# Patient Record
Sex: MALE | Race: WHITE | NOT HISPANIC OR LATINO | Employment: OTHER | URBAN - METROPOLITAN AREA
[De-identification: names, ages, dates, MRNs, and addresses within clinical notes are randomized per-mention and may not be internally consistent; named-entity substitution may affect disease eponyms.]

---

## 2017-01-25 ENCOUNTER — COMMUNICATION - HEALTHEAST (OUTPATIENT)
Dept: FAMILY MEDICINE | Facility: CLINIC | Age: 82
End: 2017-01-25

## 2017-01-25 DIAGNOSIS — E78.5 HYPERLIPIDEMIA: ICD-10-CM

## 2017-01-25 DIAGNOSIS — I25.10 CARDIOVASCULAR DISEASE: ICD-10-CM

## 2017-01-25 DIAGNOSIS — I10 ESSENTIAL HYPERTENSION: ICD-10-CM

## 2017-02-25 ENCOUNTER — RECORDS - HEALTHEAST (OUTPATIENT)
Dept: ADMINISTRATIVE | Facility: OTHER | Age: 82
End: 2017-02-25

## 2017-03-07 ENCOUNTER — OFFICE VISIT - HEALTHEAST (OUTPATIENT)
Dept: FAMILY MEDICINE | Facility: CLINIC | Age: 82
End: 2017-03-07

## 2017-03-07 DIAGNOSIS — N40.1 BENIGN PROSTATIC HYPERPLASIA WITH LOWER URINARY TRACT SYMPTOMS, UNSPECIFIED MORPHOLOGY: ICD-10-CM

## 2017-03-07 DIAGNOSIS — I10 ESSENTIAL HYPERTENSION: ICD-10-CM

## 2017-03-07 DIAGNOSIS — I25.10 CARDIOVASCULAR DISEASE: ICD-10-CM

## 2017-03-07 DIAGNOSIS — E78.5 HYPERLIPIDEMIA: ICD-10-CM

## 2017-03-07 DIAGNOSIS — M17.12 PRIMARY OSTEOARTHRITIS OF LEFT KNEE: ICD-10-CM

## 2017-03-07 LAB
CHOLEST SERPL-MCNC: 158 MG/DL
FASTING STATUS PATIENT QL REPORTED: YES
HDLC SERPL-MCNC: 32 MG/DL
LDLC SERPL CALC-MCNC: 71 MG/DL
TRIGL SERPL-MCNC: 277 MG/DL

## 2017-03-10 ENCOUNTER — COMMUNICATION - HEALTHEAST (OUTPATIENT)
Dept: FAMILY MEDICINE | Facility: CLINIC | Age: 82
End: 2017-03-10

## 2017-03-21 ENCOUNTER — COMMUNICATION - HEALTHEAST (OUTPATIENT)
Dept: SCHEDULING | Facility: CLINIC | Age: 82
End: 2017-03-21

## 2017-03-30 ENCOUNTER — OFFICE VISIT - HEALTHEAST (OUTPATIENT)
Dept: FAMILY MEDICINE | Facility: CLINIC | Age: 82
End: 2017-03-30

## 2017-03-30 DIAGNOSIS — K59.04 CHRONIC IDIOPATHIC CONSTIPATION: ICD-10-CM

## 2017-04-02 ENCOUNTER — COMMUNICATION - HEALTHEAST (OUTPATIENT)
Dept: FAMILY MEDICINE | Facility: CLINIC | Age: 82
End: 2017-04-02

## 2017-05-05 ENCOUNTER — OFFICE VISIT - HEALTHEAST (OUTPATIENT)
Dept: FAMILY MEDICINE | Facility: CLINIC | Age: 82
End: 2017-05-05

## 2017-05-05 DIAGNOSIS — M17.12 PRIMARY OSTEOARTHRITIS OF LEFT KNEE: ICD-10-CM

## 2017-05-05 DIAGNOSIS — R35.0 FREQUENCY OF URINATION: ICD-10-CM

## 2017-05-05 DIAGNOSIS — Z87.442 HISTORY OF NEPHROLITHIASIS: ICD-10-CM

## 2017-05-05 DIAGNOSIS — I10 ESSENTIAL HYPERTENSION: ICD-10-CM

## 2017-05-05 DIAGNOSIS — I25.10 CARDIOVASCULAR DISEASE: ICD-10-CM

## 2017-05-05 DIAGNOSIS — E78.5 HYPERLIPIDEMIA: ICD-10-CM

## 2017-05-05 LAB — PSA SERPL-MCNC: 1.7 NG/ML (ref 0–6.5)

## 2017-05-05 ASSESSMENT — MIFFLIN-ST. JEOR: SCORE: 1459.14

## 2017-06-06 ENCOUNTER — RECORDS - HEALTHEAST (OUTPATIENT)
Dept: ADMINISTRATIVE | Facility: OTHER | Age: 82
End: 2017-06-06

## 2017-10-06 ENCOUNTER — COMMUNICATION - HEALTHEAST (OUTPATIENT)
Dept: FAMILY MEDICINE | Facility: CLINIC | Age: 82
End: 2017-10-06

## 2017-10-06 DIAGNOSIS — M17.12 PRIMARY OSTEOARTHRITIS OF LEFT KNEE: ICD-10-CM

## 2018-04-08 ENCOUNTER — RECORDS - HEALTHEAST (OUTPATIENT)
Dept: ADMINISTRATIVE | Facility: OTHER | Age: 83
End: 2018-04-08

## 2018-05-03 ENCOUNTER — COMMUNICATION - HEALTHEAST (OUTPATIENT)
Dept: FAMILY MEDICINE | Facility: CLINIC | Age: 83
End: 2018-05-03

## 2018-05-03 DIAGNOSIS — I25.10 CARDIOVASCULAR DISEASE: ICD-10-CM

## 2018-05-03 DIAGNOSIS — I10 ESSENTIAL HYPERTENSION: ICD-10-CM

## 2018-08-03 ENCOUNTER — OFFICE VISIT - HEALTHEAST (OUTPATIENT)
Dept: FAMILY MEDICINE | Facility: CLINIC | Age: 83
End: 2018-08-03

## 2018-08-03 DIAGNOSIS — Z87.442 HISTORY OF NEPHROLITHIASIS: ICD-10-CM

## 2018-08-03 DIAGNOSIS — N40.0 BPH (BENIGN PROSTATIC HYPERPLASIA): ICD-10-CM

## 2018-08-03 DIAGNOSIS — Z00.00 ROUTINE GENERAL MEDICAL EXAMINATION AT A HEALTH CARE FACILITY: ICD-10-CM

## 2018-08-03 DIAGNOSIS — I10 ESSENTIAL HYPERTENSION: ICD-10-CM

## 2018-08-03 DIAGNOSIS — Z00.00 ROUTINE PHYSICAL EXAMINATION: ICD-10-CM

## 2018-08-03 DIAGNOSIS — I25.10 CARDIOVASCULAR DISEASE: ICD-10-CM

## 2018-08-03 LAB
ALBUMIN SERPL-MCNC: 3.8 G/DL (ref 3.5–5)
ALP SERPL-CCNC: 75 U/L (ref 45–120)
ALT SERPL W P-5'-P-CCNC: 19 U/L (ref 0–45)
ANION GAP SERPL CALCULATED.3IONS-SCNC: 9 MMOL/L (ref 5–18)
AST SERPL W P-5'-P-CCNC: 21 U/L (ref 0–40)
BILIRUB SERPL-MCNC: 0.4 MG/DL (ref 0–1)
BUN SERPL-MCNC: 22 MG/DL (ref 8–28)
CALCIUM SERPL-MCNC: 9.2 MG/DL (ref 8.5–10.5)
CHLORIDE BLD-SCNC: 105 MMOL/L (ref 98–107)
CHOLEST SERPL-MCNC: 146 MG/DL
CO2 SERPL-SCNC: 27 MMOL/L (ref 22–31)
CREAT SERPL-MCNC: 1.07 MG/DL (ref 0.7–1.3)
ERYTHROCYTE [DISTWIDTH] IN BLOOD BY AUTOMATED COUNT: 12.5 % (ref 11–14.5)
FASTING STATUS PATIENT QL REPORTED: YES
GFR SERPL CREATININE-BSD FRML MDRD: >60 ML/MIN/1.73M2
GLUCOSE BLD-MCNC: 111 MG/DL (ref 70–125)
HCT VFR BLD AUTO: 40.1 % (ref 40–54)
HDLC SERPL-MCNC: 37 MG/DL
HGB BLD-MCNC: 13.9 G/DL (ref 14–18)
LDLC SERPL CALC-MCNC: 86 MG/DL
MCH RBC QN AUTO: 33.3 PG (ref 27–34)
MCHC RBC AUTO-ENTMCNC: 34.6 G/DL (ref 32–36)
MCV RBC AUTO: 96 FL (ref 80–100)
PLATELET # BLD AUTO: 199 THOU/UL (ref 140–440)
PMV BLD AUTO: 7.6 FL (ref 7–10)
POTASSIUM BLD-SCNC: 4.8 MMOL/L (ref 3.5–5)
PROT SERPL-MCNC: 6.8 G/DL (ref 6–8)
RBC # BLD AUTO: 4.16 MILL/UL (ref 4.4–6.2)
SODIUM SERPL-SCNC: 141 MMOL/L (ref 136–145)
TRIGL SERPL-MCNC: 117 MG/DL
WBC: 8.6 THOU/UL (ref 4–11)

## 2018-08-03 ASSESSMENT — MIFFLIN-ST. JEOR: SCORE: 1419.22

## 2018-08-06 ENCOUNTER — COMMUNICATION - HEALTHEAST (OUTPATIENT)
Dept: FAMILY MEDICINE | Facility: CLINIC | Age: 83
End: 2018-08-06

## 2018-10-01 ENCOUNTER — OFFICE VISIT - HEALTHEAST (OUTPATIENT)
Dept: FAMILY MEDICINE | Facility: CLINIC | Age: 83
End: 2018-10-01

## 2018-10-01 DIAGNOSIS — K59.01 SLOW TRANSIT CONSTIPATION: ICD-10-CM

## 2019-02-11 ENCOUNTER — RECORDS - HEALTHEAST (OUTPATIENT)
Dept: ADMINISTRATIVE | Facility: OTHER | Age: 84
End: 2019-02-11

## 2019-08-15 ENCOUNTER — COMMUNICATION - HEALTHEAST (OUTPATIENT)
Dept: FAMILY MEDICINE | Facility: CLINIC | Age: 84
End: 2019-08-15

## 2019-08-15 DIAGNOSIS — I10 ESSENTIAL HYPERTENSION: ICD-10-CM

## 2019-08-15 DIAGNOSIS — I25.10 CARDIOVASCULAR DISEASE: ICD-10-CM

## 2019-08-15 DIAGNOSIS — E78.00 PURE HYPERCHOLESTEROLEMIA: ICD-10-CM

## 2019-10-02 ENCOUNTER — OFFICE VISIT - HEALTHEAST (OUTPATIENT)
Dept: FAMILY MEDICINE | Facility: CLINIC | Age: 84
End: 2019-10-02

## 2019-10-02 DIAGNOSIS — Z00.00 ROUTINE HISTORY AND PHYSICAL EXAMINATION OF ADULT: ICD-10-CM

## 2019-10-02 DIAGNOSIS — I10 ESSENTIAL HYPERTENSION: ICD-10-CM

## 2019-10-02 DIAGNOSIS — E78.00 PURE HYPERCHOLESTEROLEMIA: ICD-10-CM

## 2019-10-02 DIAGNOSIS — R35.0 BENIGN PROSTATIC HYPERPLASIA WITH URINARY FREQUENCY: ICD-10-CM

## 2019-10-02 DIAGNOSIS — I25.10 CARDIOVASCULAR DISEASE: ICD-10-CM

## 2019-10-02 DIAGNOSIS — N40.1 BENIGN PROSTATIC HYPERPLASIA WITH URINARY FREQUENCY: ICD-10-CM

## 2019-10-02 LAB
ALBUMIN SERPL-MCNC: 3.8 G/DL (ref 3.5–5)
ALP SERPL-CCNC: 68 U/L (ref 45–120)
ALT SERPL W P-5'-P-CCNC: 19 U/L (ref 0–45)
ANION GAP SERPL CALCULATED.3IONS-SCNC: 9 MMOL/L (ref 5–18)
AST SERPL W P-5'-P-CCNC: 23 U/L (ref 0–40)
BILIRUB SERPL-MCNC: 0.4 MG/DL (ref 0–1)
BUN SERPL-MCNC: 19 MG/DL (ref 8–28)
CALCIUM SERPL-MCNC: 9.4 MG/DL (ref 8.5–10.5)
CHLORIDE BLD-SCNC: 104 MMOL/L (ref 98–107)
CHOLEST SERPL-MCNC: 145 MG/DL
CO2 SERPL-SCNC: 27 MMOL/L (ref 22–31)
CREAT SERPL-MCNC: 1.02 MG/DL (ref 0.7–1.3)
ERYTHROCYTE [DISTWIDTH] IN BLOOD BY AUTOMATED COUNT: 12 % (ref 11–14.5)
FASTING STATUS PATIENT QL REPORTED: YES
GFR SERPL CREATININE-BSD FRML MDRD: >60 ML/MIN/1.73M2
GLUCOSE BLD-MCNC: 92 MG/DL (ref 70–125)
HCT VFR BLD AUTO: 41.7 % (ref 40–54)
HDLC SERPL-MCNC: 40 MG/DL
HGB BLD-MCNC: 14.1 G/DL (ref 14–18)
LDLC SERPL CALC-MCNC: 82 MG/DL
MCH RBC QN AUTO: 33.6 PG (ref 27–34)
MCHC RBC AUTO-ENTMCNC: 33.9 G/DL (ref 32–36)
MCV RBC AUTO: 99 FL (ref 80–100)
PLATELET # BLD AUTO: 195 THOU/UL (ref 140–440)
PMV BLD AUTO: 8.1 FL (ref 7–10)
POTASSIUM BLD-SCNC: 4.5 MMOL/L (ref 3.5–5)
PROT SERPL-MCNC: 6.8 G/DL (ref 6–8)
RBC # BLD AUTO: 4.2 MILL/UL (ref 4.4–6.2)
SODIUM SERPL-SCNC: 140 MMOL/L (ref 136–145)
TRIGL SERPL-MCNC: 117 MG/DL
WBC: 9.1 THOU/UL (ref 4–11)

## 2019-10-02 ASSESSMENT — MIFFLIN-ST. JEOR: SCORE: 1401.54

## 2019-10-02 ASSESSMENT — ANXIETY QUESTIONNAIRES
1. FEELING NERVOUS, ANXIOUS, OR ON EDGE: NOT AT ALL
2. NOT BEING ABLE TO STOP OR CONTROL WORRYING: NOT AT ALL

## 2019-10-08 ENCOUNTER — COMMUNICATION - HEALTHEAST (OUTPATIENT)
Dept: FAMILY MEDICINE | Facility: CLINIC | Age: 84
End: 2019-10-08

## 2020-03-05 ENCOUNTER — RECORDS - HEALTHEAST (OUTPATIENT)
Dept: ADMINISTRATIVE | Facility: OTHER | Age: 85
End: 2020-03-05

## 2020-03-11 ENCOUNTER — RECORDS - HEALTHEAST (OUTPATIENT)
Dept: ADMINISTRATIVE | Facility: OTHER | Age: 85
End: 2020-03-11

## 2020-05-11 ENCOUNTER — RECORDS - HEALTHEAST (OUTPATIENT)
Dept: ADMINISTRATIVE | Facility: OTHER | Age: 85
End: 2020-05-11

## 2020-06-29 ENCOUNTER — COMMUNICATION - HEALTHEAST (OUTPATIENT)
Dept: SCHEDULING | Facility: CLINIC | Age: 85
End: 2020-06-29

## 2020-06-29 ENCOUNTER — OFFICE VISIT - HEALTHEAST (OUTPATIENT)
Dept: FAMILY MEDICINE | Facility: CLINIC | Age: 85
End: 2020-06-29

## 2020-06-29 DIAGNOSIS — B37.0 THRUSH: ICD-10-CM

## 2020-06-29 RX ORDER — METHYLPREDNISOLONE 4 MG
2 TABLET, DOSE PACK ORAL DAILY
Status: SHIPPED | COMMUNITY
Start: 2020-06-29 | End: 2022-01-18

## 2020-06-29 RX ORDER — OXYBUTYNIN CHLORIDE 15 MG/1
15 TABLET, EXTENDED RELEASE ORAL DAILY
Status: SHIPPED | COMMUNITY
Start: 2020-04-26 | End: 2024-01-30

## 2020-06-29 ASSESSMENT — MIFFLIN-ST. JEOR: SCORE: 1404.26

## 2020-11-02 ENCOUNTER — COMMUNICATION - HEALTHEAST (OUTPATIENT)
Dept: FAMILY MEDICINE | Facility: CLINIC | Age: 85
End: 2020-11-02

## 2020-11-02 DIAGNOSIS — I25.10 CARDIOVASCULAR DISEASE: ICD-10-CM

## 2020-11-02 DIAGNOSIS — I10 ESSENTIAL HYPERTENSION: ICD-10-CM

## 2020-11-02 DIAGNOSIS — E78.00 PURE HYPERCHOLESTEROLEMIA: ICD-10-CM

## 2020-11-03 RX ORDER — CLOPIDOGREL BISULFATE 75 MG/1
75 TABLET ORAL DAILY
Qty: 90 TABLET | Refills: 3 | Status: SHIPPED | OUTPATIENT
Start: 2020-11-03 | End: 2021-10-25

## 2020-12-16 ENCOUNTER — OFFICE VISIT - HEALTHEAST (OUTPATIENT)
Dept: FAMILY MEDICINE | Facility: CLINIC | Age: 85
End: 2020-12-16

## 2020-12-16 DIAGNOSIS — C44.92 SQUAMOUS CELL CARCINOMA OF SKIN, UNSPECIFIED: ICD-10-CM

## 2020-12-16 DIAGNOSIS — K59.04 CHRONIC IDIOPATHIC CONSTIPATION: ICD-10-CM

## 2020-12-16 DIAGNOSIS — E78.2 MIXED HYPERLIPIDEMIA: ICD-10-CM

## 2020-12-16 DIAGNOSIS — Z00.00 ROUTINE HISTORY AND PHYSICAL EXAMINATION OF ADULT: ICD-10-CM

## 2020-12-16 DIAGNOSIS — I25.10 CORONARY ARTERY DISEASE INVOLVING NATIVE HEART WITHOUT ANGINA PECTORIS, UNSPECIFIED VESSEL OR LESION TYPE: ICD-10-CM

## 2020-12-16 DIAGNOSIS — R09.89 RHONCHI: ICD-10-CM

## 2020-12-16 DIAGNOSIS — M17.12 PRIMARY OSTEOARTHRITIS OF LEFT KNEE: ICD-10-CM

## 2020-12-16 DIAGNOSIS — R06.9 UNSPECIFIED ABNORMALITIES OF BREATHING: ICD-10-CM

## 2020-12-16 LAB
ALBUMIN SERPL-MCNC: 3.8 G/DL (ref 3.5–5)
ALP SERPL-CCNC: 68 U/L (ref 45–120)
ALT SERPL W P-5'-P-CCNC: 16 U/L (ref 0–45)
ANION GAP SERPL CALCULATED.3IONS-SCNC: 9 MMOL/L (ref 5–18)
AST SERPL W P-5'-P-CCNC: 21 U/L (ref 0–40)
BILIRUB SERPL-MCNC: 0.5 MG/DL (ref 0–1)
BNP SERPL-MCNC: 218 PG/ML (ref 0–93)
BUN SERPL-MCNC: 21 MG/DL (ref 8–28)
CALCIUM SERPL-MCNC: 8.9 MG/DL (ref 8.5–10.5)
CHLORIDE BLD-SCNC: 105 MMOL/L (ref 98–107)
CHOLEST SERPL-MCNC: 127 MG/DL
CO2 SERPL-SCNC: 27 MMOL/L (ref 22–31)
CREAT SERPL-MCNC: 0.88 MG/DL (ref 0.7–1.3)
ERYTHROCYTE [DISTWIDTH] IN BLOOD BY AUTOMATED COUNT: 11.8 % (ref 11–14.5)
FASTING STATUS PATIENT QL REPORTED: YES
GFR SERPL CREATININE-BSD FRML MDRD: >60 ML/MIN/1.73M2
GLUCOSE BLD-MCNC: 104 MG/DL (ref 70–125)
HCT VFR BLD AUTO: 38.3 % (ref 40–54)
HDLC SERPL-MCNC: 39 MG/DL
HGB BLD-MCNC: 13.2 G/DL (ref 14–18)
LDLC SERPL CALC-MCNC: 67 MG/DL
MCH RBC QN AUTO: 34.4 PG (ref 27–34)
MCHC RBC AUTO-ENTMCNC: 34.5 G/DL (ref 32–36)
MCV RBC AUTO: 100 FL (ref 80–100)
PLATELET # BLD AUTO: 176 THOU/UL (ref 140–440)
PMV BLD AUTO: 7.9 FL (ref 7–10)
POTASSIUM BLD-SCNC: 4.7 MMOL/L (ref 3.5–5)
PROT SERPL-MCNC: 6.5 G/DL (ref 6–8)
RBC # BLD AUTO: 3.84 MILL/UL (ref 4.4–6.2)
SODIUM SERPL-SCNC: 141 MMOL/L (ref 136–145)
TRIGL SERPL-MCNC: 103 MG/DL
WBC: 8.6 THOU/UL (ref 4–11)

## 2020-12-16 RX ORDER — TAMSULOSIN HYDROCHLORIDE 0.4 MG/1
0.8 CAPSULE ORAL
Qty: 180 CAPSULE | Refills: 3 | Status: SHIPPED | OUTPATIENT
Start: 2020-12-16 | End: 2021-12-05

## 2020-12-16 ASSESSMENT — MIFFLIN-ST. JEOR: SCORE: 1376.36

## 2020-12-16 ASSESSMENT — ANXIETY QUESTIONNAIRES
2. NOT BEING ABLE TO STOP OR CONTROL WORRYING: NOT AT ALL
1. FEELING NERVOUS, ANXIOUS, OR ON EDGE: NOT AT ALL

## 2020-12-17 ENCOUNTER — COMMUNICATION - HEALTHEAST (OUTPATIENT)
Dept: FAMILY MEDICINE | Facility: CLINIC | Age: 85
End: 2020-12-17

## 2021-01-07 ENCOUNTER — COMMUNICATION - HEALTHEAST (OUTPATIENT)
Dept: FAMILY MEDICINE | Facility: CLINIC | Age: 86
End: 2021-01-07

## 2021-02-04 ENCOUNTER — AMBULATORY - HEALTHEAST (OUTPATIENT)
Dept: NURSING | Facility: CLINIC | Age: 86
End: 2021-02-04

## 2021-02-25 ENCOUNTER — AMBULATORY - HEALTHEAST (OUTPATIENT)
Dept: NURSING | Facility: CLINIC | Age: 86
End: 2021-02-25

## 2021-04-13 ENCOUNTER — RECORDS - HEALTHEAST (OUTPATIENT)
Dept: ADMINISTRATIVE | Facility: OTHER | Age: 86
End: 2021-04-13

## 2021-04-26 ENCOUNTER — COMMUNICATION - HEALTHEAST (OUTPATIENT)
Dept: FAMILY MEDICINE | Facility: CLINIC | Age: 86
End: 2021-04-26

## 2021-04-26 DIAGNOSIS — E78.00 PURE HYPERCHOLESTEROLEMIA: ICD-10-CM

## 2021-04-26 DIAGNOSIS — I10 ESSENTIAL HYPERTENSION: ICD-10-CM

## 2021-04-26 RX ORDER — ATENOLOL 25 MG/1
25 TABLET ORAL DAILY
Qty: 90 TABLET | Refills: 2 | Status: SHIPPED | OUTPATIENT
Start: 2021-04-26 | End: 2022-01-23

## 2021-04-26 RX ORDER — SIMVASTATIN 20 MG
TABLET ORAL
Qty: 90 TABLET | Refills: 2 | Status: SHIPPED | OUTPATIENT
Start: 2021-04-26 | End: 2022-01-23

## 2021-05-24 ENCOUNTER — RECORDS - HEALTHEAST (OUTPATIENT)
Dept: ADMINISTRATIVE | Facility: CLINIC | Age: 86
End: 2021-05-24

## 2021-05-25 ENCOUNTER — RECORDS - HEALTHEAST (OUTPATIENT)
Dept: ADMINISTRATIVE | Facility: OTHER | Age: 86
End: 2021-05-25

## 2021-05-29 ENCOUNTER — RECORDS - HEALTHEAST (OUTPATIENT)
Dept: ADMINISTRATIVE | Facility: CLINIC | Age: 86
End: 2021-05-29

## 2021-05-30 ENCOUNTER — RECORDS - HEALTHEAST (OUTPATIENT)
Dept: ADMINISTRATIVE | Facility: CLINIC | Age: 86
End: 2021-05-30

## 2021-05-30 VITALS — BODY MASS INDEX: 30.95 KG/M2 | WEIGHT: 186 LBS

## 2021-05-30 VITALS — BODY MASS INDEX: 30.34 KG/M2 | HEIGHT: 66 IN | WEIGHT: 188.8 LBS

## 2021-05-30 VITALS — WEIGHT: 187.38 LBS | BODY MASS INDEX: 31.18 KG/M2

## 2021-05-31 ENCOUNTER — RECORDS - HEALTHEAST (OUTPATIENT)
Dept: ADMINISTRATIVE | Facility: CLINIC | Age: 86
End: 2021-05-31

## 2021-05-31 NOTE — TELEPHONE ENCOUNTER
Refill Approved    Rx renewed per Medication Renewal Policy. Medication was last renewed on 8/3/18.    Roselia Stanley, [facility] Triage/Med Refill 8/15/2019     Requested Prescriptions   Pending Prescriptions Disp Refills     simvastatin (ZOCOR) 20 MG tablet [Pharmacy Med Name: SIMVASTATIN 20MG TABLETS] 90 tablet 0     Sig: TAKE 1 TABLET BY MOUTH AT BEDTIME       Statins Refill Protocol (Hmg CoA Reductase Inhibitors) Passed - 8/15/2019  5:05 AM        Passed - PCP or prescribing provider visit in past 12 months      Last office visit with prescriber/PCP: 3/7/2017 Miguel Day MD OR same dept: 10/1/2018 Brooke Manning DO OR same specialty: 10/1/2018 Brooke Manning DO  Last physical: 8/3/2018 Last MTM visit: Visit date not found   Next visit within 3 mo: Visit date not found  Next physical within 3 mo: Visit date not found  Prescriber OR PCP: Miguel Day MD  Last diagnosis associated with med order: 1. Cardiovascular disease  - clopidogrel (PLAVIX) 75 mg tablet [Pharmacy Med Name: CLOPIDOGREL 75MG TABLETS]; TAKE 1 TABLET BY MOUTH DAILY  Dispense: 90 tablet; Refill: 0    If protocol passes may refill for 12 months if within 3 months of last provider visit (or a total of 15 months).             atenolol (TENORMIN) 25 MG tablet [Pharmacy Med Name: ATENOLOL 25MG TABLETS] 90 tablet 0     Sig: TAKE 1 TABLET BY MOUTH DAILY       Beta-Blockers Refill Protocol Passed - 8/15/2019  5:05 AM        Passed - PCP or prescribing provider visit in past 12 months or next 3 months     Last office visit with prescriber/PCP: 3/7/2017 Miguel Day MD OR same dept: 10/1/2018 Brooke Manning DO OR same specialty: 10/1/2018 Brooke Manning DO  Last physical: 8/3/2018 Last MTM visit: Visit date not found   Next visit within 3 mo: Visit date not found  Next physical within 3 mo: Visit date not found  Prescriber OR PCP: Miguel Day MD  Last diagnosis associated with med order: 1.  Cardiovascular disease  - clopidogrel (PLAVIX) 75 mg tablet [Pharmacy Med Name: CLOPIDOGREL 75MG TABLETS]; TAKE 1 TABLET BY MOUTH DAILY  Dispense: 90 tablet; Refill: 0    If protocol passes may refill for 12 months if within 3 months of last provider visit (or a total of 15 months).             Passed - Blood pressure filed in past 12 months     BP Readings from Last 1 Encounters:   10/01/18 119/55             clopidogrel (PLAVIX) 75 mg tablet [Pharmacy Med Name: CLOPIDOGREL 75MG TABLETS] 90 tablet 0     Sig: TAKE 1 TABLET BY MOUTH DAILY       Clopidogrel/Prasugrel/Ticagrelor Refill Protocol Failed - 8/15/2019  5:05 AM        Failed - PCP or prescribing provider visit in past 6 months       Last office visit with prescriber/PCP: Visit date not found OR same dept: 10/1/2018 Brooke Manning DO OR same specialty: 10/1/2018 Brooke Manning DO Last physical: Visit date not found Last MTM visit: Visit date not found     Next appt within 3 mo: Visit date not found  Next physical within 3 mo: Visit date not found  Prescriber OR PCP: Miguel Day MD  Last diagnosis associated with med order: 1. Cardiovascular disease  - clopidogrel (PLAVIX) 75 mg tablet [Pharmacy Med Name: CLOPIDOGREL 75MG TABLETS]; TAKE 1 TABLET BY MOUTH DAILY  Dispense: 90 tablet; Refill: 0    If protocol passes may refill for 6 months if within 3 months of last provider visit (or a total of 9 months).              Failed - Hemoglobin in past 12 months     Hemoglobin   Date Value Ref Range Status   08/03/2018 13.9 (L) 14.0 - 18.0 g/dL Final

## 2021-05-31 NOTE — TELEPHONE ENCOUNTER
RN cannot approve Refill Request    RN can NOT refill this medication Protocol failed and NO refill given.     Roselia Stanley, [facility] Triage/Med Refill 8/15/2019    Requested Prescriptions   Pending Prescriptions Disp Refills     clopidogrel (PLAVIX) 75 mg tablet [Pharmacy Med Name: CLOPIDOGREL 75MG TABLETS] 90 tablet 3     Sig: TAKE 1 TABLET BY MOUTH DAILY       Clopidogrel/Prasugrel/Ticagrelor Refill Protocol Failed - 8/15/2019  5:05 AM        Failed - PCP or prescribing provider visit in past 6 months       Last office visit with prescriber/PCP: Visit date not found OR same dept: 10/1/2018 Brooke Manning DO OR same specialty: 10/1/2018 Brooke Manning DO Last physical: Visit date not found Last MTM visit: Visit date not found     Next appt within 3 mo: Visit date not found  Next physical within 3 mo: Visit date not found  Prescriber OR PCP: Miguel Day MD  Last diagnosis associated with med order: 1. Cardiovascular disease  - clopidogrel (PLAVIX) 75 mg tablet [Pharmacy Med Name: CLOPIDOGREL 75MG TABLETS]; TAKE 1 TABLET BY MOUTH DAILY  Dispense: 90 tablet; Refill: 0    2. Essential Hypercholesterolemia  - simvastatin (ZOCOR) 20 MG tablet; TAKE 1 TABLET BY MOUTH AT BEDTIME  Dispense: 90 tablet; Refill: 0    3. Essential hypertension  - atenolol (TENORMIN) 25 MG tablet; TAKE 1 TABLET BY MOUTH DAILY  Dispense: 90 tablet; Refill: 0    If protocol passes may refill for 6 months if within 3 months of last provider visit (or a total of 9 months).              Failed - Hemoglobin in past 12 months     Hemoglobin   Date Value Ref Range Status   08/03/2018 13.9 (L) 14.0 - 18.0 g/dL Final           Signed Prescriptions Disp Refills    simvastatin (ZOCOR) 20 MG tablet 90 tablet 0     Sig: TAKE 1 TABLET BY MOUTH AT BEDTIME       Statins Refill Protocol (Hmg CoA Reductase Inhibitors) Passed - 8/15/2019  5:05 AM        Passed - PCP or prescribing provider visit in past 12 months      Last office  visit with prescriber/PCP: 3/7/2017 Miguel Day MD OR same dept: 10/1/2018 Brooke Manning DO OR same specialty: 10/1/2018 Brooke Manning DO  Last physical: 8/3/2018 Last MTM visit: Visit date not found   Next visit within 3 mo: Visit date not found  Next physical within 3 mo: Visit date not found  Prescriber OR PCP: Miguel Day MD  Last diagnosis associated with med order: 1. Cardiovascular disease  - clopidogrel (PLAVIX) 75 mg tablet [Pharmacy Med Name: CLOPIDOGREL 75MG TABLETS]; TAKE 1 TABLET BY MOUTH DAILY  Dispense: 90 tablet; Refill: 0    2. Essential Hypercholesterolemia  - simvastatin (ZOCOR) 20 MG tablet; TAKE 1 TABLET BY MOUTH AT BEDTIME  Dispense: 90 tablet; Refill: 0    3. Essential hypertension  - atenolol (TENORMIN) 25 MG tablet; TAKE 1 TABLET BY MOUTH DAILY  Dispense: 90 tablet; Refill: 0    If protocol passes may refill for 12 months if within 3 months of last provider visit (or a total of 15 months).            atenolol (TENORMIN) 25 MG tablet 90 tablet 0     Sig: TAKE 1 TABLET BY MOUTH DAILY       Beta-Blockers Refill Protocol Passed - 8/15/2019  5:05 AM        Passed - PCP or prescribing provider visit in past 12 months or next 3 months     Last office visit with prescriber/PCP: 3/7/2017 Miguel Day MD OR same dept: 10/1/2018 Brooke Manning DO OR same specialty: 10/1/2018 Brooke Manning DO  Last physical: 8/3/2018 Last MTM visit: Visit date not found   Next visit within 3 mo: Visit date not found  Next physical within 3 mo: Visit date not found  Prescriber OR PCP: Miguel Day MD  Last diagnosis associated with med order: 1. Cardiovascular disease  - clopidogrel (PLAVIX) 75 mg tablet [Pharmacy Med Name: CLOPIDOGREL 75MG TABLETS]; TAKE 1 TABLET BY MOUTH DAILY  Dispense: 90 tablet; Refill: 0    2. Essential Hypercholesterolemia  - simvastatin (ZOCOR) 20 MG tablet; TAKE 1 TABLET BY MOUTH AT BEDTIME  Dispense: 90 tablet; Refill: 0    3.  Essential hypertension  - atenolol (TENORMIN) 25 MG tablet; TAKE 1 TABLET BY MOUTH DAILY  Dispense: 90 tablet; Refill: 0    If protocol passes may refill for 12 months if within 3 months of last provider visit (or a total of 15 months).             Passed - Blood pressure filed in past 12 months     BP Readings from Last 1 Encounters:   10/01/18 119/55

## 2021-06-01 VITALS — BODY MASS INDEX: 28.67 KG/M2 | WEIGHT: 177.6 LBS

## 2021-06-01 VITALS — HEIGHT: 66 IN | WEIGHT: 180 LBS | BODY MASS INDEX: 28.93 KG/M2

## 2021-06-01 NOTE — PROGRESS NOTES
Assessment and Plan:       1. Routine history and physical examination of adult  Patient is here for annual exam  Patient has no acute concerns  Patient requesting refills of his medication sent to the pharmacy  - Comprehensive Metabolic Panel  - HM2(CBC w/o Differential)  - Lipid Cascade    2. Essential hypertension  Patient's blood pressure is at goal range continue with current medication  - atenolol (TENORMIN) 25 MG tablet; Take 1 tablet (25 mg total) by mouth daily.  Dispense: 90 tablet; Refill: 3    3. Cardiovascular disease  With patient's history of coronary artery disease he has been recommend that he continue on Plavix  - clopidogrel (PLAVIX) 75 mg tablet; Take 1 tablet (75 mg total) by mouth daily.  Dispense: 90 tablet; Refill: 3    4. Essential Hypercholesterolemia  Patient is on statin therapy for history of coronary artery disease goal LDL is 70  Check cholesterol levels today and follow-up based on results  - simvastatin (ZOCOR) 20 MG tablet; Take 1 tablet (20 mg total) by mouth at bedtime.  Dispense: 90 tablet; Refill: 3    5. Benign prostatic hyperplasia with urinary frequency  Patient follows with urology  Is on tamsulosin as well as oxybutynin    The patient's current medical problems were reviewed.    The following high BMI interventions were performed this visit: encouragement to exercise  The following health maintenance schedule was reviewed with the patient and provided in printed form in the after visit summary:   Health Maintenance   Topic Date Due     TD 18+ HE  05/23/1949     ZOSTER VACCINES (1 of 2) 05/23/1981     MEDICARE ANNUAL WELLNESS VISIT  08/03/2019     FALL RISK ASSESSMENT  08/03/2019     PNEUMOCOCCAL IMMUNIZATION 65+ LOW/MEDIUM RISK (2 of 2 - PPSV23) 08/03/2019     ADVANCE CARE PLANNING  08/03/2023     INFLUENZA VACCINE RULE BASED  Completed        Subjective:   Chief Complaint: Balwinder Huang is an 88 y.o. male here for an Annual Wellness visit.   HPI: Patient is here for annual  exam  Patient follows with urology for BPH  He follows with dermatology for his history of skin cancer  Patient's blood pressure is at goal range we will have him continue with atenolol  For his history of coronary artery disease patient continues on Plavix and a statin-we will check cholesterol levels today  He is up-to-date on immunizations  Patient is active both socially and mentally  Patient does a lot of reading  He enjoys stamp collecting    Review of Systems:   Please see above.  The rest of the review of systems are negative for all systems.    Patient Care Team:  Miguel Day MD as PCP - Brooke Leach DO as Assigned PCP     Patient Active Problem List   Diagnosis     Essential Hypercholesterolemia     Joint Stiffness Of The Hip     Essential hypertension     Coronary Artery Disease     Fainting (Syncope)     Squamous cell carcinoma of skin, unspecified     Primary osteoarthritis of left knee     Hyperlipidemia     Coronary artery disease involving native coronary artery without angina pectoris     Chronic idiopathic constipation     History of nephrolithiasis     Past Medical History:   Diagnosis Date     History of transfusion      Hyperlipemia      Hypertension       Past Surgical History:   Procedure Laterality Date     APPENDECTOMY       JOINT REPLACEMENT Right     hip     CO CABG, VEIN, SINGLE      Description: CABG (CABG);  Recorded: 08/04/2014;     CO INSERT INTRACORONARY STENT      Description: Cath Stent Placement;  Recorded: 08/04/2014;     TOTAL HIP ARTHROPLASTY Right      total knee arthroplasty Left 06/2016    [facility]      Family History   Problem Relation Age of Onset     Prostate cancer Brother       Social History     Socioeconomic History     Marital status:      Spouse name: Not on file     Number of children: Not on file     Years of education: Not on file     Highest education level: Not on file   Occupational History     Not on file   Social Needs      Financial resource strain: Not on file     Food insecurity:     Worry: Not on file     Inability: Not on file     Transportation needs:     Medical: Not on file     Non-medical: Not on file   Tobacco Use     Smoking status: Never Smoker     Smokeless tobacco: Never Used   Substance and Sexual Activity     Alcohol use: No     Drug use: No     Sexual activity: Not on file   Lifestyle     Physical activity:     Days per week: Not on file     Minutes per session: Not on file     Stress: Not on file   Relationships     Social connections:     Talks on phone: Not on file     Gets together: Not on file     Attends Anabaptism service: Not on file     Active member of club or organization: Not on file     Attends meetings of clubs or organizations: Not on file     Relationship status: Not on file     Intimate partner violence:     Fear of current or ex partner: Not on file     Emotionally abused: Not on file     Physically abused: Not on file     Forced sexual activity: Not on file   Other Topics Concern     Not on file   Social History Narrative     Not on file      Current Outpatient Medications   Medication Sig Dispense Refill     aspirin 325 MG tablet Take 1 tablet (325 mg total) by mouth daily. Must take for 30 days for dvt prophylaxis. Take with meals  0     atenolol (TENORMIN) 25 MG tablet Take 1 tablet (25 mg total) by mouth daily. 90 tablet 3     clopidogrel (PLAVIX) 75 mg tablet Take 1 tablet (75 mg total) by mouth daily. 90 tablet 3     multivitamin therapeutic (THERAGRAN) tablet Take 1 tablet by mouth daily.       oxybutynin (DITROPAN XL) 10 MG ER tablet Take 10 mg by mouth daily.       simvastatin (ZOCOR) 20 MG tablet Take 1 tablet (20 mg total) by mouth at bedtime. 90 tablet 3     tamsulosin (FLOMAX) 0.4 mg Cp24 Take 2 capsules (0.8 mg total) by mouth Daily after breakfast. 180 capsule 3     vit C/vit E/lutein/min/omega-3 (OCUVITE ORAL) Take 1 tablet by mouth daily.       No current facility-administered  "medications for this visit.       Objective:   Vital Signs:   Visit Vitals  /49 (Patient Site: Left Arm, Patient Position: Sitting, Cuff Size: Adult Regular)   Pulse 68   Temp 98.4  F (36.9  C) (Oral)   Resp 18   Ht 5' 5\" (1.651 m)   Wt 179 lb 9.6 oz (81.5 kg)   BMI 29.89 kg/m         VisionScreening:  No exam data present     PHYSICAL EXAM  Physical Examination: General appearance - alert, well appearing, and in no distress, walks with aid of cane or walker  Mental status - alert, oriented to person, place, and time  Eyes - pupils equal and reactive, extraocular eye movements intact  Ears - bilateral TM's and external ear canals normal  Chest - clear to auscultation, no wheezes, rales or rhonchi, symmetric air entry  Heart - normal rate, regular rhythm, normal S1, S2, no murmurs, rubs, clicks or gallops  Abdomen - soft, nontender, nondistended, no masses or organomegaly  Neurological - alert, oriented, normal speech, no focal findings or movement disorder noted  Musculoskeletal - no joint tenderness, deformity or swelling  Extremities - peripheral pulses normal, no pedal edema, no clubbing or cyanosis      Assessment Results 10/2/2019   Activities of Daily Living No help needed   Instrumental Activities of Daily Living No help needed   Get Up and Go Score -   Mini Cog Total Score 4   Some recent data might be hidden     A Mini-Cog score of 0-2 suggests the possibility of dementia, score of 3-5 suggests no dementia    Identified Health Risks:     The patient was provided with suggestions to help him develop a healthy physical lifestyle.   He is at risk for lack of exercise and has been provided with information to increase physical activity for the benefit of his well-being.  The patient was counseled and encouraged to consider modifying their diet and eating habits. He was provided with information on recommended healthy diet options.  The patient reports that he does not have all recommended working " emergency equipment available. He was provided with information about emergency preparedness, including smoke detectors.  He is at risk for falling and has been provided with information to reduce the risk of falling at home.  Patient's advanced directive was discussed and I am comfortable with the patient's wishes.         5

## 2021-06-03 VITALS
BODY MASS INDEX: 29.92 KG/M2 | TEMPERATURE: 98.4 F | HEART RATE: 68 BPM | SYSTOLIC BLOOD PRESSURE: 110 MMHG | HEIGHT: 65 IN | WEIGHT: 179.6 LBS | DIASTOLIC BLOOD PRESSURE: 49 MMHG | RESPIRATION RATE: 18 BRPM

## 2021-06-04 VITALS
HEART RATE: 74 BPM | RESPIRATION RATE: 16 BRPM | TEMPERATURE: 97.2 F | SYSTOLIC BLOOD PRESSURE: 112 MMHG | BODY MASS INDEX: 30.02 KG/M2 | HEIGHT: 65 IN | WEIGHT: 180.2 LBS | DIASTOLIC BLOOD PRESSURE: 44 MMHG

## 2021-06-05 VITALS
SYSTOLIC BLOOD PRESSURE: 135 MMHG | RESPIRATION RATE: 18 BRPM | BODY MASS INDEX: 29.29 KG/M2 | HEART RATE: 68 BPM | DIASTOLIC BLOOD PRESSURE: 65 MMHG | HEIGHT: 65 IN | TEMPERATURE: 97.2 F | WEIGHT: 175.8 LBS

## 2021-06-09 NOTE — TELEPHONE ENCOUNTER
Same Date/Next Day Appt Request  What is the reason for your visit?: Per patient he as a lot of sores in his mouth and wants to be seen for it ASAP    Provider Preference: ANY PCP    How soon do you need to be seen?: today    Waitlist offered?: No    Okay to leave a detailed message:  No

## 2021-06-09 NOTE — PROGRESS NOTES
Please call patient:    Thyroid testing is normal. I hope your constipation is better. Let me know what else I can do for you    Dr. Christianson.

## 2021-06-09 NOTE — PROGRESS NOTES
Assessment/Plan:     Problem List Items Addressed This Visit     None      Visit Diagnoses     Thrush    -  Primary    We will treat with nystatin as per orders.  Side effects precautions discussed.  If not improved in 5 days contact clinic and will move over to either oral trojes or 7 days of ketoconazole 200 mg daily.    Relevant Medications    nystatin (MYCOSTATIN) 100,000 unit/mL suspension        Return in about 3 months (around 10/2/2020) for Annual physical.    Subjective:   89 y.o. male presents for sore mouth for the past 2 weeks.  He has tried mouthwash without help.  Mainly in the right side of his mouth.  No no change in dentures.  No recent dental use.  No inhalers.  Has been having feeling of a dry mouth but that is pretty consistent with his use of oxybutynin.  No change in urination.  No fevers or chills.  No loose teeth or pain in the teeth.      Mouth Lesions    Associated symptoms include mouth sores. Pertinent negatives include no fever, no double vision, no eye itching, no abdominal pain, no diarrhea, no nausea, no congestion, no ear discharge, no ear pain, no headaches, no rhinorrhea, no sore throat, no swollen glands, no neck pain, no neck stiffness, no cough, no URI, no wheezing, no rash, no eye discharge, no eye pain and no eye redness. He has been crying more.       Review of Systems   Constitutional: Negative for fever.   HENT: Positive for mouth sores. Negative for congestion, ear discharge, ear pain, rhinorrhea and sore throat.    Eyes: Negative for double vision, pain, discharge, redness and itching.   Respiratory: Negative for cough and wheezing.    Gastrointestinal: Negative for abdominal pain, diarrhea and nausea.   Musculoskeletal: Negative for neck pain.   Skin: Negative for rash.   Neurological: Negative for headaches.        History     Reviewed By Date/Time Sections Reviewed    Kareem Diop DO 6/29/2020  1:57 PM Medical, Surgical, Tobacco, Family, Socioeconomic     "Gonsalo Paulino TYRA Rebollar, [facility] 6/29/2020  1:51 PM Tobacco           Objective:     Vitals:    06/29/20 1348   BP: 112/44   Pulse: 74   Resp: 16   Temp: 97.2  F (36.2  C)   TempSrc: Oral   Weight: 180 lb 3.2 oz (81.7 kg)   Height: 5' 5\" (1.651 m)     Physical Exam  Vitals signs reviewed.   Constitutional:       General: He is not in acute distress.     Appearance: Normal appearance.   HENT:      Head: Normocephalic and atraumatic.      Nose: Nose normal. No congestion.      Mouth/Throat:      Mouth: Mucous membranes are moist.      Comments: White adherence to the right side vehicle surface and peripheral mouth as well as right side of tongue.  No open ulcerations.  Musculoskeletal:      Right lower leg: No edema.      Left lower leg: No edema.   Lymphadenopathy:      Cervical: No cervical adenopathy.   Skin:     Capillary Refill: Capillary refill takes less than 2 seconds.      Findings: No rash.   Neurological:      Mental Status: He is alert and oriented to person, place, and time.   Psychiatric:         Mood and Affect: Mood normal.         Thought Content: Thought content normal.         This note has been dictated using voice recognition software. Any grammatical or context distortions are unintentional and inherent to the software      "

## 2021-06-09 NOTE — PROGRESS NOTES
SUBJECTIVE   Balwinder Huang is a 85 y.o. old male with a past medical history of HTN, HLD, CAD, and BPH who presented to clinic today for further evaluation of worsening constipation. He reports his constipation worsened since starting tamsulosin on 3/7/2017 for BPH. He has had constipation in the past. Tamsulosin has helped his urinary issues. This episode of constipation has been going on for 2-3 weeks. Has been using many different OTC treatments (including Dulcolax, exlax or Sennosides, suppository and fleet enema). Has done 3 enemas and 2-3 suppositories in the last week. Denies blood in stool. He has been straining and cannot have any bowel without enemas or suppositories. He denies any abdominal pain but does feel fullness. He hasn't taken Miralax. He does take Metamucil capsules (2 capsules a day). He denies any fever/chills. He denies any urinary symptoms. He tries to eat enough fruits/vegetables. Previous colonoscopies have been normal, did have polyp but nothing else.        Medical History  Active Ambulatory (Non-Hospital) Problems    Diagnosis     Primary osteoarthritis of left knee     Hyperlipidemia     Coronary artery disease involving native coronary artery without angina pectoris     Squamous cell carcinoma     Essential hypertension     Coronary Artery Disease     Fainting (Syncope)     Essential Hypercholesterolemia     Joint Stiffness Of The Hip     Past Medical History:   Diagnosis Date     History of transfusion      Hyperlipemia      Hypertension        Surgical History  He  has a past surgical history that includes cabg, vein, single; insert intracoronary stent; Joint replacement (Right); and total knee arthroplasty (Left, 06/2016).    Social History  Reviewed, and he  reports that he has never smoked. He has never used smokeless tobacco. He reports that he does not drink alcohol or use illicit drugs.    Family History  Reviewed, and family history is not on file.    Medications  Reviewed  and reconciled      Allergies  Allergies   Allergen Reactions     Shellfish Containing Products      Shrimp     Ampicillin Rash     On face         OBJECTIVE  Physical Exam:  Vital signs: /58 (Patient Site: Left Arm, Patient Position: Sitting, Cuff Size: Adult Large)  Pulse 80  Temp 98.1  F (36.7  C) (Oral)   Resp 22  Wt 187 lb 6 oz (85 kg)  BMI 31.18 kg/m2  Weight: 187 lb 6 oz (85 kg)    General appearance: pleasant, appears stated age, cooperative and in no distress  Lymph: no cervical/supraclavicular adenopathy  Respiratory: clear to auscultation bilaterally, good air movement throughout, no wheezing or crackles, speaking full sentences without difficulty  Cardiovascular: regular rate and rhythm, no murmur appreciated, no leg edema  Abdomen: active bowel sounds, soft, non-distended, mildly tender to palpation in the lower abdominal region  : rectal exam performed, stool in rectal vault, no blood visualized. No lump/mass appreciated.      ASSESSMENT/ PLAN    1. Chronic idiopathic constipation  85 year old male with h/o HTN, HLD, CAD, and BPH here today for worsening constipation. Patient noticed this after he was started on tamsulosin for BPH symptoms. He has a long standing h/o constipation. Constipation isn't a side effect of tamsulosin. Patient noticed tremendous benefit from tamsulosin. Continue with tamsulosin. Will treat him symptomatically. Mag citrate/glygerin suppository/fleet enema as needed. Advised patient to take miralax, pericolace, and metamucil fiber supplement daily. Will also check TSH to make sure constipation isn't caused by thyroid issue. No imaging warranted at this point. RTC in 1-2 weeks if not improving. Patient verbalized understanding and agreed with plan.   - magnesium citrate solution; Take 296 mL by mouth once for 1 dose.  Dispense: 300 mL; Refill: 2  - senna-docusate (PERICOLACE) 8.6-50 mg tablet; Take 2 tablets by mouth daily.  Dispense: 30 tablet; Refill: 1  - sodium  phosphates 133 mL (SODIUM PHOSPHATE) 19-7 gram/118 mL Enem rectal enema; Insert 1 enema into the rectum once for 1 dose.  Dispense: 1 mL; Refill: 3  - glycerin, adult, Supp rectal suppository; Insert 1 suppository into the rectum daily as needed.  Dispense: 45 each; Refill: 1  - Thyroid Stimulating Hormone (TSH)      Darling Christianson MD

## 2021-06-09 NOTE — PROGRESS NOTES
ASSESSMENT & PLAN:    1. Essential hypertension  Blood pressure at goal continue current medication at this time  - atenolol (TENORMIN) 50 MG tablet; Take 1 tablet (50 mg total) by mouth daily.  Dispense: 90 tablet; Refill: 3  - HM2(CBC w/o Differential)    2. Cardiovascular disease  Refill of Plavix given today that currently benefit outweighs risks of medication continue with Plavix at this time  - clopidogrel (PLAVIX) 75 mg tablet; Take 1 tablet (75 mg total) by mouth daily.  Dispense: 90 tablet; Refill: 3    3. Hyperlipidemia  Check cholesterol levels today and follow-up based on results  Refill sent to the pharmacy  - simvastatin (ZOCOR) 20 MG tablet; Take 1 tablet (20 mg total) by mouth bedtime.  Dispense: 90 tablet; Refill: 3  - Lipid Cascade  - Comprehensive Metabolic Panel  - HM2(CBC w/o Differential)    4. Primary osteoarthritis of left knee  Patient continues to do well with movement and exercise  No changes in medications at this time      5. Benign prostatic hyperplasia with lower urinary tract symptoms, unspecified morphology  Symptoms consistent with BPH  We will start tamsulosin once daily over the next month to see if symptoms can be improved  Patient will contact me if symptoms not improving      There are no Patient Instructions on file for this visit.    Orders Placed This Encounter   Procedures     Lipid Cascade     Order Specific Question:   Fasting is required?     Answer:   Yes     Comprehensive Metabolic Panel     HM2(CBC w/o Differential)     Medications Discontinued During This Encounter   Medication Reason     senna-docusate (PERICOLACE) 8.6-50 mg tablet Therapy completed     GLUC XIONG/CHONDRO XIONG A/VIT C/MN (GLUCOSAMINE 1500 COMPLEX ORAL) Therapy completed     simvastatin (ZOCOR) 20 MG tablet Duplicate order     atenolol (TENORMIN) 50 MG tablet Reorder     clopidogrel (PLAVIX) 75 mg tablet Reorder     simvastatin (ZOCOR) 20 MG tablet Reorder     tamsulosin (FLOMAX) 0.4 mg Cp24 Reorder        No Follow-up on file.    CHIEF COMPLAINT:  Chief Complaint   Patient presents with     Hyperlipidemia     Medication check - fasting labs      Urinary Frequency     Discuss medication options, having to get up about 4 times during the night        HISTORY OF PRESENT ILLNESS:  Balwinder is a 85 y.o. male presenting to the clinic today for a medication check and with complaints of urinary frequency.     Urinary Frequency: He has had some trouble with urinary frequency for a while, but it has worsened in the past few months. Lately, he has been getting up three or four times per night and it is becoming quite bothersome. He also goes frequently during the day. He does not have much hesitancy starting the stream, but he is not able to fully empty his bladder. He is able to get a decent amount of urine out the first time he gets up at night, but there is significantly less the subsequent times. He does not think he has been taking tamsulosin even though it is on his medication list. It was mentioned that a side effect of the medication is back pain and he notes already having back pain due to disc problem. He admits that he has been drinking less water as a result of not wanting to urinate as frequently.     Coronary Artery Disease: He takes Plavix for his history of coronary artery disease. He has had a CABG and three stents placed. He was told that he will probably take Plavix for the rest of his life.     Hyperlipidemia: He continues to take 20 mg of simvastatin daily. Check lipids today  Patient had some problems with arthritis in his knees but is doing well and try to maintain activity  REVIEW OF SYSTEMS:   He is fasting today. He has had some hard stools and inquires what could be done to help that. He takes Metamucil daily but admits that he does not drink much water. All other systems are negative.    PFSH:  He does not do a lot for exercise, but he tries to keep moving.     TOBACCO USE:  History   Smoking  Status     Never Smoker   Smokeless Tobacco     Never Used       VITALS:  Vitals:    03/07/17 1518   BP: 110/52   Patient Site: Right Arm   Patient Position: Sitting   Cuff Size: Adult Regular   Pulse: 64   Resp: 24   Temp: 97.5  F (36.4  C)   TempSrc: Oral   Weight: 186 lb (84.4 kg)     Wt Readings from Last 3 Encounters:   03/07/17 186 lb (84.4 kg)   06/10/16 190 lb (86.2 kg)   05/24/16 189 lb (85.7 kg)       PHYSICAL EXAM:  GENERAL APPEARANCE: Alert, cooperative, no distress, appears stated age. He ambulates with a cane.   LUNGS: Clear to auscultation bilaterally, respirations unlabored .  HEART: Regular rate and rhythm, S1 and S2 normal, no murmur, rub, or gallop. Trace edema in bilateral lower extremities.   Abd: soft, nt  Skin: no acute concerns  NEUROLOGIC: No gross focal deficits  PSYCHOLOGIC: Normal mood and affect      ADDITIONAL HISTORY SUMMARIZED (FROM OLD RECORDS OR HISTORY FROM SOMEONE OTHER THAN THE PATIENT OR ANOTHER HEALTHCARE PROVIDER) (2 TOTAL): None.     DECISION TO OBTAIN EXTRA INFORMATION (OLD RECORDS REQUESTED OR HISTORY FROM ANOTHER PERSON OR ACCESSING CARE EVERYWHERE) (1 TOTAL): None.     RADIOLOGY TESTS SUMMARIZED OR ORDERED (XRAY/CT/MRI/DXA) (1 TOTAL): None.    LABS REVIEWED OR ORDERED (1 TOTAL): Labs ordered.     MEDICINE TESTS SUMMARIZED OR ORDERED (EKG/ECHO) (1 TOTAL): None.    INDEPENDENT REVIEW OF EKG OR X-RAY (2 EACH): None.     The visit lasted a total of 8 minutes face to face with the patient. Over 50% of the time was spent counseling and educating the patient about his urinary frequency.    IKasi, am scribing for and in the presence of, Dr. Day.    IDr. Day, personally performed the services described in this documentation, as scribed by Kasi Brooks in my presence, and it is both accurate and complete.    MEDICATIONS:  Current Outpatient Prescriptions   Medication Sig Dispense Refill     aspirin 325 MG tablet Take 1 tablet (325 mg total) by mouth daily.  Must take for 30 days for dvt prophylaxis. Take with meals  0     multivitamin therapeutic (THERAGRAN) tablet Take 1 tablet by mouth daily.       polyethylene glycol (MIRALAX) 17 gram packet Take 17 g by mouth daily.       atenolol (TENORMIN) 50 MG tablet Take 1 tablet (50 mg total) by mouth daily. 90 tablet 3     clopidogrel (PLAVIX) 75 mg tablet Take 1 tablet (75 mg total) by mouth daily. 90 tablet 3     simvastatin (ZOCOR) 20 MG tablet Take 1 tablet (20 mg total) by mouth bedtime. 90 tablet 3     tamsulosin (FLOMAX) 0.4 mg Cp24 Take 1 capsule (0.4 mg total) by mouth Daily after breakfast. 30 capsule 5     No current facility-administered medications for this visit.        Total Data Points: 1

## 2021-06-10 NOTE — PROGRESS NOTES
ASSESSMENT/PLAN  1. Frequency of urination  With AUA score of 21 and family history of prostate cancer as well as previous history of nephrolithiasis plan, to do diagnostic PSA, urinalysis, and consult with urology.  Specialty  helping facilitate appointment for patient next week.  Mr. Huang is aware of role for going to the ER with any symptoms of obstruction, hematuria, or increasing pain.  Will trial increased dose of tamsulosin while awaiting urology consult (patient currently on 0.4 mg and will increase up to 0.8 mg)  - Urinalysis-UC if Indicated  - PSA, Diagnostic (Prostatic-Specific Antigen)  - Ambulatory referral to Urology  - tamsulosin (FLOMAX) 0.4 mg Cp24; Take 2 capsules (0.8 mg total) by mouth Daily after breakfast.  Dispense: 90 capsule; Refill: 5    2. Essential Hypercholesterolemia  Labs reviewed and up-to-date.  Patient requests refill of his simvastatin which is renewed today.  - simvastatin (ZOCOR) 20 MG tablet; Take 1 tablet (20 mg total) by mouth at bedtime.  Dispense: 90 tablet; Refill: 3    3. Essential hypertension with goal blood pressure less than 140/90  Hypertension well controlled and labs up-to-date.  Continue with atenolol.  - atenolol (TENORMIN) 50 MG tablet; Take 1 tablet (50 mg total) by mouth daily.  Dispense: 90 tablet; Refill: 3    4. Cardiovascular disease  Patient reports no issues and requests refill of his Plavix.  He continues with this, aspirin, atenolol, statin.  - clopidogrel (PLAVIX) 75 mg tablet; Take 1 tablet (75 mg total) by mouth daily.  Dispense: 90 tablet; Refill: 3      Pt states an understanding and agrees to the above plan.          SUBJECTIVE:   Chief Complaint   Patient presents with     Prostate Check     urinating very frequently with some pain; mornings seem to be better and evenings are the worst per pt     Obeysiddhartha GRIFFIN Reina 85 y.o. male    Current Outpatient Prescriptions   Medication Sig Dispense Refill     aspirin 325 MG tablet Take 1 tablet (325  mg total) by mouth daily. Must take for 30 days for dvt prophylaxis. Take with meals  0     atenolol (TENORMIN) 50 MG tablet Take 1 tablet (50 mg total) by mouth daily. 90 tablet 3     clopidogrel (PLAVIX) 75 mg tablet Take 1 tablet (75 mg total) by mouth daily. 90 tablet 3     glycerin, adult, Supp rectal suppository Insert 1 suppository into the rectum daily as needed. 45 each 1     multivitamin therapeutic (THERAGRAN) tablet Take 1 tablet by mouth daily.       polyethylene glycol (MIRALAX) 17 gram packet Take 17 g by mouth daily.       senna-docusate (PERICOLACE) 8.6-50 mg tablet Take 2 tablets by mouth daily. 30 tablet 1     simvastatin (ZOCOR) 20 MG tablet Take 1 tablet (20 mg total) by mouth at bedtime. 90 tablet 3     tamsulosin (FLOMAX) 0.4 mg Cp24 Take 2 capsules (0.8 mg total) by mouth Daily after breakfast. 90 capsule 5     No current facility-administered medications for this visit.      Allergies: Shellfish containing products and Ampicillin   No LMP for male patient.    HPI:   Balwinder is an 85-year-old gentleman previously unknown to me who comes in today for follow-up regarding some prostate issues.  Patient reviewed this with Dr. Day at a visit 3/7/17.  By history, it was assumed to be some benign prostate hypertrophy and he was started on tamsulosin.  Patient reports compliance with the medication and no side effects but reports that his symptoms have been increasing again the last couple weeks.  He thought the medication initially had been working with decreasing his frequency of urination, but in the last couple weeks she has noticed some increase in frequency, pressure, incomplete emptying.  Patient has no previous history of significant prostate issues.  He does report history of previous history of renal stones and subsequent surgery for removal of a kidney stone on the right side.  With current symptoms he has had no hematuria or flank pain.  When asked about family history, patient  "admits his brother had a history of prostate cancer.  He is unaware of any other prostate history in his family.  Recently, patient has been dealing with some back pain issues.  He reports that the sciatica.  He denies any lower extremity numbness, tingling, weakness.  He denies issues with control of bowel or bladder.  He intermittently has issues with constipation and takes over-the-counter MiraLAX for help with management.    ROS: negative except as per HPI    OBJECTIVE:   The patient appears well, alert, oriented x 3, in no distress--- ambulates slowly with cane.  /54 (Patient Site: Right Arm, Patient Position: Sitting, Cuff Size: Adult Regular)  Pulse 64  Temp 98  F (36.7  C) (Oral)   Resp 20  Ht 5' 6\" (1.676 m) Comment: WITH SHOES ON  Wt 188 lb 12.8 oz (85.6 kg)  BMI 30.47 kg/m2    Reviewed chart including office visit with Dr. Day on 3/2/17, recent labs including cholesterol, CMP, CBC, thyroid function.  All labs within normal limits.  No evidence for PSA being completed back to 2011.  Medical history updated including surgical history, family history based on patient's history given today.    Rectal: Normal tone, small firm prostate.  Unable to palpate any nodules or asymmetry at lower edge.    Results for orders placed or performed in visit on 05/05/17   Urinalysis-UC if Indicated   Result Value Ref Range    Color, UA Yellow Colorless, Yellow, Straw, Light Yellow    Clarity, UA Clear Clear    Glucose, UA Negative Negative    Bilirubin, UA Negative Negative    Ketones, UA Negative Negative    Specific Gravity, UA 1.020 1.005 - 1.030    Blood, UA Negative Negative    pH, UA 7.0 5.0 - 8.0    Protein, UA Negative Negative mg/dL    Urobilinogen, UA 0.2 E.U./dL 0.2 E.U./dL, 1.0 E.U./dL    Nitrite, UA Negative Negative    Leukocytes, UA Negative Negative           "

## 2021-06-12 NOTE — TELEPHONE ENCOUNTER
Refill Approved    Rx renewed per Medication Renewal Policy. Medication was last renewed on 10/2/19.    Roselia Stanley, [facility] Connection Triage/Med Refill 11/3/2020     Requested Prescriptions   Pending Prescriptions Disp Refills     atenoloL (TENORMIN) 25 MG tablet 90 tablet 3     Sig: Take 1 tablet (25 mg total) by mouth daily.       Beta-Blockers Refill Protocol Passed - 11/2/2020  8:49 AM        Passed - PCP or prescribing provider visit in past 12 months or next 3 months     Last office visit with prescriber/PCP: 3/7/2017 Miguel Day MD OR same dept: 6/29/2020 Kareem Diop DO OR same specialty: 6/29/2020 Kareem Diop DO  Last physical: 10/2/2019 Last MTM visit: Visit date not found   Next visit within 3 mo: Visit date not found  Next physical within 3 mo: Visit date not found  Prescriber OR PCP: Miguel Day MD  Last diagnosis associated with med order: 1. Essential hypertension  - atenoloL (TENORMIN) 25 MG tablet; Take 1 tablet (25 mg total) by mouth daily.  Dispense: 90 tablet; Refill: 3    2. Cardiovascular disease  - clopidogreL (PLAVIX) 75 mg tablet; Take 1 tablet (75 mg total) by mouth daily.  Dispense: 90 tablet; Refill: 3    3. Essential Hypercholesterolemia  - simvastatin (ZOCOR) 20 MG tablet; Take 1 tablet (20 mg total) by mouth at bedtime.  Dispense: 90 tablet; Refill: 3    If protocol passes may refill for 12 months if within 3 months of last provider visit (or a total of 15 months).             Passed - Blood pressure filed in past 12 months     BP Readings from Last 1 Encounters:   06/29/20 112/44                clopidogreL (PLAVIX) 75 mg tablet 90 tablet 3     Sig: Take 1 tablet (75 mg total) by mouth daily.       Clopidogrel/Prasugrel/Ticagrelor Refill Protocol Failed - 11/2/2020  8:49 AM        Failed - Hemoglobin in past 12 months     Hemoglobin   Date Value Ref Range Status   10/02/2019 14.1 14.0 - 18.0 g/dL Final             Passed - PCP or  prescribing provider visit in past 6 months       Last office visit with prescriber/PCP: Visit date not found OR same dept: 6/29/2020 Kareem Diop DO OR same specialty: 6/29/2020 Kareem Diop DO Last physical: Visit date not found Last MTM visit: Visit date not found     Next appt within 3 mo: Visit date not found  Next physical within 3 mo: Visit date not found  Prescriber OR PCP: Miguel Day MD  Last diagnosis associated with med order: 1. Essential hypertension  - atenoloL (TENORMIN) 25 MG tablet; Take 1 tablet (25 mg total) by mouth daily.  Dispense: 90 tablet; Refill: 3    2. Cardiovascular disease  - clopidogreL (PLAVIX) 75 mg tablet; Take 1 tablet (75 mg total) by mouth daily.  Dispense: 90 tablet; Refill: 3    3. Essential Hypercholesterolemia  - simvastatin (ZOCOR) 20 MG tablet; Take 1 tablet (20 mg total) by mouth at bedtime.  Dispense: 90 tablet; Refill: 3    If protocol passes may refill for 6 months if within 3 months of last provider visit (or a total of 9 months).                 simvastatin (ZOCOR) 20 MG tablet 90 tablet 3     Sig: Take 1 tablet (20 mg total) by mouth at bedtime.       Statins Refill Protocol (Hmg CoA Reductase Inhibitors) Passed - 11/2/2020  8:49 AM        Passed - PCP or prescribing provider visit in past 12 months      Last office visit with prescriber/PCP: 3/7/2017 Miguel Day MD OR same dept: 6/29/2020 Kareem Diop DO OR same specialty: 6/29/2020 Kareem Diop DO  Last physical: 10/2/2019 Last MTM visit: Visit date not found   Next visit within 3 mo: Visit date not found  Next physical within 3 mo: Visit date not found  Prescriber OR PCP: Miguel Day MD  Last diagnosis associated with med order: 1. Essential hypertension  - atenoloL (TENORMIN) 25 MG tablet; Take 1 tablet (25 mg total) by mouth daily.  Dispense: 90 tablet; Refill: 3    2. Cardiovascular disease  - clopidogreL (PLAVIX) 75 mg  tablet; Take 1 tablet (75 mg total) by mouth daily.  Dispense: 90 tablet; Refill: 3    3. Essential Hypercholesterolemia  - simvastatin (ZOCOR) 20 MG tablet; Take 1 tablet (20 mg total) by mouth at bedtime.  Dispense: 90 tablet; Refill: 3    If protocol passes may refill for 12 months if within 3 months of last provider visit (or a total of 15 months).

## 2021-06-12 NOTE — TELEPHONE ENCOUNTER
RN cannot approve Refill Request    RN can NOT refill this medication Protocol failed and NO refill given. Last office visit: 3/7/2017 Miguel Day MD Last Physical: 10/2/2019 Last MTM visit: Visit date not found Last visit same specialty: 6/29/2020 Kareem Diop DO.  Next visit within 3 mo: Visit date not found  Next physical within 3 mo: Visit date not found      Roselia Stanley, [facility] Connection Triage/Med Refill 11/3/2020    Requested Prescriptions   Pending Prescriptions Disp Refills     clopidogreL (PLAVIX) 75 mg tablet 90 tablet 3     Sig: Take 1 tablet (75 mg total) by mouth daily.       Clopidogrel/Prasugrel/Ticagrelor Refill Protocol Failed - 11/2/2020  8:49 AM        Failed - Hemoglobin in past 12 months     Hemoglobin   Date Value Ref Range Status   10/02/2019 14.1 14.0 - 18.0 g/dL Final             Passed - PCP or prescribing provider visit in past 6 months       Last office visit with prescriber/PCP: Visit date not found OR same dept: 6/29/2020 Kareem Diop DO OR same specialty: 6/29/2020 Kareem Diop DO Last physical: Visit date not found Last MTM visit: Visit date not found     Next appt within 3 mo: Visit date not found  Next physical within 3 mo: Visit date not found  Prescriber OR PCP: Miguel Day MD  Last diagnosis associated with med order: 1. Essential hypertension  - atenoloL (TENORMIN) 25 MG tablet; Take 1 tablet (25 mg total) by mouth daily.  Dispense: 90 tablet; Refill: 1    2. Cardiovascular disease  - clopidogreL (PLAVIX) 75 mg tablet; Take 1 tablet (75 mg total) by mouth daily.  Dispense: 90 tablet; Refill: 3    3. Essential Hypercholesterolemia  - simvastatin (ZOCOR) 20 MG tablet; Take 1 tablet (20 mg total) by mouth at bedtime.  Dispense: 90 tablet; Refill: 1    If protocol passes may refill for 6 months if within 3 months of last provider visit (or a total of 9 months).               Signed Prescriptions Disp Refills     atenoloL (TENORMIN) 25 MG tablet 90 tablet 1     Sig: Take 1 tablet (25 mg total) by mouth daily.       Beta-Blockers Refill Protocol Passed - 11/2/2020  8:49 AM        Passed - PCP or prescribing provider visit in past 12 months or next 3 months     Last office visit with prescriber/PCP: 3/7/2017 Miguel Day MD OR same dept: 6/29/2020 Kareem Diop DO OR same specialty: 6/29/2020 Kareem Diop DO  Last physical: 10/2/2019 Last MTM visit: Visit date not found   Next visit within 3 mo: Visit date not found  Next physical within 3 mo: Visit date not found  Prescriber OR PCP: Miguel Day MD  Last diagnosis associated with med order: 1. Essential hypertension  - atenoloL (TENORMIN) 25 MG tablet; Take 1 tablet (25 mg total) by mouth daily.  Dispense: 90 tablet; Refill: 1    2. Cardiovascular disease  - clopidogreL (PLAVIX) 75 mg tablet; Take 1 tablet (75 mg total) by mouth daily.  Dispense: 90 tablet; Refill: 3    3. Essential Hypercholesterolemia  - simvastatin (ZOCOR) 20 MG tablet; Take 1 tablet (20 mg total) by mouth at bedtime.  Dispense: 90 tablet; Refill: 1    If protocol passes may refill for 12 months if within 3 months of last provider visit (or a total of 15 months).             Passed - Blood pressure filed in past 12 months     BP Readings from Last 1 Encounters:   06/29/20 112/44               simvastatin (ZOCOR) 20 MG tablet 90 tablet 1     Sig: Take 1 tablet (20 mg total) by mouth at bedtime.       Statins Refill Protocol (Hmg CoA Reductase Inhibitors) Passed - 11/2/2020  8:49 AM        Passed - PCP or prescribing provider visit in past 12 months      Last office visit with prescriber/PCP: 3/7/2017 Miguel Day MD OR same dept: 6/29/2020 Kareem Diop DO OR same specialty: 6/29/2020 Kareem Diop DO  Last physical: 10/2/2019 Last MTM visit: Visit date not found   Next visit within 3 mo: Visit date not found  Next physical  within 3 mo: Visit date not found  Prescriber OR PCP: Miguel Day MD  Last diagnosis associated with med order: 1. Essential hypertension  - atenoloL (TENORMIN) 25 MG tablet; Take 1 tablet (25 mg total) by mouth daily.  Dispense: 90 tablet; Refill: 1    2. Cardiovascular disease  - clopidogreL (PLAVIX) 75 mg tablet; Take 1 tablet (75 mg total) by mouth daily.  Dispense: 90 tablet; Refill: 3    3. Essential Hypercholesterolemia  - simvastatin (ZOCOR) 20 MG tablet; Take 1 tablet (20 mg total) by mouth at bedtime.  Dispense: 90 tablet; Refill: 1    If protocol passes may refill for 12 months if within 3 months of last provider visit (or a total of 15 months).

## 2021-06-13 NOTE — TELEPHONE ENCOUNTER
----- Message from Miguel Day MD sent at 12/17/2020 11:15 AM CST -----  Please contact patient and inform him that cholesterol levels are at goal range.  Kidney function and liver function are normal.  Electrolytes are at goal range.  No signs of diabetes.  BNP level, the heart test that we spoke about the other day, is slightly elevated but similar to what it was a few years ago.  If there is any worsening swelling in the lower legs or difficulty with breathing or increase in the cough that we discussed please contact me at clinic and we can start a water pill.  No other concerns on blood work.

## 2021-06-13 NOTE — PROGRESS NOTES
Assessment and Plan:     Patient has been advised of split billing requirements and indicates understanding: Yes  1. Routine history and physical examination of adult  Patient is here for annual exam  Obtain fasting blood work and follow-up based on results  - HM2(CBC w/o Differential)  - Lipid Cascade  - Comprehensive Metabolic Panel    2. Primary osteoarthritis of left knee  Patient ambulates with the help of a cane  Does continue to have joint problems and continues with glucosamine  - tamsulosin (FLOMAX) 0.4 mg cap; Take 2 capsules (0.8 mg total) by mouth Daily after breakfast.  Dispense: 180 capsule; Refill: 3    3. Rhonchi  On examination patient has trace edema the bilateral lower extremities with some rhonchi in the right lower lung field  Discussed evaluation with BNP level  Patient does divulge periodically has a cough but is not too bothersome for him  - BNP(B-type Natriuretic Peptide)    4. Unspecified abnormalities of breathing   Due to periodic cough and swelling we discussed BNP testing  - BNP(B-type Natriuretic Peptide)    5. Mixed hyperlipidemia  Patient is on statin therapy we will check cholesterol levels and follow-up based on results goal of LDL at 70 or below    6. Coronary artery disease involving native heart without angina pectoris, unspecified vessel or lesion type  Please see #5 above  Blood pressure at goal range    7. Chronic idiopathic constipation  Patient periodically will use a suppository if he has constipation discussed the possibility of more regularity with MiraLAX or Metamucil daily    8. Squamous cell carcinoma of skin, unspecified  Patient follows with dermatology and has a history of squamous cell  No active current concerns on skin    The patient's current medical problems were reviewed.    The following high BMI interventions were performed this visit: encouragement to exercise  The following health maintenance schedule was reviewed with the patient and provided in printed  form in the after visit summary:   Health Maintenance   Topic Date Due     TD 18+ HE  05/23/1949     ZOSTER VACCINES (1 of 2) 05/23/1981     MEDICARE ANNUAL WELLNESS VISIT  12/16/2021     FALL RISK ASSESSMENT  12/16/2021     ADVANCE CARE PLANNING  10/02/2024     Pneumococcal Vaccine: 65+ Years  Completed     INFLUENZA VACCINE RULE BASED  Completed     Pneumococcal Vaccine: Pediatrics (0 to 5 Years) and At-Risk Patients (6 to 64 Years)  Aged Out     HEPATITIS B VACCINES  Aged Out        Subjective:   Chief Complaint: Balwinder Huang is an 89 y.o. male here for an Annual Wellness visit.   HPI: Patient here for annual exam.  He has no acute concerns.  Patient continues to use a cane to help with ambulation does have history of osteoarthritis.  Follows with urology and is on Flomax and oxybutynin.  He has a history of coronary artery disease and is on statin therapy we will make sure LDL is close to 70 or below.  Obtain fasting blood work today and follow-up based on results.  Blood pressure is at goal range.  Continue current dosing of atenolol.  Patient is on glucosamine for joint discomfort.  He does note a little bit of cough periodically and on lung examination there is some rhonchi in the right lower lung field trace edema of the lower extremity we discussed BNP testing today.  Patient is an avid reader and has been trying to keep up with reading during the Covid pandemic as his wife and himself try to isolate the best they can.    Review of Systems:    Please see above.  The rest of the review of systems are negative for all systems.    Patient Care Team:  Miguel Day MD as PCP - Kareem Villegas DO as Assigned PCP     Patient Active Problem List   Diagnosis     Essential Hypercholesterolemia     Joint Stiffness Of The Hip     Essential hypertension     Coronary Artery Disease     Fainting (Syncope)     Squamous cell carcinoma of skin, unspecified     Primary osteoarthritis of left  knee     Hyperlipidemia     Coronary artery disease involving native coronary artery without angina pectoris     Chronic idiopathic constipation     History of nephrolithiasis     Past Medical History:   Diagnosis Date     History of transfusion      Hyperlipemia      Hypertension       Past Surgical History:   Procedure Laterality Date     APPENDECTOMY       JOINT REPLACEMENT Right     hip     NH CABG, VEIN, SINGLE      Description: CABG (CABG);  Recorded: 08/04/2014;     NH INSERT INTRACORONARY STENT      Description: Cath Stent Placement;  Recorded: 08/04/2014;     TOTAL HIP ARTHROPLASTY Right      total knee arthroplasty Left 06/2016    [facility]      Family History   Problem Relation Age of Onset     Prostate cancer Brother       Social History     Socioeconomic History     Marital status:      Spouse name: Not on file     Number of children: Not on file     Years of education: Not on file     Highest education level: Not on file   Occupational History     Not on file   Social Needs     Financial resource strain: Not on file     Food insecurity     Worry: Not on file     Inability: Not on file     Transportation needs     Medical: Not on file     Non-medical: Not on file   Tobacco Use     Smoking status: Never Smoker     Smokeless tobacco: Never Used   Substance and Sexual Activity     Alcohol use: No     Drug use: No     Sexual activity: Not on file   Lifestyle     Physical activity     Days per week: Not on file     Minutes per session: Not on file     Stress: Not on file   Relationships     Social connections     Talks on phone: Not on file     Gets together: Not on file     Attends [employer] service: Not on file     Active member of club or organization: Not on file     Attends meetings of clubs or organizations: Not on file     Relationship status: Not on file     Intimate partner violence     Fear of current or ex partner: Not on file     Emotionally abused: Not on file     Physically abused:  "Not on file     Forced sexual activity: Not on file   Other Topics Concern     Not on file   Social History Narrative     Not on file      Current Outpatient Medications   Medication Sig Dispense Refill     aspirin 81 MG EC tablet Take 81 mg by mouth daily.       atenoloL (TENORMIN) 25 MG tablet Take 1 tablet (25 mg total) by mouth daily. 90 tablet 1     clopidogreL (PLAVIX) 75 mg tablet Take 1 tablet (75 mg total) by mouth daily. 90 tablet 3     glucosamine sulfate 500 mg Tab Take 2 tablets by mouth daily.       multivitamin therapeutic (THERAGRAN) tablet Take 1 tablet by mouth daily.       oxybutynin (DITROPAN XL) 15 MG 24 hr tablet Take 15 mg by mouth daily.       simvastatin (ZOCOR) 20 MG tablet Take 1 tablet (20 mg total) by mouth at bedtime. 90 tablet 1     tamsulosin (FLOMAX) 0.4 mg cap Take 2 capsules (0.8 mg total) by mouth Daily after breakfast. 180 capsule 3     vit C/vit E/lutein/min/omega-3 (OCUVITE ORAL) Take 1 tablet by mouth daily.       No current facility-administered medications for this visit.       Objective:   Vital Signs:   Visit Vitals  /65 (Patient Site: Left Arm, Patient Position: Sitting, Cuff Size: Adult Regular)   Pulse 68   Temp 97.2  F (36.2  C) (Oral)   Resp 18   Ht 5' 4.5\" (1.638 m)   Wt 175 lb 12.8 oz (79.7 kg)   BMI 29.71 kg/m           VisionScreening:  No exam data present     PHYSICAL EXAM  Physical Examination: General appearance - alert, well appearing, and in no distress  Mental status - alert, oriented to person, place, and time  Eyes - pupils equal and reactive, extraocular eye movements intact  Chest - rhonchi noted RLL  Heart - normal rate, regular rhythm, normal S1, S2, no murmurs, rubs, clicks or gallops  Abdomen - soft, nontender, nondistended, no masses or organomegaly  Neurological - alert, oriented, normal speech, no focal findings or movement disorder noted  Musculoskeletal - no joint tenderness, deformity or swelling  Skin - normal coloration and turgor, no " rashes, no suspicious skin lesions noted  LE BL: trace edmea      Assessment Results 12/16/2020   Activities of Daily Living No help needed   Instrumental Activities of Daily Living No help needed   Get Up and Go Score -   Mini Cog Total Score 3   Some recent data might be hidden     A Mini-Cog score of 0-2 suggests the possibility of dementia, score of 3-5 suggests no dementia    Identified Health Risks:     The patient was provided with suggestions to help him develop a healthy physical lifestyle.   He is at risk for lack of exercise and has been provided with information to increase physical activity for the benefit of his well-being.  The patient was counseled and encouraged to consider modifying their diet and eating habits. He was provided with information on recommended healthy diet options.  Patient's advanced directive was discussed and I am comfortable with the patient's wishes.

## 2021-06-14 NOTE — TELEPHONE ENCOUNTER
Reason for Call:  Request for result letter    Name of test or procedure: Lab results     Date of test of procedure: 12/16/2020    Location of the test or procedure: [place]    OK to leave the result message on voice mail or with a family member? Yes    Phone number Patient can be reached at:   Cell number on file:    Telephone Information:   Mobile [phone]       Additional comments: Patient called that he spoke to someone couple weeks ago of his results and was told he will get a copy in the mail. Patient stated he hasnt received the result letter yet. Would like a copy mail out today. Communicated to patient I will send message the provider's team. He stated understanding.      Call taken on 1/7/2021 at 12:54 PM by Lg Pack

## 2021-06-15 PROBLEM — K59.04 CHRONIC IDIOPATHIC CONSTIPATION: Status: ACTIVE | Noted: 2017-03-30

## 2021-06-15 PROBLEM — Z87.442 HISTORY OF NEPHROLITHIASIS: Status: ACTIVE | Noted: 2017-05-05

## 2021-06-17 NOTE — PATIENT INSTRUCTIONS - HE
Patient Instructions by Miguel Day MD at 10/2/2019  2:30 PM     Author: Miguel Day MD Service: -- Author Type: Physician    Filed: 10/2/2019  3:22 PM Encounter Date: 10/2/2019 Status: Signed    : Miguel Day MD (Physician)         Patient Education     Your Health Risk Assessment indicates you feel you are not in good physical health.    A healthy lifestyle helps keep the body fit and the mind alert. It helps protect you from disease, helps you fight disease, and helps prevent chronic disease (disease that doesn't go away) from getting worse. This is important as you get older and begin to notice twinges in muscles and joints and a decline in the strength and stamina you once took for granted. A healthy lifestyle includes good healthcare, good nutrition, weight control, recreation, and regular exercise. Avoid harmful substances and do what you can to keep safe. Another part of a healthy lifestyle is stay mentally active and socially involved.    Good healthcare     Have a wellness visit every year.     If you have new symptoms, let us know right away. Don't wait until the next checkup.     Take medicines exactly as prescribed and keep your medicines in a safe place. Tell us if your medicine causes problems.   Healthy diet and weight control     Eat 3 or 4 small, nutritious, low-fat, high-fiber meals a day. Include a variety of fruits, vegetables, and whole-grain foods.     Make sure you get enough calcium in your diet. Calcium, vitamin D, and exercise help prevent osteoporosis (bone thinning).     If you live alone, try eating with others when you can. That way you get a good meal and have company while you eat it.     Try to keep a healthy weight. If you eat more calories than your body uses for energy, it will be stored as fat and you will gain weight.     Recreation   Recreation is not limited to sports and team events. It includes any activity that provides relaxation,  interest, enjoyment, and exercise. Recreation provides an outlet for physical, mental, and social energy. It can give a sense of worth and achievement. It can help you stay healthy.       Patient Education     Exercise for a Healthier Heart  You may wonder how you can improve the health of your heart. If youre thinking about exercise, youre on the right track. You dont need to become an athlete, but you do need a certain amount of brisk exercise to help strengthen your heart. If you have been diagnosed with a heart condition, your doctor may recommend exercise to help stabilize your condition. To help make exercise a habit, choose safe, fun activities.       Be sure to check with your health care provider before starting an exercise program.    Why exercise?  Exercising regularly offers many healthy rewards. It can help you do all of the following:    Improve your blood cholesterol levels to help prevent further heart trouble    Lower your blood pressure to help prevent a stroke or heart attack    Control diabetes, or reduce your risk of getting this disease    Improve your heart and lung function    Reach and maintain a healthy weight    Make your muscles stronger and more limber so you can stay active    Prevent falls and fractures by slowing the loss of bone mass (osteoporosis)    Manage stress better  Exercise tips  Ease into your routine. Set small goals. Then build on them.  Exercise on most days. Aim for a total of 150 or more minutes of moderate to  vigorous intensity activity each week. Consider 40 minutes, 3 to 4 times a week. For best results, activity should last for 40 minutes on average. It is OK to work up to the 40 minute period over time. Examples of moderate-intensity activity is walking one mile in 15 minutes or 30 to 45 minutes of yard work.  Step up your daily activity level. Along with your exercise program, try being more active throughout the day. Walk instead of drive. Do more household  tasks or yard work.  Choose one or more activities you enjoy. Walking is one of the easiest things you can do. You can also try swimming, riding a bike, or taking an exercise class.  Stop exercising and call your doctor if you:    Have chest pain or feel dizzy or lightheaded    Feel burning, tightness, pressure, or heaviness in your chest, neck, shoulders, back, or arms    Have unusual shortness of breath    Have increased joint or muscle pain    Have palpitations or an irregular heartbeat      5532-8534 [employer]. [address] [place] [address]. All rights reserved. This information is not intended as a substitute for professional medical care. Always follow your healthcare professional's instructions.         Patient Education   Understanding [employer] MyPlate  The USDA (US Department of Agriculture) has guidelines to help you make healthy food choices. These are called MyPlate. MyPlate shows the food groups that make up healthy meals using the image of a place setting. Before you eat, think about the healthiest choices for what to put onto your plate or into your cup or bowl. To learn more about building a healthy plate, visit www.choosemyplate.gov.       The Food Groups    Fruits: Any fruit or 100% fruit juice counts as part of the Fruit Group. Fruits may be fresh, canned, frozen, or dried, and may be whole, cut-up, or pureed. Make half your plate fruits and vegetables.    Vegetables: Any vegetable or 100% vegetable juice counts as a member of the Vegetable Group. Vegetables may be fresh, frozen, canned, or dried. They can be served raw or cooked and may be whole, cut-up, or mashed. Make half your plate fruits and vegetables.     Grains: All foods made from grains are part of the Grains Group. These include wheat, rice, oats, cornmeal, and barley such as bread, pasta, oatmeal, cereal, tortillas, and grits. Grains should be no more than a quarter of your plate. At least half of your grains  should be whole grains.    Protein: This group includes meat, poultry, seafood, beans and peas, eggs, processed soy products (like tofu), nuts (including nut butters), and seeds. Make protein choices no more than a quarter of your plate. Meat and poultry choices should be lean or low fat.    Dairy: All fluid milk products and foods made from milk that contain calcium, like yogurt and cheese are part of the Dairy Group. (Foods that have little calcium, such as cream, butter, and cream cheese, are not part of the group.) Most dairy choices should be low-fat or fat-free.    Oils: These are fats that are liquid at room temperature. They include canola, corn, olive, soybean, and sunflower oil. Foods that are mainly oil include mayonnaise, certain salad dressings, and soft margarines. You should have only 5 to 7 teaspoons of oils a day. You probably already get this much from the food you eat.  Use [employer] to Help Build Your Meals  The SuperTracker can help you plan and track your meals and activity. You can look up individual foods to see or compare their nutritional value. You can get guidelines for what and how much you should eat. You can compare your food choices. And you can assess personal physical activities and see ways you can improve. Go to www.[employer].gov/supertracker/.    3454-4253 [employer]. [address] [place] [address], [place] [address]. All rights reserved. This information is not intended as a substitute for professional medical care. Always follow your healthcare professional's instructions.           Patient Education    Home Fire Safety  Each year, thousands of people, including children, are injured and killed in home fires. Children are often curious about fire, and may not understand the dangers. This makes home fire safety practices especially important. Three important things you can do to keep your home safe from fire are:    Install smoke alarms in your home and make sure  they work properly.    Teach children not to play with matches, lighters, and other materials that can be used to start fires. And keep these materials out of childrens reach.    Teach children what to do in case of fire. Create a fire safety action plan and practice it.  Read on for more details about keeping your family and home safe from fire.        Being Prepared for a Fire  A home fire can happen at any time. The following can help you be prepared:    Install smoke alarms on every level of your home, including the basement and outside all sleeping areas. This simple step cuts your familys risk of dying in a fire nearly in half.    Test smoke alarms monthly, and change the batteries once a year or when the alarm chirps.    Dont disable smoke alarms, even for a short time.    Ask your local fire department for tips on where to place smoke alarms in your home.    Replace all smoke alarms every 10 years.    Consider using voice smoke alarms. These alarms allow you to substitute your own voice for the alarm sound. They are helpful because many children dont wake up to the sound of a regular smoke alarm.    Install carbon monoxide detectors near sleeping areas.    Be aware that carbon monoxide is a byproduct of smoke that can be deadly. Its a gas that you cant see, smell, or taste.    Consider buying a combination smoke alarm / carbon monoxide detector.    Keep fire extinguishers in the home.    Keep them in accessible locations, especially in the kitchen.    Check usage dates to make sure they are not .    Use fire extinguishers only when the fire is in a contained area and is not spreading. (Otherwise, you should focus on getting out of the home.)    Train adults to use fire extinguishers. (Children should focus on getting out of the home during a fire.)    If you live in an apartment, talk to your landlord about where smoke alarms are and how often they are tested. Also ask about fire extinguisher  locations and emergency exit routes.  Indoor Fire Safety  Many things in your home are potential fire hazards. Follow these steps to help keep your home safe.    Be careful in the kitchen.    Never leave food thats cooking unattended.    If a fire breaks out in a cooking pan, put a lid on it to smother it. And never throw water on a grease fire. It will make the fire worse.    Conduct a home safety inspection. Look for anything, such as frayed wires and cords, that can cause a fire. Fix or remove any fire safety hazards you find.    Keep all matches and lighters in a secured drawer or cabinet out of the reach of children. Use childproof lighters.    Check to make sure all appliances, including the stove, are turned off before leaving the home.    Know where the gas main shut-off is located.    Make sure space heaters are stable and have protective covers. Keep them at least 3 feet from anything that can burn, such as curtains. Dont use space heaters in areas where young kids spend time alone.    Keep flammable liquids such as kerosene and gasoline locked up and safely stored away from kids and heat.    Keep all smoking materials out of reach of children. And never smoke in bed. If possible, smoke outdoors only.  Outdoor Fire Safety  Fire can be a hazard outdoors as well as indoors. When outdoors, be sure to do the following:    Always supervise kids near a barbecue grill, campfire, or portable stove.    Dont use fire pits around children. Kids can fall into them, and pits can be hot even after the fire goes out.    Keep a garden hose or fire extinguisher handy when cooking outdoors in case of fire.  Teaching Your Child About Fire Safety  One of the best ways to keep your home safe from fire is education. Make sure everyone in your family knows fire safety rules, including children.    Teach your children the dangers of matches, lighters, and other dangerous items.    Teach them to never touch these and other objects  that are hot, such as candles.    Have them tell you right away whenever they find matches or lighters. Explain that these items are tools for grown-ups, not toys. And never amuse children with matches or lighters.    Round up all matches and lighters and store them safely. In case you missed some, ask your children to tell you where any are located throughout your home.    Never leave a child alone in a room with a lit candle. Dont allow teens to have candles in their rooms.    Show children what to do in case of fire.    Be sure your kids know what the fire alarm sounds like and what to do if it goes off.    Teach kids what to do if their clothes catch fire: Stop, Drop to the ground, and Roll until the fire is put out. They should also cover their face with their hands. Practice these steps with your children. Make sure they understand that running will make the fire burn faster.    Show children how to crawl below smoke during a fire.    Make sure kids know at least two escape routes from each room in the home. These escape routes can be windows.    Teach kids to test doors for nearby fire by feeling for heat with the back of their hand. If the door is warm or hot, they should try their second exit.    Explain to children that they cant hide from a fire. Hiding in a closet or under a bed wont make them safe. Instead, they should try to escape the home. And if they cant escape, they should let others know they are trapped. They can do this by shutting the door to the room, opening a window, and turning on the lights.    Talk to your local fire department.    Introduce your children to a . Let them know that firefighters will look different when in full protective gear. Tell them to never hide from firefighters, and to follow all directions from firefighters during a fire.    Find out if the fire department has a fire safety program for kids.      Create a Fire Safety Plan  Create a plan for your family  to follow in case of a fire. Try making it a family project. Important steps for the plan include leaving the home right away and having a designated meeting place.    Make sure your child understands to get out and stay out. He or she should get out of the home immediately and not go back in, even if family members or pets are still inside.    Decide on a safe meeting place away from the home for everyone to gather.    Teach children to call 911 or emergency services from a cell phone or neighbors phone. Make sure they know to do this only after they are safely out of the home.    Teach your children the fire safety plan. Practice it and make sure they understand it.    Have fire drills twice a year to keep your children prepared in case of fire.    Visit the National Fire Protection Association web site at www.nfpa.org for more information.      6016-1314 [employer]. [address] [place], [place] [address]. All rights reserved. This information is not intended as a substitute for professional medical care. Always follow your healthcare professional's instructions.         Patient Education   Preventing Falls in the Home  As you get older, falls are more likely. Thats because your reaction time slows. Your muscles and joints may also get stiffer, making them less flexible. Illness, medications, and vision changes can also affect your balance. A fall could leave you unable to live on your own. To make your home safer, follow these tips:    Floors    Put nonskid pads under area rugs.    Remove throw rugs.    Replace worn floor coverings.    Tack carpets firmly to each step on carpeted stairs. Put nonskid strips on the edges of uncarpeted stairs.    Keep floors and stairs free of clutter and cords.    Arrange furniture so there are clear pathways.    Clean up any spills right away.    Bathrooms    Install grab bars in the tub or shower.    Apply nonskid strips or put a nonskid rubber mat in the tub or  shower.    Sit on a bath chair to bathe.    Use bathmats with nonskid backing.    Lighting    Keep a flashlight in each room.    Put a nightlight along the pathway between the bedroom and the bathroom.    1943-1593 [employer]. [address] [place] [address]. All rights reserved. This information is not intended as a substitute for professional medical care. Always follow your healthcare professional's instructions.           Advance Directive  Patients advance directive was discussed and I am comfortable with the patients wishes.  Patient Education   Personalized Prevention Plan  You are due for the preventive services outlined below.  Your care team is available to assist you in scheduling these services.  If you have already completed any of these items, please share that information with your care team to update in your medical record.  Health Maintenance   Topic Date Due   ? TD 18+ HE  05/23/1949   ? ZOSTER VACCINES (1 of 2) 05/23/1981   ? MEDICARE ANNUAL WELLNESS VISIT  08/03/2019   ? FALL RISK ASSESSMENT  08/03/2019   ? PNEUMOCOCCAL IMMUNIZATION 65+ LOW/MEDIUM RISK (2 of 2 - PPSV23) 08/03/2019   ? ADVANCE CARE PLANNING  08/03/2023   ? INFLUENZA VACCINE RULE BASED  Completed

## 2021-06-17 NOTE — TELEPHONE ENCOUNTER
Refill Approved    Rx renewed per Medication Renewal Policy. Medication was last renewed on 11/3/20, last OV 12/16/20.    Iliana Traore, [facility] Triage/Med Refill 4/26/2021     Requested Prescriptions   Pending Prescriptions Disp Refills     atenoloL (TENORMIN) 25 MG tablet [Pharmacy Med Name: ATENOLOL 25MG TABLETS] 90 tablet 1     Sig: TAKE 1 TABLET(25 MG) BY MOUTH DAILY       Beta-Blockers Refill Protocol Passed - 4/26/2021  5:39 AM        Passed - PCP or prescribing provider visit in past 12 months or next 3 months     Last office visit with prescriber/PCP: Visit date not found OR same dept: 6/29/2020 Kareem Diop DO OR same specialty: 6/29/2020 Kareem Diop DO  Last physical: 12/16/2020 Last MTM visit: Visit date not found   Next visit within 3 mo: Visit date not found  Next physical within 3 mo: Visit date not found  Prescriber OR PCP: Miguel Day MD  Last diagnosis associated with med order: 1. Essential hypertension  - atenoloL (TENORMIN) 25 MG tablet [Pharmacy Med Name: ATENOLOL 25MG TABLETS]; TAKE 1 TABLET(25 MG) BY MOUTH DAILY  Dispense: 90 tablet; Refill: 1    2. Essential Hypercholesterolemia  - simvastatin (ZOCOR) 20 MG tablet [Pharmacy Med Name: SIMVASTATIN 20MG TABLETS]; TAKE 1 TABLET(20 MG) BY MOUTH AT BEDTIME  Dispense: 90 tablet; Refill: 1    If protocol passes may refill for 12 months if within 3 months of last provider visit (or a total of 15 months).             Passed - Blood pressure filed in past 12 months     BP Readings from Last 1 Encounters:   12/16/20 135/65                simvastatin (ZOCOR) 20 MG tablet [Pharmacy Med Name: SIMVASTATIN 20MG TABLETS] 90 tablet 1     Sig: TAKE 1 TABLET(20 MG) BY MOUTH AT BEDTIME       Statins Refill Protocol (Hmg CoA Reductase Inhibitors) Passed - 4/26/2021  5:39 AM        Passed - PCP or prescribing provider visit in past 12 months      Last office visit with prescriber/PCP: Visit date not found OR same  dept: 6/29/2020 Kareem Diop DO OR same specialty: 6/29/2020 Kareem Diop DO  Last physical: 12/16/2020 Last MTM visit: Visit date not found   Next visit within 3 mo: Visit date not found  Next physical within 3 mo: Visit date not found  Prescriber OR PCP: Miguel Day MD  Last diagnosis associated with med order: 1. Essential hypertension  - atenoloL (TENORMIN) 25 MG tablet [Pharmacy Med Name: ATENOLOL 25MG TABLETS]; TAKE 1 TABLET(25 MG) BY MOUTH DAILY  Dispense: 90 tablet; Refill: 1    2. Essential Hypercholesterolemia  - simvastatin (ZOCOR) 20 MG tablet [Pharmacy Med Name: SIMVASTATIN 20MG TABLETS]; TAKE 1 TABLET(20 MG) BY MOUTH AT BEDTIME  Dispense: 90 tablet; Refill: 1    If protocol passes may refill for 12 months if within 3 months of last provider visit (or a total of 15 months).

## 2021-06-18 NOTE — PATIENT INSTRUCTIONS - HE
Patient Instructions by Miguel Day MD at 12/16/2020  9:50 AM     Author: Miguel Day MD Service: -- Author Type: Physician    Filed: 12/16/2020 12:26 PM Encounter Date: 12/16/2020 Status: Signed    : Miguel Day MD (Physician)         Patient Education     Your Health Risk Assessment indicates you feel you are not in good physical health.    A healthy lifestyle helps keep the body fit and the mind alert. It helps protect you from disease, helps you fight disease, and helps prevent chronic disease (disease that doesn't go away) from getting worse. This is important as you get older and begin to notice twinges in muscles and joints and a decline in the strength and stamina you once took for granted. A healthy lifestyle includes good healthcare, good nutrition, weight control, recreation, and regular exercise. Avoid harmful substances and do what you can to keep safe. Another part of a healthy lifestyle is stay mentally active and socially involved.    Good healthcare     Have a wellness visit every year.     If you have new symptoms, let us know right away. Don't wait until the next checkup.     Take medicines exactly as prescribed and keep your medicines in a safe place. Tell us if your medicine causes problems.   Healthy diet and weight control     Eat 3 or 4 small, nutritious, low-fat, high-fiber meals a day. Include a variety of fruits, vegetables, and whole-grain foods.     Make sure you get enough calcium in your diet. Calcium, vitamin D, and exercise help prevent osteoporosis (bone thinning).     If you live alone, try eating with others when you can. That way you get a good meal and have company while you eat it.     Try to keep a healthy weight. If you eat more calories than your body uses for energy, it will be stored as fat and you will gain weight.     Recreation   Recreation is not limited to sports and team events. It includes any activity that provides relaxation,  interest, enjoyment, and exercise. Recreation provides an outlet for physical, mental, and social energy. It can give a sense of worth and achievement. It can help you stay healthy.       Patient Education     Exercise for a Healthier Heart  You may wonder how you can improve the health of your heart. If youre thinking about exercise, youre on the right track. You dont need to become an athlete, but you do need a certain amount of brisk exercise to help strengthen your heart. If you have been diagnosed with a heart condition, your doctor may recommend exercise to help stabilize your condition. To help make exercise a habit, choose safe, fun activities.       Be sure to check with your health care provider before starting an exercise program.    Why exercise?  Exercising regularly offers many healthy rewards. It can help you do all of the following:    Improve your blood cholesterol levels to help prevent further heart trouble    Lower your blood pressure to help prevent a stroke or heart attack    Control diabetes, or reduce your risk of getting this disease    Improve your heart and lung function    Reach and maintain a healthy weight    Make your muscles stronger and more limber so you can stay active    Prevent falls and fractures by slowing the loss of bone mass (osteoporosis)    Manage stress better  Exercise tips  Ease into your routine. Set small goals. Then build on them.  Exercise on most days. Aim for a total of 150 or more minutes of moderate to  vigorous intensity activity each week. Consider 40 minutes, 3 to 4 times a week. For best results, activity should last for 40 minutes on average. It is OK to work up to the 40 minute period over time. Examples of moderate-intensity activity is walking one mile in 15 minutes or 30 to 45 minutes of yard work.  Step up your daily activity level. Along with your exercise program, try being more active throughout the day. Walk instead of drive. Do more household  tasks or yard work.  Choose one or more activities you enjoy. Walking is one of the easiest things you can do. You can also try swimming, riding a bike, or taking an exercise class.  Stop exercising and call your doctor if you:    Have chest pain or feel dizzy or lightheaded    Feel burning, tightness, pressure, or heaviness in your chest, neck, shoulders, back, or arms    Have unusual shortness of breath    Have increased joint or muscle pain    Have palpitations or an irregular heartbeat      1792-5989 [employer]. [address] [place] [id number]. All rights reserved. This information is not intended as a substitute for professional medical care. Always follow your healthcare professional's instructions.         Patient Education   Understanding [employer] MyPlate  The USDA (US Department of Agriculture) has guidelines to help you make healthy food choices. These are called MyPlate. MyPlate shows the food groups that make up healthy meals using the image of a place setting. Before you eat, think about the healthiest choices for what to put onto your plate or into your cup or bowl. To learn more about building a healthy plate, visit www.choosemyplate.gov.       The Food Groups    Fruits: Any fruit or 100% fruit juice counts as part of the Fruit Group. Fruits may be fresh, canned, frozen, or dried, and may be whole, cut-up, or pureed. Make half your plate fruits and vegetables.    Vegetables: Any vegetable or 100% vegetable juice counts as a member of the Vegetable Group. Vegetables may be fresh, frozen, canned, or dried. They can be served raw or cooked and may be whole, cut-up, or mashed. Make half your plate fruits and vegetables.     Grains: All foods made from grains are part of the Grains Group. These include wheat, rice, oats, cornmeal, and barley such as bread, pasta, oatmeal, cereal, tortillas, and grits. Grains should be no more than a quarter of your plate. At least half of your grains  should be whole grains.    Protein: This group includes meat, poultry, seafood, beans and peas, eggs, processed soy products (like tofu), nuts (including nut butters), and seeds. Make protein choices no more than a quarter of your plate. Meat and poultry choices should be lean or low fat.    Dairy: All fluid milk products and foods made from milk that contain calcium, like yogurt and cheese are part of the Dairy Group. (Foods that have little calcium, such as cream, butter, and cream cheese, are not part of the group.) Most dairy choices should be low-fat or fat-free.    Oils: These are fats that are liquid at room temperature. They include canola, corn, olive, soybean, and sunflower oil. Foods that are mainly oil include mayonnaise, certain salad dressings, and soft margarines. You should have only 5 to 7 teaspoons of oils a day. You probably already get this much from the food you eat.  Use Social [employer] to Help Build Your Meals  The SuperTracker can help you plan and track your meals and activity. You can look up individual foods to see or compare their nutritional value. You can get guidelines for what and how much you should eat. You can compare your food choices. And you can assess personal physical activities and see ways you can improve. Go to www.MedAlliance.gov/supertracker/.    9188-2101 [employer]. [address] [place] [address], [place] [address]. All rights reserved. This information is not intended as a substitute for professional medical care. Always follow your healthcare professional's instructions.             Advance Directive  Patients advance directive was discussed and I am comfortable with the patients wishes.  Patient Education   Personalized Prevention Plan  You are due for the preventive services outlined below.  Your care team is available to assist you in scheduling these services.  If you have already completed any of these items, please share that information with your care  team to update in your medical record.  Health Maintenance   Topic Date Due   ? TD 18+ HE  05/23/1949   ? ZOSTER VACCINES (1 of 2) 05/23/1981   ? MEDICARE ANNUAL WELLNESS VISIT  12/16/2021   ? FALL RISK ASSESSMENT  12/16/2021   ? ADVANCE CARE PLANNING  12/16/2025   ? Pneumococcal Vaccine: 65+ Years  Completed   ? INFLUENZA VACCINE RULE BASED  Completed   ? Pneumococcal Vaccine: Pediatrics (0 to 5 Years) and At-Risk Patients (6 to 64 Years)  Aged Out   ? HEPATITIS B VACCINES  Aged Out

## 2021-06-19 NOTE — LETTER
Letter by Miguel Day MD at      Author: Miguel Day MD Service: -- Author Type: --    Filed:  Encounter Date: 10/8/2019 Status: Signed         Balwinder Huang  [facility]  [place] [address]             October 8, 2019         Dear Mr. Huang,    Below are the results from your recent visit:    Resulted Orders   Comprehensive Metabolic Panel   Result Value Ref Range    Sodium 140 136 - 145 mmol/L    Potassium 4.5 3.5 - 5.0 mmol/L    Chloride 104 98 - 107 mmol/L    CO2 27 22 - 31 mmol/L    Anion Gap, Calculation 9 5 - 18 mmol/L    Glucose 92 70 - 125 mg/dL    BUN 19 8 - 28 mg/dL    Creatinine 1.02 0.70 - 1.30 mg/dL    GFR MDRD Af Amer >60 >60 mL/min/1.73m2    GFR MDRD Non Af Amer >60 >60 mL/min/1.73m2    Bilirubin, Total 0.4 0.0 - 1.0 mg/dL    Calcium 9.4 8.5 - 10.5 mg/dL    Protein, Total 6.8 6.0 - 8.0 g/dL    Albumin 3.8 3.5 - 5.0 g/dL    Alkaline Phosphatase 68 45 - 120 U/L    AST 23 0 - 40 U/L    ALT 19 0 - 45 U/L    Narrative    Fasting Glucose reference range is 70-99 mg/dL per  American Diabetes Association (ADA) guidelines.   HM2(CBC w/o Differential)   Result Value Ref Range    WBC 9.1 4.0 - 11.0 thou/uL    RBC 4.20 (L) 4.40 - 6.20 mill/uL    Hemoglobin 14.1 14.0 - 18.0 g/dL    Hematocrit 41.7 40.0 - 54.0 %    MCV 99 80 - 100 fL    MCH 33.6 27.0 - 34.0 pg    MCHC 33.9 32.0 - 36.0 g/dL    RDW 12.0 11.0 - 14.5 %    Platelets 195 140 - 440 thou/uL    MPV 8.1 7.0 - 10.0 fL   Lipid Cascade   Result Value Ref Range    Cholesterol 145 <=199 mg/dL    Triglycerides 117 <=149 mg/dL    HDL Cholesterol 40 >=40 mg/dL    LDL Calculated 82 <=129 mg/dL    Patient Fasting > 8hrs? Yes        Please inform patient that cholesterol levels are at goal range.  Kidney and liver function are normal with no signs of diabetes.  No concerns on blood work.    Please call with questions or contact us using MyChart.    Sincerely,        Electronically signed by Miguel Day MD

## 2021-06-19 NOTE — PROGRESS NOTES
Assessment and Plan:     1. Routine physical examination  We will update patient with pneumococcal 23 today  He will check with insurance about coverage for the shingles vaccine  We will obtain labs today and follow-up based on results  - Lipid Cascade  - Comprehensive Metabolic Panel  - HM2(CBC w/o Differential)    2. Essential hypertension  Patient's blood pressure is at goal range however he is expressing some symptoms of orthostatic hypotension  We are going to decrease his atenolol from 50 mg daily to 25 mg daily    3. Cardiovascular disease  Continue with Plavix at this time with his history of coronary artery disease  We will check cholesterol levels today and continue a statin therapy  - clopidogrel (PLAVIX) 75 mg tablet; Take 1 tablet (75 mg total) by mouth daily.  Dispense: 90 tablet; Refill: 3    4. Squamous cell carcinoma  Patient continues to follow dermatology yearly he has not been having any new findings in the recent past    5. BPH (benign prostatic hyperplasia)  Patient has had improvement with urinary frequency with the use of tamsulosin  He is following with urology    6. History of nephrolithiasis  Patient has not had kidney stones for quite some time  Keeping well hydrated discussed with the patient to avoid recurrence    The patient's current medical problems were reviewed.    The following high BMI interventions were performed this visit: encouragement to exercise and weight monitoring  The following health maintenance schedule was reviewed with the patient and provided in printed form in the after visit summary:   Health Maintenance   Topic Date Due     TD 18+ HE  05/23/1949     ADVANCE DIRECTIVES DISCUSSED WITH PATIENT  05/23/1949     ZOSTER VACCINE  05/23/1991     PNEUMOCOCCAL POLYSACCHARIDE VACCINE AGE 65 AND OVER  05/23/1996     FALL RISK ASSESSMENT  03/07/2018     INFLUENZA VACCINE RULE BASED (1) 08/01/2018     PNEUMOCOCCAL CONJUGATE VACCINE FOR ADULTS (PCV13 OR PREVNAR)  Completed         Subjective:   Chief Complaint: Balwinder Huang is an 87 y.o. male here for an Annual Wellness visit.   HPI: Patient is here for annual exam and fasting labs  He needs to be updated with pneumococcal 23 today  We will obtain labs and follow-up based on results  Patient will discuss with insurance if they have coverage for the new shingles vaccine-he has a history of shingles in the past but with the new vaccine having high percentage of prevention for recurrence I encouraged him to get the vaccine if covered by insurance  His blood pressures in the low end of normal and expresses some orthostatic hypotension symptoms-discussed with him decreasing atenolol dosing down to 25 mg  He takes simvastatin for elevated cholesterol levels and his history of coronary artery disease we will continue with Plavix and statin therapy at this time  He ambulates with the aid of a cane his right hip is still bothersome to him but is not providing him from large amounts of activity  He rates on a regular basis we discussed lifestyle to avoid regression of cognition    Review of Systems:   Please see above.  The rest of the review of systems are negative for all systems.    Patient Care Team:  Miguel Day MD as PCP - General     Patient Active Problem List   Diagnosis     Essential Hypercholesterolemia     Joint Stiffness Of The Hip     Essential hypertension     Coronary Artery Disease     Fainting (Syncope)     Squamous cell carcinoma     Primary osteoarthritis of left knee     Hyperlipidemia     Coronary artery disease involving native coronary artery without angina pectoris     Chronic idiopathic constipation     History of nephrolithiasis     Past Medical History:   Diagnosis Date     History of transfusion      Hyperlipemia      Hypertension       Past Surgical History:   Procedure Laterality Date     JOINT REPLACEMENT Right     hip     ND CABG, VEIN, SINGLE      Description: CABG (CABG);  Recorded: 08/04/2014;     ND  "INSERT INTRACORONARY STENT      Description: Cath Stent Placement;  Recorded: 08/04/2014;     TOTAL HIP ARTHROPLASTY Right      total knee arthroplasty Left 06/2016    [facility]      Family History   Problem Relation Age of Onset     Prostate cancer Brother       Social History     Social History     Marital status:      Spouse name: N/A     Number of children: N/A     Years of education: N/A     Occupational History     Not on file.     Social History Main Topics     Smoking status: Never Smoker     Smokeless tobacco: Never Used     Alcohol use No     Drug use: No     Sexual activity: Not on file     Other Topics Concern     Not on file     Social History Narrative      Current Outpatient Prescriptions   Medication Sig Dispense Refill     aspirin 325 MG tablet Take 1 tablet (325 mg total) by mouth daily. Must take for 30 days for dvt prophylaxis. Take with meals  0     multivitamin therapeutic (THERAGRAN) tablet Take 1 tablet by mouth daily.       polyethylene glycol (MIRALAX) 17 gram packet Take 17 g by mouth daily.       tamsulosin (FLOMAX) 0.4 mg Cp24 Take 2 capsules (0.8 mg total) by mouth Daily after breakfast. 180 capsule 3     atenolol (TENORMIN) 25 MG tablet Take 1 tablet (25 mg total) by mouth daily. 90 tablet 3     clopidogrel (PLAVIX) 75 mg tablet Take 1 tablet (75 mg total) by mouth daily. 90 tablet 3     simvastatin (ZOCOR) 20 MG tablet Take 1 tablet (20 mg total) by mouth at bedtime. 90 tablet 3     No current facility-administered medications for this visit.       Objective:   Vital Signs:   Visit Vitals     /56 (Patient Site: Left Arm, Patient Position: Sitting, Cuff Size: Adult Regular)     Pulse 80     Temp 97.1  F (36.2  C) (Oral)     Resp 16     Ht 5' 6\" (1.676 m)     Wt 180 lb (81.6 kg)     BMI 29.05 kg/m2        VisionScreening:  No exam data present     PHYSICAL EXAM  Physical Examination: General appearance - alert, well appearing, and in no distress  Mental status - alert, " oriented to person, place, and time  Eyes - pupils equal and reactive, extraocular eye movements intact  Ears - bilateral TM's and external ear canals normal  Nose - normal and patent, no erythema, discharge or polyps  Mouth - mucous membranes moist, pharynx normal without lesions  Lymphatics - no palpable lymphadenopathy, no hepatosplenomegaly  Chest - clear to auscultation, no wheezes, rales or rhonchi, symmetric air entry  Heart - normal rate, regular rhythm, normal S1, S2, no murmurs, rubs, clicks or gallops  Abdomen - soft, nontender, nondistended, no masses or organomegaly  Back exam - full range of motion, no tenderness, palpable spasm or pain on motion  Neurological - alert, oriented, normal speech, no focal findings or movement disorder noted  Musculoskeletal - no joint tenderness, deformity or swelling  Extremities - peripheral pulses normal, no pedal edema, no clubbing or cyanosis  Skin - normal coloration and turgor, no rashes, no suspicious skin lesions noted      Assessment Results 8/3/2018   Activities of Daily Living No help needed   Instrumental Activities of Daily Living No help needed   Get Up and Go Score Less than 12 seconds   Mini Cog Total Score 5   Some recent data might be hidden     A Mini-Cog score of 0-2 suggests the possibility of dementia, score of 3-5 suggests no dementia    Identified Health Risks:     The patient was provided with suggestions to help him develop a healthy lifestyle.   He is at risk for lack of exercise and has been provided with information to increase physical activity for the benefit of his well-being.  The patient was counseled and encouraged to consider modifying their diet and eating habits. He was provided with information on recommended healthy diet options.  He is at risk for falling and has been provided with information to reduce the risk of falling at home.  Patient's advanced directive was discussed

## 2021-06-20 NOTE — PROGRESS NOTES
ASSESSMENT and PLAN:  1. Slow transit constipation  -Patient presented for evaluation of constipation without fecal impaction.  Instructions given below to see if we can help with having a bowel movement.  Once he has a normal bowel movement he can go back to doing once daily MiraLAX.  We discussed letting me know if no improvement of symptoms and might possibly be able to make other recommendations such as lactulose however that is not likely to be any more effective than the recommended treatments.  We discussed that unfortunately we do not have enemas to give in clinic and if symptoms were to worsen recommended going to the emergency room or emergency room for evacuation.  No concerns with the rectal exam today and I do not find the need to do any imaging now at this time since no concern for bowel obstruction    Patient and wife were in agreement to the plan    Patient Instructions   pericolace- senna with docusate- increase to 1 capsule twice daily , may temporarily go up to 2 tablets twice daily if needed      Increase your miralax to twice daily until you have normal movement.     Glycerin suppository is safe to try twice daily.  Sometimes an enema is more effective     Increase fluid levels while doing the miralax.     And if still no success and increasing abdominal pain or nausea vomiting, fever should go to ER.     Call dr saha if no improvement within a couple days       No orders of the defined types were placed in this encounter.    Medications Discontinued During This Encounter   Medication Reason     polyethylene glycol (MIRALAX) 17 gram packet        No Follow-up on file.    DATA REVIEWED:  Additional History from Old Records Summarized (2): reviewed highness last note regarding constipation. Reviewed last colonoscopy 2007  Decision to Obtain Records (1):    Radiology Tests Summarized or Ordered (1):    Labs Reviewed or Ordered (1): reviewed recent labs and electrolytes  Medicine Test Summarized  "or Ordered (1):    Independent Review of EKG, X-RAY, or RAPID STREP (2 each):      The visit lasted a total of 25 minutes face to face with the patient. Over 50% of the time was spent counseling and educating the patient     CHIEF COMPLAINT:  Chief Complaint   Patient presents with     Constipation     Having trouble with constipation for about 2 weeks.  Has tried, laxitives, stool softeners and suppositories.   Has been on vacation all last week.  Has small infrequent BMs.        HISTORY OF PRESENT ILLNESS:  Balwinder Huang is a 87 y.o. male presents with his wife for evaluation of constipation.   According to office notes has had issues with constipation before but pt notes:   \"Constipation has never been this bad\". Last Couple weeks we were on vacation- feel like want to go, hurts and want to go. Pushes and cant. Tried stool softener, ex lax, and now using suppositories. Works sometimes. Goes a little but not a lot. Still has urge. Last time normal BM 2 weeks ago. Was up in [place] on vacation- no changes to medications or types of food he eats.  no blood or dark black stool. Does 5 glasses water a day.   2 years on oxybutynin  -always uses once daily miralax daily. When this began, started pericolace once daily , glycerin suppositories once day.  Sometimes twice. Tried 1 enema other night- wasn't able to hold long- only water seeped out.   Colonoscopy - 2007 -1 polyp. Never had follow up  Appetite poor since this happening  Weight down slightly.  But prior no issues with concerning weight loss, nausea vomiting fever or other systemic sx. Some abdominal cramping because distended. Only abdominal surgery hx was appendix    REVIEW OF SYSTEMS:    Comprehensive review of systems is negative except as noted in the HPI.     PFSH:  Tobacco use and medications reviewed below.     TOBACCO USE:  History   Smoking Status     Never Smoker   Smokeless Tobacco     Never Used       VITALS:  Vitals:    10/01/18 1409 10/01/18 " 1424   BP: 95/45 119/55   Pulse: 60    Resp: (!) 32    Temp: 97  F (36.1  C)    TempSrc: Oral    SpO2: 92%    Weight: 177 lb 9.6 oz (80.6 kg)      Wt Readings from Last 3 Encounters:   10/01/18 177 lb 9.6 oz (80.6 kg)   08/03/18 180 lb (81.6 kg)   05/05/17 188 lb 12.8 oz (85.6 kg)       PHYSICAL EXAM:  Constitutional:  Reveals a 87 y.o. male with walker. Slightly frail, mildly uncomfortable  Vitals:  Per nursing notes.  Neck:  Supple, thyroid not palpable.  Cardiac:  Regular rate and rhythm without murmurs, rubs, or gallops. Carotids without bruits. Legs without edema. Palpation of the radial pulse regular.  Lungs: Clear.  Respiratory effort normal.  Skin:   Without rash, bruise, or palpable lesions.  Rheumatologic: Normal joints and nails of the hands.  Neurologic:  Cranial nerves II-XII intact.     Psychiatric:  Mood appropriate, memory intact.   Abdomen: mildly distended, normal BS, tender to palpation LLQ   Rectal exam-normal rectal tone. No palpable stool in rectal vault.          MEDICATIONS:  Current Outpatient Prescriptions   Medication Sig Dispense Refill     aspirin 325 MG tablet Take 1 tablet (325 mg total) by mouth daily. Must take for 30 days for dvt prophylaxis. Take with meals  0     atenolol (TENORMIN) 25 MG tablet Take 1 tablet (25 mg total) by mouth daily. 90 tablet 3     clopidogrel (PLAVIX) 75 mg tablet Take 1 tablet (75 mg total) by mouth daily. 90 tablet 3     multivitamin therapeutic (THERAGRAN) tablet Take 1 tablet by mouth daily.       oxybutynin (DITROPAN XL) 10 MG ER tablet Take 10 mg by mouth daily.       simvastatin (ZOCOR) 20 MG tablet Take 1 tablet (20 mg total) by mouth at bedtime. 90 tablet 3     tamsulosin (FLOMAX) 0.4 mg Cp24 Take 2 capsules (0.8 mg total) by mouth Daily after breakfast. 180 capsule 3     vit C/vit E/lutein/min/omega-3 (OCUVITE ORAL) Take 1 tablet by mouth daily.       polyethylene glycol (GLYCOLAX) 17 gram/dose powder Take 17 g by mouth 2 (two) times a day as  needed. 510 g 11     senna-docusate (PERICOLACE) 8.6-50 mg tablet Take 1 tablet by mouth 2 (two) times a day. 30 tablet 1     No current facility-administered medications for this visit.

## 2021-06-21 NOTE — LETTER
Letter by Miguel Day MD at      Author: Miguel Day MD Service: -- Author Type: --    Filed:  Encounter Date: 1/7/2021 Status: (Other)         Balwinder Huang  [facility]   [place] [address]             January 7, 2021         Dear Mr. Huang,    Below are the results from your recent visit:    Recent Results (from the past 672 hour(s))   HM2(CBC w/o Differential)    Collection Time: 12/16/20 10:26 AM   Result Value Ref Range    WBC 8.6 4.0 - 11.0 thou/uL    RBC 3.84 (L) 4.40 - 6.20 mill/uL    Hemoglobin 13.2 (L) 14.0 - 18.0 g/dL    Hematocrit 38.3 (L) 40.0 - 54.0 %     80 - 100 fL    MCH 34.4 (H) 27.0 - 34.0 pg    MCHC 34.5 32.0 - 36.0 g/dL    RDW 11.8 11.0 - 14.5 %    Platelets 176 140 - 440 thou/uL    MPV 7.9 7.0 - 10.0 fL   Lipid Cascade    Collection Time: 12/16/20 10:26 AM   Result Value Ref Range    Cholesterol 127 <=199 mg/dL    Triglycerides 103 <=149 mg/dL    HDL Cholesterol 39 (L) >=40 mg/dL    LDL Calculated 67 <=129 mg/dL    Patient Fasting > 8hrs? Yes    Comprehensive Metabolic Panel    Collection Time: 12/16/20 10:26 AM   Result Value Ref Range    Sodium 141 136 - 145 mmol/L    Potassium 4.7 3.5 - 5.0 mmol/L    Chloride 105 98 - 107 mmol/L    CO2 27 22 - 31 mmol/L    Anion Gap, Calculation 9 5 - 18 mmol/L    Glucose 104 70 - 125 mg/dL    BUN 21 8 - 28 mg/dL    Creatinine 0.88 0.70 - 1.30 mg/dL    GFR MDRD Af Amer >60 >60 mL/min/1.73m2    GFR MDRD Non Af Amer >60 >60 mL/min/1.73m2    Bilirubin, Total 0.5 0.0 - 1.0 mg/dL    Calcium 8.9 8.5 - 10.5 mg/dL    Protein, Total 6.5 6.0 - 8.0 g/dL    Albumin 3.8 3.5 - 5.0 g/dL    Alkaline Phosphatase 68 45 - 120 U/L    AST 21 0 - 40 U/L    ALT 16 0 - 45 U/L   BNP(B-type Natriuretic Peptide)    Collection Time: 12/16/20 10:26 AM   Result Value Ref Range     (H) 0 - 93 pg/mL         Your cholesterol levels are at goal range.  Kidney function and liver function are normal.  Electrolytes are at goal range.  No signs of  diabetes.  BNP level, the heart test that we spoke about the other day, is slightly elevated but similar to what it was a few years ago.  If there is any worsening swelling in the lower legs or difficulty with breathing or increase in the cough that we discussed please contact me at clinic and we can start a water pill.  No other concerns on blood work.    Please call with questions or contact us using [employer].    Sincerely,        Electronically signed by Miguel Day MD

## 2021-06-26 ENCOUNTER — HEALTH MAINTENANCE LETTER (OUTPATIENT)
Age: 86
End: 2021-06-26

## 2021-07-27 ENCOUNTER — OFFICE VISIT (OUTPATIENT)
Dept: FAMILY MEDICINE | Facility: CLINIC | Age: 86
End: 2021-07-27
Payer: COMMERCIAL

## 2021-07-27 VITALS
DIASTOLIC BLOOD PRESSURE: 48 MMHG | BODY MASS INDEX: 28.9 KG/M2 | OXYGEN SATURATION: 94 % | SYSTOLIC BLOOD PRESSURE: 113 MMHG | HEART RATE: 59 BPM | WEIGHT: 171 LBS | RESPIRATION RATE: 28 BRPM

## 2021-07-27 DIAGNOSIS — K59.00 CONSTIPATION, UNSPECIFIED CONSTIPATION TYPE: Primary | ICD-10-CM

## 2021-07-27 PROCEDURE — 99214 OFFICE O/P EST MOD 30 MIN: CPT | Performed by: FAMILY MEDICINE

## 2021-07-27 NOTE — PATIENT INSTRUCTIONS
CONSTIPATION:    Sit on toilet for 2minutes after breakfast, lunch, and dinner to pass stool/urine.  Drink at least 1 liter (quart) of water daily.  Mix 1 capful miralax in 6-8 oz fluid and drink twice daily    If things are getting worse then go the the ER where an enema could be done.

## 2021-07-27 NOTE — PROGRESS NOTES
DIAGNOSIS:  Encounter Diagnosis   Name Primary?     Constipation, unspecified constipation type Yes        PLAN:         Sit on toilet for 2minutes after breakfast, lunch, and dinner to pass stool/urine.  Drink at least 1 liter (quart) of water daily.  Mix 1 capful miralax in 6-8 oz fluid and drink twice daily    If things are getting worse then go the the ER where an enema could be done.              HPI:   Feels like he needs to have a BM but is unable to have a bm.  Forced some stool out yesterday.  Used a suppository yesterday.  Has to then push out the stool.  Has been difficult for 2-3 weeks.  Has tried miralax and metamucil and stool softener.   Ditropan has helped decrease his urine frequency.  The urine flow is otherwise ok.             Current Outpatient Medications   Medication     aspirin 81 MG EC tablet     atenoloL (TENORMIN) 25 MG tablet     clopidogreL (PLAVIX) 75 mg tablet     glucosamine sulfate 500 mg Tab     multivitamin therapeutic (THERAGRAN) tablet     oxybutynin (DITROPAN XL) 15 MG 24 hr tablet     simvastatin (ZOCOR) 20 MG tablet     tamsulosin (FLOMAX) 0.4 mg cap     vit C/vit E/lutein/min/omega-3 (OCUVITE ORAL)     No current facility-administered medications for this visit.       Pmh: reviewed  Psh: reviewed  Allergy:  reviewed      EXAM:    /48 (BP Location: Left arm, Patient Position: Sitting, Cuff Size: Adult Regular)   Pulse 59   Resp 28   Wt 77.6 kg (171 lb)   SpO2 94%   BMI 28.90 kg/m    GEN:   ALERT, NAD, ORIENTED TIMES THREE  LUNGS: CTA  COR: RRR WITHOUT MURMUR  ABD: SOFT, +BS, NONTX WITHOUT GUARDING OR PERITONEAL SIGNS  :  VERY LARGE PROSTATE BUT OTHERWISE NO RECTAL MASS OR STRICTURE. STOOL NOT PALPABLE.   EXT: WITHOUT EDEMA/SWELLING      abd xray with large amount of stool.

## 2021-08-11 ENCOUNTER — TRANSFERRED RECORDS (OUTPATIENT)
Dept: HEALTH INFORMATION MANAGEMENT | Facility: CLINIC | Age: 86
End: 2021-08-11

## 2021-11-01 ENCOUNTER — TRANSFERRED RECORDS (OUTPATIENT)
Dept: HEALTH INFORMATION MANAGEMENT | Facility: CLINIC | Age: 86
End: 2021-11-01
Payer: COMMERCIAL

## 2021-11-30 ENCOUNTER — TRANSFERRED RECORDS (OUTPATIENT)
Dept: HEALTH INFORMATION MANAGEMENT | Facility: CLINIC | Age: 86
End: 2021-11-30
Payer: COMMERCIAL

## 2021-12-02 DIAGNOSIS — M17.12 PRIMARY OSTEOARTHRITIS OF LEFT KNEE: ICD-10-CM

## 2021-12-05 RX ORDER — TAMSULOSIN HYDROCHLORIDE 0.4 MG/1
CAPSULE ORAL
Qty: 180 CAPSULE | Refills: 2 | Status: SHIPPED | OUTPATIENT
Start: 2021-12-05 | End: 2024-01-30

## 2021-12-05 NOTE — TELEPHONE ENCOUNTER
"Last Written Prescription Date:  12/16/20  Last Fill Quantity: 180,  # refills: 3   Last office visit provider:  7/27/21     Requested Prescriptions   Pending Prescriptions Disp Refills     tamsulosin (FLOMAX) 0.4 MG capsule [Pharmacy Med Name: TAMSULOSIN 0.4MG CAPSULES] 180 capsule 3     Sig: TAKE 2 CAPSULES(0.8 MG) BY MOUTH DAILY AFTER BREAKFAST       Alpha Blockers Passed - 12/2/2021  2:29 PM        Passed - Blood pressure under 140/90 in past 12 months     BP Readings from Last 3 Encounters:   07/27/21 113/48   12/16/20 135/65   06/29/20 112/44                 Passed - Recent (12 mo) or future (30 days) visit within the authorizing provider's specialty     Patient has had an office visit with the authorizing provider or a provider within the authorizing providers department within the previous 12 mos or has a future within next 30 days. See \"Patient Info\" tab in inbasket, or \"Choose Columns\" in Meds & Orders section of the refill encounter.              Passed - Patient does not have Tadalafil, Vardenafil, or Sildenafil on their medication list        Passed - Medication is active on med list        Passed - Patient is 18 years of age or older             Joshua Quijano RN 12/05/21 10:37 AM  "

## 2022-01-04 ENCOUNTER — TRANSFERRED RECORDS (OUTPATIENT)
Dept: HEALTH INFORMATION MANAGEMENT | Facility: CLINIC | Age: 87
End: 2022-01-04
Payer: COMMERCIAL

## 2022-01-18 ENCOUNTER — OFFICE VISIT (OUTPATIENT)
Dept: FAMILY MEDICINE | Facility: CLINIC | Age: 87
End: 2022-01-18
Payer: COMMERCIAL

## 2022-01-18 ENCOUNTER — MEDICAL CORRESPONDENCE (OUTPATIENT)
Dept: HEALTH INFORMATION MANAGEMENT | Facility: CLINIC | Age: 87
End: 2022-01-18

## 2022-01-18 VITALS
HEIGHT: 65 IN | SYSTOLIC BLOOD PRESSURE: 111 MMHG | HEART RATE: 81 BPM | WEIGHT: 166 LBS | DIASTOLIC BLOOD PRESSURE: 46 MMHG | BODY MASS INDEX: 27.66 KG/M2 | TEMPERATURE: 96.6 F

## 2022-01-18 DIAGNOSIS — I10 ESSENTIAL HYPERTENSION: ICD-10-CM

## 2022-01-18 DIAGNOSIS — Z00.00 ENCOUNTER FOR MEDICARE ANNUAL WELLNESS EXAM: Primary | ICD-10-CM

## 2022-01-18 DIAGNOSIS — D53.9 MACROCYTIC ANEMIA: ICD-10-CM

## 2022-01-18 DIAGNOSIS — I25.10 CORONARY ARTERY DISEASE INVOLVING NATIVE HEART WITHOUT ANGINA PECTORIS, UNSPECIFIED VESSEL OR LESION TYPE: ICD-10-CM

## 2022-01-18 DIAGNOSIS — Z23 NEED FOR TETANUS BOOSTER: ICD-10-CM

## 2022-01-18 LAB
ALBUMIN SERPL-MCNC: 3.4 G/DL (ref 3.5–5)
ALP SERPL-CCNC: 68 U/L (ref 45–120)
ALT SERPL W P-5'-P-CCNC: 17 U/L (ref 0–45)
ANION GAP SERPL CALCULATED.3IONS-SCNC: 12 MMOL/L (ref 5–18)
AST SERPL W P-5'-P-CCNC: 21 U/L (ref 0–40)
BILIRUB SERPL-MCNC: 0.6 MG/DL (ref 0–1)
BUN SERPL-MCNC: 24 MG/DL (ref 8–28)
CALCIUM SERPL-MCNC: 8.9 MG/DL (ref 8.5–10.5)
CHLORIDE BLD-SCNC: 108 MMOL/L (ref 98–107)
CHOLEST SERPL-MCNC: 136 MG/DL
CO2 SERPL-SCNC: 23 MMOL/L (ref 22–31)
CREAT SERPL-MCNC: 0.88 MG/DL (ref 0.7–1.3)
ERYTHROCYTE [DISTWIDTH] IN BLOOD BY AUTOMATED COUNT: 12.9 % (ref 10–15)
FASTING STATUS PATIENT QL REPORTED: YES
GFR SERPL CREATININE-BSD FRML MDRD: 82 ML/MIN/1.73M2
GLUCOSE BLD-MCNC: 101 MG/DL (ref 70–125)
HCT VFR BLD AUTO: 37.9 % (ref 40–53)
HDLC SERPL-MCNC: 40 MG/DL
HGB BLD-MCNC: 12.5 G/DL (ref 13.3–17.7)
LDLC SERPL CALC-MCNC: 80 MG/DL
MCH RBC QN AUTO: 33.5 PG (ref 26.5–33)
MCHC RBC AUTO-ENTMCNC: 33 G/DL (ref 31.5–36.5)
MCV RBC AUTO: 102 FL (ref 78–100)
PLATELET # BLD AUTO: 214 10E3/UL (ref 150–450)
POTASSIUM BLD-SCNC: 4.3 MMOL/L (ref 3.5–5)
PROT SERPL-MCNC: 6.5 G/DL (ref 6–8)
RBC # BLD AUTO: 3.73 10E6/UL (ref 4.4–5.9)
SODIUM SERPL-SCNC: 143 MMOL/L (ref 136–145)
TRIGL SERPL-MCNC: 78 MG/DL
WBC # BLD AUTO: 9.7 10E3/UL (ref 4–11)

## 2022-01-18 PROCEDURE — 90471 IMMUNIZATION ADMIN: CPT | Performed by: FAMILY MEDICINE

## 2022-01-18 PROCEDURE — 99213 OFFICE O/P EST LOW 20 MIN: CPT | Mod: 25 | Performed by: FAMILY MEDICINE

## 2022-01-18 PROCEDURE — 36415 COLL VENOUS BLD VENIPUNCTURE: CPT | Performed by: FAMILY MEDICINE

## 2022-01-18 PROCEDURE — 80061 LIPID PANEL: CPT | Performed by: FAMILY MEDICINE

## 2022-01-18 PROCEDURE — 99397 PER PM REEVAL EST PAT 65+ YR: CPT | Mod: 25 | Performed by: FAMILY MEDICINE

## 2022-01-18 PROCEDURE — 85027 COMPLETE CBC AUTOMATED: CPT | Performed by: FAMILY MEDICINE

## 2022-01-18 PROCEDURE — 80053 COMPREHEN METABOLIC PANEL: CPT | Performed by: FAMILY MEDICINE

## 2022-01-18 PROCEDURE — 90714 TD VACC NO PRESV 7 YRS+ IM: CPT | Performed by: FAMILY MEDICINE

## 2022-01-18 ASSESSMENT — MIFFLIN-ST. JEOR: SCORE: 1331.91

## 2022-01-18 ASSESSMENT — ACTIVITIES OF DAILY LIVING (ADL): CURRENT_FUNCTION: TRANSPORTATION REQUIRES ASSISTANCE

## 2022-01-18 NOTE — PROGRESS NOTES
"SUBJECTIVE:   Balwinder Huang is a 89 year old male who presents for Preventive Visit.    Patient has been advised of split billing requirements and indicates understanding: Yes   Are you in the first 12 months of your Medicare coverage?  No    Healthy Habits:     In general, how would you rate your overall health?  Fair    Frequency of exercise:  2-3 days/week    Duration of exercise:  Less than 15 minutes    Do you usually eat at least 4 servings of fruit and vegetables a day, include whole grains    & fiber and avoid regularly eating high fat or \"junk\" foods?  No    Taking medications regularly:  Yes    Medication side effects:  None    Ability to successfully perform activities of daily living:  Transportation requires assistance    Home Safety:  No safety concerns identified    Hearing Impairment:  No hearing concerns    In the past 6 months, have you been bothered by leaking of urine?  No    In general, how would you rate your overall mental or emotional health?  Fair      PHQ-2 Total Score: 1    Additional concerns today:  No    Do you feel safe in your environment? Yes    Have you ever done Advance Care Planning? (For example, a Health Directive, POLST, or a discussion with a medical provider or your loved ones about your wishes): Yes, advance care planning is on file.       Fall risk  Fallen 2 or more times in the past year?: No  Any fall with injury in the past year?: No    Cognitive Screening   1) Repeat 3 items (Leader, Season, Table)    2) Clock draw: NORMAL  3) 3 item recall: Recalls 2 objects   Results: NORMAL clock, 1-2 items recalled: COGNITIVE IMPAIRMENT LESS LIKELY    Mini-CogTM Copyright JEFF Barros. Licensed by the author for use in [facility]; reprinted with permission (lacey@.[email or web address]). All rights reserved.          Reviewed and updated as needed this visit by clinical staff  Tobacco  Allergies  Meds             Reviewed and updated as needed this visit by Provider               Social " "History     Tobacco Use     Smoking status: Never Smoker     Smokeless tobacco: Never Used   Substance Use Topics     Alcohol use: No         Alcohol Use 1/18/2022   Prescreen: >3 drinks/day or >7 drinks/week? Not Applicable               Current providers sharing in care for this patient include:   Patient Care Team:  Miguel Day MD as PCP - General (Family Practice)  Joshua Gutierrez MD as Assigned PCP    The following health maintenance items are reviewed in Epic and correct as of today:  Health Maintenance Due   Topic Date Due     ANNUAL REVIEW OF HM ORDERS  Never done     DTAP/TDAP/TD IMMUNIZATION (1 - Tdap) Never done     MEDICARE ANNUAL WELLNESS VISIT  12/16/2021     FALL RISK ASSESSMENT  12/16/2021           Review of Systems  CONSTITUTIONAL: NEGATIVE for fever, chills, change in weight  INTEGUMENTARY/SKIN: NEGATIVE for worrisome rashes, moles or lesions  EYES: NEGATIVE for vision changes or irritation  ENT/MOUTH: NEGATIVE for ear, mouth and throat problems  RESP: NEGATIVE for significant cough or SOB  BREAST: NEGATIVE for masses, tenderness or discharge  CV: NEGATIVE for chest pain, palpitations or peripheral edema  GI:  Has feeling of a need for a BM but can't go except with the help of a glycerin suppository.         The stool is normal in occurrence.   : NEGATIVE for frequency, dysuria, or hematuria  MUSCULOSKELETAL: NEGATIVE for significant arthralgias or myalgia  NEURO: NEGATIVE for weakness, dizziness or paresthesias  ENDOCRINE: NEGATIVE for temperature intolerance, skin/hair changes  HEME: NEGATIVE for bleeding problems  PSYCHIATRIC: NEGATIVE for changes in mood or affect    Some neck pain  Up to void at hs.    OBJECTIVE:   /46   Pulse 81   Temp (!) 96.6  F (35.9  C) (Oral)   Ht 1.638 m (5' 4.5\")   Wt 75.3 kg (166 lb)   BMI 28.05 kg/m   Estimated body mass index is 28.05 kg/m  as calculated from the following:    Height as of this encounter: 1.638 m (5' 4.5\").    Weight as of " "this encounter: 75.3 kg (166 lb).   Wt Readings from Last 4 Encounters:   01/18/22 75.3 kg (166 lb)   07/27/21 77.6 kg (171 lb)   12/16/20 79.7 kg (175 lb 12.8 oz)   06/29/20 81.7 kg (180 lb 3.2 oz)     Physical Exam  GENERAL: healthy, alert and no distress  EYES: Eyes grossly normal to inspection, PERRL and conjunctivae and sclerae normal  HENT: ear canals and TM's normal, nose and mouth without ulcers or lesions  NECK: no adenopathy, no asymmetry, masses, or scars and thyroid normal to palpation  RESP: lungs clear to auscultation - no rales, rhonchi or wheezes  CV: regular rate and rhythm, normal S1 S2, no S3 or S4, no murmur, click or rub, no peripheral edema and peripheral pulses strong  ABDOMEN: soft, nontender, no hepatosplenomegaly, no masses and bowel sounds normal  RECTAL: deferred  MS: no gross musculoskeletal defects noted, no edema  SKIN: no suspicious lesions or rashes  NEURO: Normal strength and tone, mentation intact and speech normal  PSYCH: mentation appears normal, affect normal/bright      ASSESSMENT / PLAN:       ICD-10-CM    1. Encounter for Medicare annual wellness exam  Z00.00 Lipid Profile (Chol, Trig, HDL, LDL calc)     Comprehensive metabolic panel (BMP + Alb, Alk Phos, ALT, AST, Total. Bili, TP)     CBC with platelets   2. Essential hypertension, well controlled I10    3. Need for tetanus booster  Z23 TD PRSERV FREE >=7 YRS ADS IM       PLAN:   Fasting labs    Td (tetanus) booster     Patient has been advised of split billing requirements and indicates understanding: Yes  COUNSELING:  Reviewed preventive health counseling, as reflected in patient instructions       Regular exercise       Healthy diet/nutrition    Estimated body mass index is 28.05 kg/m  as calculated from the following:    Height as of this encounter: 1.638 m (5' 4.5\").    Weight as of this encounter: 75.3 kg (166 lb).        He reports that he has never smoked. He has never used smokeless tobacco.      Appropriate " preventive services were discussed with this patient, including applicable screening as appropriate for cardiovascular disease, diabetes, osteopenia/osteoporosis, and glaucoma.  As appropriate for age/gender, discussed screening for colorectal cancer, prostate cancer, breast cancer, and cervical cancer. Checklist reviewing preventive services available has been given to the patient.    Reviewed patients plan of care and provided an AVS. The Basic Care Plan (routine screening as documented in Health Maintenance) for Balwinder meets the Care Plan requirement. This Care Plan has been established and reviewed with the Patient.        Joshua Gutierrez MD  [facility]    Identified Health Risks:

## 2022-01-18 NOTE — LETTER
January 18, 2022      Balwinder Huang  [address]  [place] [address]        Dear ,  We are writing to inform you of your test results.    Hi Ed:  Your cholesterol panel is good.    The hemoglobin has fallen slightly.  I would like you to get a follow up lab draw for folate and B-12.  Please schedule a lab draw (non-fasting).  Sometime absorption of these vitamins can become inadequate, so let's see.    The remaining labs are normal.    Resulted Orders   Lipid Profile (Chol, Trig, HDL, LDL calc)   Result Value Ref Range    Cholesterol 136 <=199 mg/dL    Triglycerides 78 <=149 mg/dL    Direct Measure HDL 40 >=40 mg/dL      Comment:      HDL Cholesterol Reference Range:     0-2 years:   No reference ranges established for patients under 2 years old  at [facility] [employer] for lipid analytes.    2-8 years:  Greater than 45 mg/dL     18 years and older:   Female: Greater than or equal to 50 mg/dL   Male:   Greater than or equal to 40 mg/dL    LDL Cholesterol Calculated 80 <=129 mg/dL    Patient Fasting > 8hrs? Yes    Comprehensive metabolic panel (BMP + Alb, Alk Phos, ALT, AST, Total. Bili, TP)   Result Value Ref Range    Sodium 143 136 - 145 mmol/L    Potassium 4.3 3.5 - 5.0 mmol/L    Chloride 108 (H) 98 - 107 mmol/L    Carbon Dioxide (CO2) 23 22 - 31 mmol/L    Anion Gap 12 5 - 18 mmol/L    Urea Nitrogen 24 8 - 28 mg/dL    Creatinine 0.88 0.70 - 1.30 mg/dL    Calcium 8.9 8.5 - 10.5 mg/dL    Glucose 101 70 - 125 mg/dL    Alkaline Phosphatase 68 45 - 120 U/L    AST 21 0 - 40 U/L    ALT 17 0 - 45 U/L    Protein Total 6.5 6.0 - 8.0 g/dL    Albumin 3.4 (L) 3.5 - 5.0 g/dL    Bilirubin Total 0.6 0.0 - 1.0 mg/dL    GFR Estimate 82 >60 mL/min/1.73m2      Comment:      Effective December 21, 2021 eGFRcr in adults is calculated using the 2021 CKD-EPI creatinine equation which includes age and gender (Luis chen al., NEJM, DOI: 10.1056/CIJHyu5221119)   CBC with platelets   Result Value Ref Range    WBC Count  9.7 4.0 - 11.0 10e3/uL    RBC Count 3.73 (L) 4.40 - 5.90 10e6/uL    Hemoglobin 12.5 (L) 13.3 - 17.7 g/dL    Hematocrit 37.9 (L) 40.0 - 53.0 %     (H) 78 - 100 fL    MCH 33.5 (H) 26.5 - 33.0 pg    MCHC 33.0 31.5 - 36.5 g/dL    RDW 12.9 10.0 - 15.0 %    Platelet Count 214 150 - 450 10e3/uL       If you have any questions or concerns, please call the clinic at the number listed above.       Sincerely,      Joshua Gutierrez MD

## 2022-01-20 ENCOUNTER — LAB (OUTPATIENT)
Dept: LAB | Facility: CLINIC | Age: 87
End: 2022-01-20
Payer: COMMERCIAL

## 2022-01-20 DIAGNOSIS — D53.9 MACROCYTIC ANEMIA: ICD-10-CM

## 2022-01-20 LAB
FOLATE SERPL-MCNC: 17.7 NG/ML
VIT B12 SERPL-MCNC: 415 PG/ML (ref 213–816)

## 2022-01-20 PROCEDURE — 82607 VITAMIN B-12: CPT

## 2022-01-20 PROCEDURE — 82746 ASSAY OF FOLIC ACID SERUM: CPT

## 2022-01-20 PROCEDURE — 36415 COLL VENOUS BLD VENIPUNCTURE: CPT

## 2022-01-21 ENCOUNTER — TELEPHONE (OUTPATIENT)
Dept: FAMILY MEDICINE | Facility: CLINIC | Age: 87
End: 2022-01-21
Payer: COMMERCIAL

## 2022-01-21 DIAGNOSIS — I25.10 CARDIOVASCULAR DISEASE: ICD-10-CM

## 2022-01-21 DIAGNOSIS — E78.00 PURE HYPERCHOLESTEROLEMIA: ICD-10-CM

## 2022-01-21 DIAGNOSIS — I10 ESSENTIAL HYPERTENSION: ICD-10-CM

## 2022-01-21 NOTE — TELEPHONE ENCOUNTER
----- Message from Joshua Gutierrez MD sent at 1/20/2022  9:22 PM CST -----  Hi Ed:  These follow up vitamin levels were normal.  Please follow up in clinic in a month to recheck your hemoglobin.

## 2022-01-23 RX ORDER — ATENOLOL 25 MG/1
TABLET ORAL
Qty: 90 TABLET | Refills: 3 | Status: SHIPPED | OUTPATIENT
Start: 2022-01-23 | End: 2023-01-20

## 2022-01-23 RX ORDER — SIMVASTATIN 20 MG
TABLET ORAL
Qty: 90 TABLET | Refills: 3 | Status: SHIPPED | OUTPATIENT
Start: 2022-01-23 | End: 2023-04-17

## 2022-01-24 RX ORDER — CLOPIDOGREL BISULFATE 75 MG/1
TABLET ORAL
Qty: 90 TABLET | Refills: 0 | Status: SHIPPED | OUTPATIENT
Start: 2022-01-24 | End: 2022-04-24

## 2022-01-24 NOTE — TELEPHONE ENCOUNTER
"Last Written Prescription Date:  4/26/2021  Last Fill Quantity: 90,  # refills: 2   Last office visit provider:  1/18/2022     Requested Prescriptions   Pending Prescriptions Disp Refills     simvastatin (ZOCOR) 20 MG tablet [Pharmacy Med Name: SIMVASTATIN 20MG TABLETS] 90 tablet 2     Sig: TAKE 1 TABLET(20 MG) BY MOUTH AT BEDTIME       Statins Protocol Passed - 1/21/2022  5:42 AM        Passed - LDL on file in past 12 months     Recent Labs   Lab Test 01/18/22  1017   LDL 80             Passed - No abnormal creatine kinase in past 12 months     No lab results found.             Passed - Recent (12 mo) or future (30 days) visit within the authorizing provider's specialty     Patient has had an office visit with the authorizing provider or a provider within the authorizing providers department within the previous 12 mos or has a future within next 30 days. See \"Patient Info\" tab in inbasket, or \"Choose Columns\" in Meds & Orders section of the refill encounter.              Passed - Medication is active on med list        Passed - Patient is age 18 or older        Last Written Prescription Date:  4/26/2021  Last Fill Quantity: 90,  # refills: 2        atenolol (TENORMIN) 25 MG tablet [Pharmacy Med Name: ATENOLOL 25MG TABLETS] 90 tablet 2     Sig: TAKE 1 TABLET(25 MG) BY MOUTH DAILY       Beta-Blockers Protocol Passed - 1/21/2022  5:42 AM        Passed - Blood pressure under 140/90 in past 12 months     BP Readings from Last 3 Encounters:   01/18/22 111/46   07/27/21 113/48   12/16/20 135/65                 Passed - Patient is age 6 or older        Passed - Recent (12 mo) or future (30 days) visit within the authorizing provider's specialty     Patient has had an office visit with the authorizing provider or a provider within the authorizing providers department within the previous 12 mos or has a future within next 30 days. See \"Patient Info\" tab in inbasket, or \"Choose Columns\" in Meds & Orders section of the refill " "encounter.              Passed - Medication is active on med list        Routing refill request to provider for review/approval because:  Labs out of range:  HGB    Last Written Prescription Date:  10/25/2021  Last Fill Quantity: 90,  # refills: 0        clopidogrel (PLAVIX) 75 MG tablet [Pharmacy Med Name: CLOPIDOGREL 75MG TABLETS] 90 tablet 0     Sig: TAKE 1 TABLET(75 MG) BY MOUTH DAILY       Plavix Failed - 1/21/2022  5:42 AM        Failed - Normal HGB on file in past 12 months     Recent Labs   Lab Test 01/18/22  1017   HGB 12.5*               Passed - No active PPI on record unless is Protonix        Passed - Normal Platelets on file in past 12 months     Recent Labs   Lab Test 01/18/22  1017                  Passed - Recent (12 mo) or future (30 days) visit within the authorizing provider's specialty     Patient has had an office visit with the authorizing provider or a provider within the authorizing providers department within the previous 12 mos or has a future within next 30 days. See \"Patient Info\" tab in inbasket, or \"Choose Columns\" in Meds & Orders section of the refill encounter.              Passed - Medication is active on med list        Passed - Patient is age 18 or older             Taisha Ramirez RN 01/23/22 7:46 PM  "

## 2022-03-01 ENCOUNTER — TELEPHONE (OUTPATIENT)
Dept: LAB | Facility: CLINIC | Age: 87
End: 2022-03-01
Payer: COMMERCIAL

## 2022-03-01 NOTE — PROGRESS NOTES
Patient coming in tomorrow for Hgb recheck. Last check was 1/18/2022 and was 12.5. Please review chart and place order if needed, thanks!

## 2022-03-02 ENCOUNTER — LAB (OUTPATIENT)
Dept: LAB | Facility: CLINIC | Age: 87
End: 2022-03-02

## 2022-03-02 DIAGNOSIS — D64.9 ANEMIA, UNSPECIFIED TYPE: ICD-10-CM

## 2022-03-02 DIAGNOSIS — D64.9 ANEMIA, UNSPECIFIED TYPE: Primary | ICD-10-CM

## 2022-03-02 LAB
ERYTHROCYTE [DISTWIDTH] IN BLOOD BY AUTOMATED COUNT: 12.9 % (ref 10–15)
HCT VFR BLD AUTO: 40.9 % (ref 40–53)
HGB BLD-MCNC: 13.5 G/DL (ref 13.3–17.7)
MCH RBC QN AUTO: 33.8 PG (ref 26.5–33)
MCHC RBC AUTO-ENTMCNC: 33 G/DL (ref 31.5–36.5)
MCV RBC AUTO: 102 FL (ref 78–100)
PLATELET # BLD AUTO: 213 10E3/UL (ref 150–450)
RBC # BLD AUTO: 4 10E6/UL (ref 4.4–5.9)
WBC # BLD AUTO: 10 10E3/UL (ref 4–11)

## 2022-03-02 PROCEDURE — 85027 COMPLETE CBC AUTOMATED: CPT

## 2022-03-02 PROCEDURE — 36415 COLL VENOUS BLD VENIPUNCTURE: CPT

## 2022-04-11 ENCOUNTER — OFFICE VISIT (OUTPATIENT)
Dept: FAMILY MEDICINE | Facility: CLINIC | Age: 87
End: 2022-04-11
Payer: COMMERCIAL

## 2022-04-11 VITALS
TEMPERATURE: 98.1 F | HEART RATE: 64 BPM | RESPIRATION RATE: 20 BRPM | WEIGHT: 170.13 LBS | HEIGHT: 65 IN | DIASTOLIC BLOOD PRESSURE: 60 MMHG | SYSTOLIC BLOOD PRESSURE: 136 MMHG | BODY MASS INDEX: 28.35 KG/M2

## 2022-04-11 DIAGNOSIS — H00.012 HORDEOLUM EXTERNUM OF RIGHT LOWER EYELID: Primary | ICD-10-CM

## 2022-04-11 PROCEDURE — 99213 OFFICE O/P EST LOW 20 MIN: CPT | Performed by: FAMILY MEDICINE

## 2022-04-12 NOTE — PROGRESS NOTES
"Problem List Items Addressed This Visit    None     Visit Diagnoses     Hordeolum externum of right lower eyelid    -  Primary          The patient has a stye involving his lateral aspect of his right lower eyelid.  We discussed the diagnosis and treatment approach.  We discussed warm packing with a wet cloth for 5 to 10 minutes 4-5 times per day until the stye resolves.  I would expect that to occur within a week or two and if it has not improved at that point, a consultation with an ophthalmologist would be the next step.  The patient was comfortable with that plan.    OLIVIA Britt is a 89 year old who presents today with a concern regarding a painful and swollen right eyelid.  The patient reports onset of symptoms about 4 or 5 days ago.  He initially thought it was a stye and did do some warm packing.  He has had some pussy drainage from it at times.  The lower eyelid is swollen, red and is quite tender.  He has not had any associated fever or vision difficulty.     Objective    /60 (BP Location: Left arm, Patient Position: Sitting, Cuff Size: Adult Regular)   Pulse 64   Temp 98.1  F (36.7  C) (Oral)   Resp 20   Ht 1.638 m (5' 4.5\")   Wt 77.2 kg (170 lb 2 oz)   BMI 28.75 kg/m    Body mass index is 28.75 kg/m .  Physical Exam   GENERAL: healthy, alert and no distress  EYE, RIGHT: mild to moderate erythema of the lower eyelid, most pronounced laterally. Crusting around eye is noted. Quite tender to palpation over the lateral aspect of the lower eyelid and there is a prominent palpable nodule in eyelid            Answers for HPI/ROS submitted by the patient on 4/11/2022  How many servings of fruits and vegetables do you eat daily?: 2-3  On average, how many sweetened beverages do you drink each day (Examples: soda, juice, sweet tea, etc.  Do NOT count diet or artificially sweetened beverages)?: 1  How many minutes a day do you exercise enough to make your heart beat faster?: " 10 to 19  How many days a week do you exercise enough to make your heart beat faster?: 3 or less  How many days per week do you miss taking your medication?: 0  What is the reason for your visit today?: Right eye

## 2022-04-21 DIAGNOSIS — I25.10 CARDIOVASCULAR DISEASE: ICD-10-CM

## 2022-04-24 NOTE — TELEPHONE ENCOUNTER
"Routing refill request to provider for review/approval because:  RN unable to renew blood thinners.        Last Written Prescription Date:  1/24/22  Last Fill Quantity: 90,  # refills: 0   Last office visit provider:  4/11/22    Requested Prescriptions   Pending Prescriptions Disp Refills     clopidogrel (PLAVIX) 75 MG tablet [Pharmacy Med Name: CLOPIDOGREL 75MG TABLETS] 90 tablet 0     Sig: TAKE 1 TABLET(75 MG) BY MOUTH DAILY       Plavix Passed - 4/21/2022  8:40 AM        Passed - No active PPI on record unless is Protonix        Passed - Normal HGB on file in past 12 months     Recent Labs   Lab Test 03/02/22  1148   HGB 13.5               Passed - Normal Platelets on file in past 12 months     Recent Labs   Lab Test 03/02/22  1148                  Passed - Recent (12 mo) or future (30 days) visit within the authorizing provider's specialty     Patient has had an office visit with the authorizing provider or a provider within the authorizing providers department within the previous 12 mos or has a future within next 30 days. See \"Patient Info\" tab in inbasket, or \"Choose Columns\" in Meds & Orders section of the refill encounter.              Passed - Medication is active on med list        Passed - Patient is age 18 or older             Carol Stanley RN 04/24/22 11:32 AM  "

## 2022-04-25 RX ORDER — CLOPIDOGREL BISULFATE 75 MG/1
TABLET ORAL
Qty: 90 TABLET | Refills: 0 | Status: SHIPPED | OUTPATIENT
Start: 2022-04-25 | End: 2022-10-07

## 2022-06-02 DIAGNOSIS — M17.12 PRIMARY OSTEOARTHRITIS OF LEFT KNEE: ICD-10-CM

## 2022-06-03 RX ORDER — TAMSULOSIN HYDROCHLORIDE 0.4 MG/1
CAPSULE ORAL
Qty: 180 CAPSULE | Refills: 2 | OUTPATIENT
Start: 2022-06-03

## 2022-09-20 ENCOUNTER — TRANSFERRED RECORDS (OUTPATIENT)
Dept: HEALTH INFORMATION MANAGEMENT | Facility: CLINIC | Age: 87
End: 2022-09-20

## 2022-09-25 ENCOUNTER — HEALTH MAINTENANCE LETTER (OUTPATIENT)
Age: 87
End: 2022-09-25

## 2022-10-04 ENCOUNTER — OFFICE VISIT (OUTPATIENT)
Dept: FAMILY MEDICINE | Facility: CLINIC | Age: 87
End: 2022-10-04
Payer: COMMERCIAL

## 2022-10-04 ENCOUNTER — HOSPITAL ENCOUNTER (OUTPATIENT)
Dept: CT IMAGING | Facility: HOSPITAL | Age: 87
Discharge: HOME | End: 2022-10-04
Attending: PHYSICIAN ASSISTANT | Admitting: PHYSICIAN ASSISTANT
Payer: COMMERCIAL

## 2022-10-04 VITALS
TEMPERATURE: 97.7 F | BODY MASS INDEX: 26.03 KG/M2 | HEART RATE: 60 BPM | RESPIRATION RATE: 24 BRPM | DIASTOLIC BLOOD PRESSURE: 67 MMHG | OXYGEN SATURATION: 94 % | SYSTOLIC BLOOD PRESSURE: 134 MMHG | WEIGHT: 154 LBS

## 2022-10-04 DIAGNOSIS — R35.0 URINARY FREQUENCY: ICD-10-CM

## 2022-10-04 DIAGNOSIS — R10.32 LLQ ABDOMINAL PAIN: Primary | ICD-10-CM

## 2022-10-04 DIAGNOSIS — R10.32 LLQ ABDOMINAL PAIN: ICD-10-CM

## 2022-10-04 DIAGNOSIS — N40.0 PROSTATE ENLARGEMENT: ICD-10-CM

## 2022-10-04 LAB
ALBUMIN UR-MCNC: NEGATIVE MG/DL
APPEARANCE UR: CLEAR
BILIRUB UR QL STRIP: NEGATIVE
COLOR UR AUTO: YELLOW
CREAT BLD-MCNC: 1 MG/DL (ref 0.7–1.3)
GFR SERPL CREATININE-BSD FRML MDRD: >60 ML/MIN/1.73M2
GLUCOSE UR STRIP-MCNC: NEGATIVE MG/DL
HGB UR QL STRIP: NEGATIVE
KETONES UR STRIP-MCNC: NEGATIVE MG/DL
LEUKOCYTE ESTERASE UR QL STRIP: NEGATIVE
NITRATE UR QL: NEGATIVE
PH UR STRIP: 6 [PH] (ref 5–8)
SP GR UR STRIP: 1.02 (ref 1–1.03)
UROBILINOGEN UR STRIP-ACNC: 0.2 E.U./DL

## 2022-10-04 PROCEDURE — 74177 CT ABD & PELVIS W/CONTRAST: CPT

## 2022-10-04 PROCEDURE — 255N000002 HC RX 255 OP 636: Performed by: PHYSICIAN ASSISTANT

## 2022-10-04 PROCEDURE — 81003 URINALYSIS AUTO W/O SCOPE: CPT

## 2022-10-04 PROCEDURE — 99215 OFFICE O/P EST HI 40 MIN: CPT | Performed by: PHYSICIAN ASSISTANT

## 2022-10-04 PROCEDURE — 82565 ASSAY OF CREATININE: CPT

## 2022-10-04 RX ORDER — NITROGLYCERIN 0.4 MG/1
TABLET SUBLINGUAL
COMMUNITY
Start: 2022-09-22 | End: 2024-01-30

## 2022-10-04 RX ADMIN — IOHEXOL 100 ML: 350 INJECTION, SOLUTION INTRAVENOUS at 14:57

## 2022-10-04 ASSESSMENT — ENCOUNTER SYMPTOMS
FEVER: 1
DIARRHEA: 0
ABDOMINAL PAIN: 1
CONSTIPATION: 0
HEMATURIA: 0
FREQUENCY: 1

## 2022-10-04 NOTE — PATIENT INSTRUCTIONS
Take Tylenol and or Ibuprofen as needed for pain control. Abdominal CT is looking overall good.   Follow up with Urology if urinary symptoms are persisting. No signs of infection of the bladder, kidney, or prostate infection.

## 2022-10-04 NOTE — PROGRESS NOTES
Patient presents with:  Urinary Problem: Urinary Frequency, Urgency, Discomfort with urination. Symptoms began on 10/03/22. Denies OTC medications, and fever.      Clinical Decision Making:  Patient experiencing irritative voiding symptoms along with left lower quadrant abdominal pain.  Differential diagnosis includes but is not limited to prostate enlargement, UTI, prostatitis, diverticulitis, bowel obstruction, and mesenteric ischemia.  Patient guards on left lower quadrant abdominal exam.  CT shows no signs of diverticulitis, obstruction, or prostatitis.  UA is negative for signs of UTI.  Suspect 2 separate issues.  Arthropathy of the left hip joint along with prostate enlargement.  Recommend Tylenol and ibuprofen.  Patient's GFR was normal today.  Patient will follow-up with urology if urinary frequency symptoms persist.  He is already on Flomax.      ICD-10-CM    1. LLQ abdominal pain  R10.32 CT Abdomen Pelvis w Contrast   2. Urinary frequency  R35.0 UA macro with reflex to Microscopic and Culture - Clinc Collect       Patient Instructions   1. Take Tylenol and or Ibuprofen as needed for pain control. Abdominal CT is looking overall good.   2. Follow up with Urology if urinary symptoms are persisting. No signs of infection of the bladder, kidney, or prostate infection.       HPI:  Balwinder Huang is a 89 year old male who presents today complaining of urinary frequency and urgency.  Patient has not had any fevers.  Symptoms began yesterday. Patient also has LLQ abdominal pain no diarrhea or constipation. No known hx of kidney issues or diverticulitis.     History obtained from the patient.    Problem List:  2017-05: History of nephrolithiasis  2017-03: Chronic idiopathic constipation  2016-06: Primary osteoarthritis of left knee  2016-01: Squamous cell carcinoma of skin, unspecified  Essential Hypercholesterolemia  Joint Stiffness Of The Hip  Essential hypertension  Coronary Artery Disease  Fainting  (Syncope)  Hyperlipidemia  Coronary artery disease involving native coronary artery without   angina pectoris      Past Medical History:   Diagnosis Date     History of transfusion      Hyperlipemia      Hypertension        Social History     Tobacco Use     Smoking status: Never Smoker     Smokeless tobacco: Never Used   Substance Use Topics     Alcohol use: No       Review of Systems   Constitutional: Positive for fever.   Gastrointestinal: Positive for abdominal pain (left lower abdominal). Negative for constipation and diarrhea.   Genitourinary: Positive for frequency and urgency. Negative for hematuria, penile discharge, penile pain and testicular pain.       Vitals:    10/04/22 1104   BP: 134/67   Pulse: 60   Resp: 24   Temp: 97.7  F (36.5  C)   SpO2: 94%   Weight: 69.9 kg (154 lb)       Physical Exam  Vitals and nursing note reviewed.   Constitutional:       General: He is not in acute distress.     Appearance: He is not toxic-appearing or diaphoretic.   HENT:      Head: Normocephalic and atraumatic.      Right Ear: External ear normal.      Left Ear: External ear normal.   Eyes:      Conjunctiva/sclera: Conjunctivae normal.   Pulmonary:      Effort: Pulmonary effort is normal. No respiratory distress.   Abdominal:      General: Abdomen is flat.      Tenderness: There is abdominal tenderness. There is guarding (LLQ). There is no right CVA tenderness or left CVA tenderness.   Neurological:      Mental Status: He is alert.   Psychiatric:         Mood and Affect: Mood normal.         Behavior: Behavior normal.         Thought Content: Thought content normal.         Judgment: Judgment normal.         Results:  Results for orders placed or performed during the hospital encounter of 10/04/22   CT Abdomen Pelvis w Contrast     Status: None    Narrative    EXAM: CT ABDOMEN PELVIS W CONTRAST  LOCATION: [facility]  DATE/TIME: 10/4/2022 2:58 PM    INDICATION: LLQ abdominal pain. Frequent  urination. R O diverticulitis and eval for any other signs of inflamation of bladder prostate etc.  COMPARISON: None.  TECHNIQUE: CT scan of the abdomen and pelvis was performed following injection of IV contrast. Multiplanar reformats were obtained. Dose reduction techniques were used.  CONTRAST: 100ml IV Omnipaque 350    FINDINGS:   LOWER CHEST: Lung base scarring.    HEPATOBILIARY: Multiple gallstones. Small left hepatic cyst.    PANCREAS: Normal.    SPLEEN: Normal.    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    KIDNEYS/BLADDER: Left renal 6 and 3 mm nonobstructive stones. Right renal 2 mm nonobstructive stone. No ureteral stones or hydronephrosis. Small bilateral renal fluid density lesions are most likely cysts and do not require further evaluation.    BOWEL: No obstruction or inflammatory change.    LYMPH NODES: Normal.    VASCULATURE: Aortoiliac atherosclerosis. Infrarenal 3.1 cm aortic ectasia. Dense calcification at the celiac, SMA, and renal artery origins. These vessels appear patent.    PELVIC ORGANS: Enlarged prostate. No periprostatic or perivesical stranding to suggest infection.    MUSCULOSKELETAL: Sternotomy right hip arthroplasty. Convex leftward lower lumbar scoliosis. Multilevel lumbar spine disc degeneration and facet arthropathy.      Impression    IMPRESSION:   1.  Bilateral renal nonobstructive stones.  2.  Gallstones.  3.  Enlarged prostate, without evidence of infection.  4.  Right adrenal 1.9 cm indeterminate lesion. Recommend comparison with any prior CT. If none available, 3-6 month noncontrast CT abdomen recommended to confirm stability.   Creatinine POCT     Status: Normal   Result Value Ref Range    Creatinine POCT 1.0 0.7 - 1.3 mg/dL    GFR, ESTIMATED POCT >60 >60 mL/min/1.73m2   Results for orders placed or performed in visit on 10/04/22   UA macro with reflex to Microscopic and Culture - Clinc Collect     Status: Normal    Specimen: Urine, NOS    Result Value Ref Range    Color Urine Yellow Colorless, Straw, Light Yellow, Yellow    Appearance Urine Clear Clear    Glucose Urine Negative Negative mg/dL    Bilirubin Urine Negative Negative    Ketones Urine Negative Negative mg/dL    Specific Gravity Urine 1.020 1.005 - 1.030    Blood Urine Negative Negative    pH Urine 6.0 5.0 - 8.0    Protein Albumin Urine Negative Negative mg/dL    Urobilinogen Urine 0.2 0.2, 1.0 E.U./dL    Nitrite Urine Negative Negative    Leukocyte Esterase Urine Negative Negative    Narrative    Microscopic not indicated         At the end of the encounter, I discussed results, diagnosis, medications. Discussed red flags for immediate return to clinic/ER, as well as indications for follow up if no improvement. Patient understood and agreed to plan. Patient was stable for discharge.    40 minutes spent on the date of the encounter doing chart review, history and examination, documentation, and further activities as noted.

## 2022-10-07 DIAGNOSIS — I25.10 CARDIOVASCULAR DISEASE: ICD-10-CM

## 2022-10-07 RX ORDER — CLOPIDOGREL BISULFATE 75 MG/1
75 TABLET ORAL DAILY
Qty: 90 TABLET | Refills: 1 | Status: SHIPPED | OUTPATIENT
Start: 2022-10-07 | End: 2023-01-20

## 2022-10-07 NOTE — TELEPHONE ENCOUNTER
"Last Written Prescription Date:  4/25/22  Last Fill Quantity: 90,  # refills: 0   Last office visit provider:  4/11/22     Requested Prescriptions   Pending Prescriptions Disp Refills     clopidogrel (PLAVIX) 75 MG tablet [Pharmacy Med Name: CLOPIDOGREL 75MG TABLETS] 90 tablet 0     Sig: TAKE 1 TABLET(75 MG) BY MOUTH DAILY       Plavix Passed - 10/7/2022 11:28 AM        Passed - No active PPI on record unless is Protonix        Passed - Normal HGB on file in past 12 months     Recent Labs   Lab Test 03/02/22  1148   HGB 13.5               Passed - Normal Platelets on file in past 12 months     Recent Labs   Lab Test 03/02/22  1148                  Passed - Recent (12 mo) or future (30 days) visit within the authorizing provider's specialty     Patient has had an office visit with the authorizing provider or a provider within the authorizing providers department within the previous 12 mos or has a future within next 30 days. See \"Patient Info\" tab in inbasket, or \"Choose Columns\" in Meds & Orders section of the refill encounter.              Passed - Medication is active on med list        Passed - Patient is age 18 or older             Joshua Quijano RN 10/07/22 11:28 AM  " Progress Notes by Gabriella Diaz PSY.D at 10/30/18 09:02 AM     Author:  Gabriella Diaz PSY.D Service:  (none) Author Type:  Psychologist     Filed:  10/30/18 10:56 AM Encounter Date:  10/30/2018 Status:  Signed     :  Gabriella Diaz PSY.D (Psychologist)            Progress Note     Service Provided:  I met with Ernesto for a[RM1.1T] 55[RM1.2M] minute psychotherapy session.[RM1.1T] Check in with mother[RM1.2M]    Mental Status Exam  Ernesto is a 13 year old who appears[RM1.1T]  casually dressed[RM1.1M].  The following areas were assessed:    Behavior:[RM1.1T] cooperative and polite[RM1.1M]  Eye Contact:[RM1.1T] appropriate[RM1.1M]   Speech:[RM1.1T] appropriate rate, rhythm, and volume[RM1.1M]  Gait, Movement and Motor Coordination: within normal limits  Orientation: to person, place, and time   Mood/Affect:[RM1.1T] euthymic[RM1.1M]   Thought Form:[RM1.1T] logical and coherent[RM1.1M]   Cognition:  He[RM1.1T] did not[RM1.1M] appear to have any gross cognitive difficulties that would have prevented him from understanding or interacting with this psychologist.   Insight and Judgment:[RM1.1T] fair[RM1.1M]   Self-Harm:[RM1.1T] none[RM1.1M]  Suicidal Ideation: denied/[RM1.1T] no evidence of suicidal ideation[RM1.1M]  Homicidal Ideation: denied/[RM1.1T]no evidence of homicidal ideation[RM1.1M]    Session Notes:[RM1.1T] Pt reported an overall ok mood- continues to struggle to focus/being disruptive in class/[RM1.1M] disruptive being \"subjective\"- discuss further \"accepting\" diagnosis/ using accommodations/[RM1.2M]     The main themes/theraputic intervention were:[RM1.1T] continued to[RM1.1M] establish rapport with Ernesto toward building a therapeutic alliance, actively built the level of trust with Ernesto through consistent eye contact, active listening, unconditional positive regard, and warm acceptance to help increase Ernesto’s ability to identify and express  concerns    Diagnosis:[RM1.1T]  Autistic Disorder[RM1.1M]     Recommendations:  1. I will continue to follow Ernesto regularly to provide support in his treatment.    2. If Ernesto feels he is a danger to himself or to others,[RM1.1T] he/parents[RM1.1M] are to call 911 or go to the nearest emergency room.  3. Follow up in approximately[RM1.1T] one[RM1.1M] week(s).  4. He was made aware of these recommendations and[RM1.1T] did[RM1.1M] agree to them.    Thank you for the opportunity to participate in the care of Ernesto. Please feel free to contact me at [phone] should you have any questions about my recommendations.    Electronically Signed by:    Gabriella Diaz PSY.D , 10/30/2018[RM1.1T]               Revision History        User Key Date/Time User Provider Type Action    > RM1.2 10/30/18 10:56 AM Gabriella Diaz PSY.D Psychologist Sign     RM1.1 10/30/18 09:02 AM Gabriella Diaz PSY.D Psychologist     M - Manual, T - Template

## 2022-11-17 ENCOUNTER — TRANSFERRED RECORDS (OUTPATIENT)
Dept: HEALTH INFORMATION MANAGEMENT | Facility: CLINIC | Age: 87
End: 2022-11-17

## 2022-12-02 ENCOUNTER — TRANSFERRED RECORDS (OUTPATIENT)
Dept: HEALTH INFORMATION MANAGEMENT | Facility: CLINIC | Age: 87
End: 2022-12-02

## 2023-01-19 DIAGNOSIS — I25.10 CARDIOVASCULAR DISEASE: ICD-10-CM

## 2023-01-20 ENCOUNTER — ANCILLARY PROCEDURE (OUTPATIENT)
Dept: GENERAL RADIOLOGY | Facility: CLINIC | Age: 88
End: 2023-01-20
Attending: FAMILY MEDICINE
Payer: COMMERCIAL

## 2023-01-20 ENCOUNTER — OFFICE VISIT (OUTPATIENT)
Dept: FAMILY MEDICINE | Facility: CLINIC | Age: 88
End: 2023-01-20
Payer: COMMERCIAL

## 2023-01-20 VITALS
TEMPERATURE: 98 F | WEIGHT: 157.2 LBS | OXYGEN SATURATION: 91 % | SYSTOLIC BLOOD PRESSURE: 123 MMHG | HEART RATE: 110 BPM | DIASTOLIC BLOOD PRESSURE: 52 MMHG | BODY MASS INDEX: 26.57 KG/M2 | RESPIRATION RATE: 24 BRPM

## 2023-01-20 DIAGNOSIS — R09.89 CHEST CONGESTION: ICD-10-CM

## 2023-01-20 DIAGNOSIS — Z00.00 ENCOUNTER FOR ANNUAL WELLNESS EXAM IN MEDICARE PATIENT: Primary | ICD-10-CM

## 2023-01-20 DIAGNOSIS — E78.5 HYPERLIPIDEMIA, UNSPECIFIED HYPERLIPIDEMIA TYPE: ICD-10-CM

## 2023-01-20 DIAGNOSIS — I25.10 CARDIOVASCULAR DISEASE: ICD-10-CM

## 2023-01-20 LAB
ALBUMIN SERPL BCG-MCNC: 3.6 G/DL (ref 3.5–5.2)
ALP SERPL-CCNC: 73 U/L (ref 40–129)
ALT SERPL W P-5'-P-CCNC: 22 U/L (ref 10–50)
ANION GAP SERPL CALCULATED.3IONS-SCNC: 13 MMOL/L (ref 7–15)
AST SERPL W P-5'-P-CCNC: 33 U/L (ref 10–50)
BILIRUB SERPL-MCNC: 0.4 MG/DL
BUN SERPL-MCNC: 23.4 MG/DL (ref 8–23)
CALCIUM SERPL-MCNC: 9.4 MG/DL (ref 8.2–9.6)
CHLORIDE SERPL-SCNC: 104 MMOL/L (ref 98–107)
CHOLEST SERPL-MCNC: 150 MG/DL
CREAT SERPL-MCNC: 0.95 MG/DL (ref 0.67–1.17)
DEPRECATED HCO3 PLAS-SCNC: 23 MMOL/L (ref 22–29)
ERYTHROCYTE [DISTWIDTH] IN BLOOD BY AUTOMATED COUNT: 12.7 % (ref 10–15)
GFR SERPL CREATININE-BSD FRML MDRD: 76 ML/MIN/1.73M2
GLUCOSE SERPL-MCNC: 101 MG/DL (ref 70–99)
HCT VFR BLD AUTO: 38.2 % (ref 40–53)
HDLC SERPL-MCNC: 31 MG/DL
HGB BLD-MCNC: 12.4 G/DL (ref 13.3–17.7)
LDLC SERPL CALC-MCNC: 102 MG/DL
MCH RBC QN AUTO: 32.5 PG (ref 26.5–33)
MCHC RBC AUTO-ENTMCNC: 32.5 G/DL (ref 31.5–36.5)
MCV RBC AUTO: 100 FL (ref 78–100)
NONHDLC SERPL-MCNC: 119 MG/DL
PLATELET # BLD AUTO: 345 10E3/UL (ref 150–450)
POTASSIUM SERPL-SCNC: 4.8 MMOL/L (ref 3.4–5.3)
PROT SERPL-MCNC: 7.4 G/DL (ref 6.4–8.3)
RBC # BLD AUTO: 3.81 10E6/UL (ref 4.4–5.9)
SODIUM SERPL-SCNC: 140 MMOL/L (ref 136–145)
TRIGL SERPL-MCNC: 84 MG/DL
WBC # BLD AUTO: 10.1 10E3/UL (ref 4–11)

## 2023-01-20 PROCEDURE — G0439 PPPS, SUBSEQ VISIT: HCPCS | Performed by: FAMILY MEDICINE

## 2023-01-20 PROCEDURE — 71046 X-RAY EXAM CHEST 2 VIEWS: CPT | Mod: TC | Performed by: RADIOLOGY

## 2023-01-20 PROCEDURE — 99213 OFFICE O/P EST LOW 20 MIN: CPT | Mod: 25 | Performed by: FAMILY MEDICINE

## 2023-01-20 PROCEDURE — 85027 COMPLETE CBC AUTOMATED: CPT | Performed by: FAMILY MEDICINE

## 2023-01-20 PROCEDURE — 80061 LIPID PANEL: CPT | Performed by: FAMILY MEDICINE

## 2023-01-20 PROCEDURE — 36415 COLL VENOUS BLD VENIPUNCTURE: CPT | Performed by: FAMILY MEDICINE

## 2023-01-20 PROCEDURE — 80053 COMPREHEN METABOLIC PANEL: CPT | Performed by: FAMILY MEDICINE

## 2023-01-20 RX ORDER — CLOPIDOGREL BISULFATE 75 MG/1
TABLET ORAL
Qty: 90 TABLET | Refills: 1 | Status: SHIPPED | OUTPATIENT
Start: 2023-01-20 | End: 2023-03-29

## 2023-01-20 RX ORDER — CLOPIDOGREL BISULFATE 75 MG/1
75 TABLET ORAL DAILY
Qty: 90 TABLET | Refills: 1
Start: 2023-01-20 | End: 2024-01-30

## 2023-01-20 ASSESSMENT — ACTIVITIES OF DAILY LIVING (ADL)
CURRENT_FUNCTION: PREPARING MEALS REQUIRES ASSISTANCE
CURRENT_FUNCTION: MONEY MANAGEMENT REQUIRES ASSISTANCE
CURRENT_FUNCTION: TRANSPORTATION REQUIRES ASSISTANCE
CURRENT_FUNCTION: SHOPPING REQUIRES ASSISTANCE
CURRENT_FUNCTION: LAUNDRY REQUIRES ASSISTANCE
CURRENT_FUNCTION: HOUSEWORK REQUIRES ASSISTANCE

## 2023-01-20 ASSESSMENT — ENCOUNTER SYMPTOMS
EYE PAIN: 0
ABDOMINAL PAIN: 0
HEMATURIA: 0
FREQUENCY: 0
DIARRHEA: 0
NERVOUS/ANXIOUS: 0
SORE THROAT: 0
FEVER: 0
PARESTHESIAS: 0
DIZZINESS: 0
SHORTNESS OF BREATH: 0
HEADACHES: 0
MYALGIAS: 0
NAUSEA: 0
CHILLS: 0
ARTHRALGIAS: 1
HEARTBURN: 0
CONSTIPATION: 0
COUGH: 1
DYSURIA: 0
HEMATOCHEZIA: 0
WEAKNESS: 0
JOINT SWELLING: 0
PALPITATIONS: 0

## 2023-01-20 NOTE — PROGRESS NOTES
"SUBJECTIVE:   Balwinder is a 89 year old who presents for Preventive Visit.  Patient has been advised of split billing requirements and indicates understanding: Yes  Are you in the first 12 months of your Medicare coverage?  No    Healthy Habits:     In general, how would you rate your overall health?  Fair    Frequency of exercise:  1 day/week    Duration of exercise:  Less than 15 minutes    Do you usually eat at least 4 servings of fruit and vegetables a day, include whole grains    & fiber and avoid regularly eating high fat or \"junk\" foods?  No    Taking medications regularly:  Yes    Medication side effects:  None    Ability to successfully perform activities of daily living:  Transportation requires assistance, shopping requires assistance, preparing meals requires assistance, housework requires assistance, laundry requires assistance and money management requires assistance    Home Safety:  No safety concerns identified    Hearing Impairment:  Difficulty following a conversation in a noisy restaurant or crowded room, need to ask people to speak up or repeat themselves and difficulty understanding soft or whispered speech    In the past 6 months, have you been bothered by leaking of urine?  No    In general, how would you rate your overall mental or emotional health?  Good      PHQ-2 Total Score: 1    Additional concerns today:  No  Sinus Problem   Associated symptoms include congestion and cough. Pertinent negatives include no chills, no ear pain, no sore throat and no shortness of breath.       Have you ever done Advance Care Planning? (For example, a Health Directive, POLST, or a discussion with a medical provider or your loved ones about your wishes): Yes, patient states has an Advance Care Planning document and will bring a copy to the clinic.       Fall risk  Fallen 2 or more times in the past year?: No  Any fall with injury in the past year?: No    Cognitive Screening   1) Repeat 3 items (Leader, Season, " Table)    2) Clock draw: ABNORMAL   3) 3 item recall: Recalls 3 objects  Results: All words remembered, but wrong time on clock.     Mini-CogTM Copyright JEFF Barros. Licensed by the author for use in [facility]; reprinted with permission (lacey@[facility]). All rights reserved.      Do you have sleep apnea, excessive snoring or daytime drowsiness?: no    Reviewed and updated as needed this visit by clinical staff    Allergies               Reviewed and updated as needed this visit by Provider                 Social History     Tobacco Use     Smoking status: Never     Passive exposure: Past     Smokeless tobacco: Never   Substance Use Topics     Alcohol use: No         Alcohol Use 1/20/2023   Prescreen: >3 drinks/day or >7 drinks/week? Not Applicable       NEVER SMOKER  Alcohol use:  none    Current providers sharing in care for this patient include:   Patient Care Team:  Miguel Day MD as PCP - General (Family Practice)  Joshua Gutierrez MD as Assigned PCP    The following health maintenance items are reviewed in Epic and correct as of today:  Health Maintenance   Topic Date Due     ANNUAL REVIEW OF HM ORDERS  Never done     MEDICARE ANNUAL WELLNESS VISIT  01/18/2023     FALL RISK ASSESSMENT  01/20/2024     ADVANCE CARE PLANNING  01/20/2028     DTAP/TDAP/TD IMMUNIZATION (2 - Td or Tdap) 01/18/2032     PHQ-2 (once per calendar year)  Completed     INFLUENZA VACCINE  Completed     Pneumococcal Vaccine: 65+ Years  Completed     ZOSTER IMMUNIZATION  Completed     COVID-19 Vaccine  Completed     IPV IMMUNIZATION  Aged Out     MENINGITIS IMMUNIZATION  Aged Out           Review of Systems   Constitutional: Negative for chills and fever.   HENT: Positive for congestion. Negative for ear pain, hearing loss and sore throat.    Eyes: Negative for pain and visual disturbance.   Respiratory: Positive for cough. Negative for shortness of breath.    Cardiovascular: Negative for chest pain, palpitations and  "peripheral edema.   Gastrointestinal: Negative for abdominal pain, constipation, diarrhea, heartburn, hematochezia and nausea.   Genitourinary: Negative for dysuria, frequency, genital sores, hematuria, impotence, penile discharge and urgency.   Musculoskeletal: Positive for arthralgias. Negative for joint swelling and myalgias.   Skin: Negative for rash.   Neurological: Negative for dizziness, weakness, headaches and paresthesias.   Psychiatric/Behavioral: Negative for mood changes. The patient is not nervous/anxious.      Cough is decreasing.    Some green phlegm.  Improving.  Had fever initially the first week.  No shortness of breath.   Two home tests for Covid were negative.    OBJECTIVE:   /52   Pulse 110   Temp 98  F (36.7  C) (Oral)   Resp 24   Wt 71.3 kg (157 lb 3.2 oz)   SpO2 91%   BMI 26.57 kg/m   Estimated body mass index is 26.57 kg/m  as calculated from the following:    Height as of 4/11/22: 1.638 m (5' 4.5\").    Weight as of this encounter: 71.3 kg (157 lb 3.2 oz).  Physical Exam  GENERAL: healthy, alert and no distress  EYES: Eyes grossly normal to inspection, PERRL and conjunctivae and sclerae normal  HENT: ear canals and TM's normal, nose and mouth without ulcers or lesions  NECK: no adenopathy, no asymmetry, masses, or scars and thyroid normal to palpation  RESP: rhonci and some crackles in the right lower lung field but otherwise with good air movement.   CV: regular rate and rhythm, normal S1 S2, no S3 or S4, no murmur, click or rub, no peripheral edema and peripheral pulses strong  ABDOMEN: soft, nontender, no hepatosplenomegaly, no masses and bowel sounds normal  RECTAL: deferred  MS: no gross musculoskeletal defects noted, no edema  SKIN: no suspicious lesions or rashes  NEURO: Normal strength and tone, mentation intact and speech normal  PSYCH: mentation appears normal, affect normal/bright    CBC RESULTS: Recent Labs   Lab Test 01/20/23  1017   WBC 10.1   RBC 3.81*   HGB 12.4* " "  HCT 38.2*      MCH 32.5   MCHC 32.5   RDW 12.7          Study Result    Narrative & Impression   EXAM: XR CHEST 2 VIEWS  LOCATION: [facility]  DATE/TIME: 1/20/2023 10:26 AM     INDICATION:  Chest congestion  COMPARISON: 06/08/2016                                                                      IMPRESSION: Persistent mild-moderate hypoexpansion of the lungs. Mild bandlike basilar opacities are most suggestive of atelectasis. Remaining lungs are clear. No pleural effusion. Upper normal heart size. Prior sternotomy. Aortic atherosclerosis.              ASSESSMENT / PLAN:       ICD-10-CM    1. Encounter for annual wellness exam in Medicare patient  Z00.00 CBC with platelets     Comprehensive metabolic panel (BMP + Alb, Alk Phos, ALT, AST, Total. Bili, TP)     CBC with platelets     Comprehensive metabolic panel (BMP + Alb, Alk Phos, ALT, AST, Total. Bili, TP)      2. Cardiovascular disease, STABLE ON PLAVIX AND STATIN I25.10 clopidogrel (PLAVIX) 75 MG tablet     Lipid panel     Lipid panel      3. Hyperlipidemia, unspecified hyperlipidemia type, STABLE ON STATIN  E78.5       4. Chest congestion, RESOLVING VIRAL BRONCHITIS (normal white count and cxr without acute infiltrate) R09.89 XR Chest 2 Views          PLAN:   Fasting labs    Continue to follow chest congestion to resolution.  Follow up for fever, shortness or breath or increase in cough.      Patient has been advised of split billing requirements and indicates understanding: Yes      COUNSELING:  Reviewed preventive health counseling, as reflected in patient instructions       Regular exercise       Healthy diet/nutrition      BMI:   Estimated body mass index is 26.57 kg/m  as calculated from the following:    Height as of 4/11/22: 1.638 m (5' 4.5\").    Weight as of this encounter: 71.3 kg (157 lb 3.2 oz).   Weight management plan: Discussed healthy diet and exercise guidelines      He reports that he has never " smoked. He has been exposed to tobacco smoke. He has never used smokeless tobacco.      Appropriate preventive services were discussed with this patient, including applicable screening as appropriate for cardiovascular disease, diabetes, osteopenia/osteoporosis, and glaucoma.  As appropriate for age/gender, discussed screening for colorectal cancer, prostate cancer, breast cancer, and cervical cancer. Checklist reviewing preventive services available has been given to the patient.    Reviewed patients plan of care and provided an AVS. The Basic Care Plan (routine screening as documented in Health Maintenance) for Balwinder meets the Care Plan requirement. This Care Plan has been established and reviewed with the Patient and spouse.      Joshua Gutierrez MD  [facility]    Identified Health Risks:

## 2023-01-20 NOTE — LETTER
January 22, 2023      Balwinder GABY Reina  [address]  [place] [address]        Dear ,  We are writing to inform you of your test results.    Hi Ed:  The hemoglobin is very slightly lbelow the normal range but stable.  The cholesterol panel is good.  The remaining labs are normal.  It was a pleasure seeing you recently in clinic.    Resulted Orders   CBC with platelets   Result Value Ref Range    WBC Count 10.1 4.0 - 11.0 10e3/uL    RBC Count 3.81 (L) 4.40 - 5.90 10e6/uL    Hemoglobin 12.4 (L) 13.3 - 17.7 g/dL    Hematocrit 38.2 (L) 40.0 - 53.0 %     78 - 100 fL    MCH 32.5 26.5 - 33.0 pg    MCHC 32.5 31.5 - 36.5 g/dL    RDW 12.7 10.0 - 15.0 %    Platelet Count 345 150 - 450 10e3/uL   Comprehensive metabolic panel (BMP + Alb, Alk Phos, ALT, AST, Total. Bili, TP)   Result Value Ref Range    Sodium 140 136 - 145 mmol/L    Potassium 4.8 3.4 - 5.3 mmol/L    Chloride 104 98 - 107 mmol/L    Carbon Dioxide (CO2) 23 22 - 29 mmol/L    Anion Gap 13 7 - 15 mmol/L    Urea Nitrogen 23.4 (H) 8.0 - 23.0 mg/dL    Creatinine 0.95 0.67 - 1.17 mg/dL    Calcium 9.4 8.2 - 9.6 mg/dL    Glucose 101 (H) 70 - 99 mg/dL    Alkaline Phosphatase 73 40 - 129 U/L    AST 33 10 - 50 U/L    ALT 22 10 - 50 U/L    Protein Total 7.4 6.4 - 8.3 g/dL    Albumin 3.6 3.5 - 5.2 g/dL    Bilirubin Total 0.4 <=1.2 mg/dL    GFR Estimate 76 >60 mL/min/1.73m2      Comment:      Effective December 21, 2021 eGFRcr in adults is calculated using the 2021 CKD-EPI creatinine equation which includes age and gender (Luis chen al., NEJM, DOI: 10.1056/SXJRge7606222)   Lipid panel   Result Value Ref Range    Cholesterol 150 <200 mg/dL    Triglycerides 84 <150 mg/dL    Direct Measure HDL 31 (L) >=40 mg/dL    LDL Cholesterol Calculated 102 (H) <=100 mg/dL    Non HDL Cholesterol 119 <130 mg/dL    Narrative    Cholesterol  Desirable:  <200 mg/dL    Triglycerides  Normal:  Less than 150 mg/dL  Borderline High:  150-199 mg/dL  High:  200-499 mg/dL  Very High:   Greater than or equal to 500 mg/dL    Direct Measure HDL  Female:  Greater than or equal to 50 mg/dL   Male:  Greater than or equal to 40 mg/dL    LDL Cholesterol  Desirable:  <100mg/dL  Above Desirable:  100-129 mg/dL   Borderline High:  130-159 mg/dL   High:  160-189 mg/dL   Very High:  >= 190 mg/dL    Non HDL Cholesterol  Desirable:  130 mg/dL  Above Desirable:  130-159 mg/dL  Borderline High:  160-189 mg/dL  High:  190-219 mg/dL  Very High:  Greater than or equal to 220 mg/dL       If you have any questions or concerns, please call the clinic at the number listed above.       Sincerely,      Joshua Gutierrez MD

## 2023-01-23 ENCOUNTER — TRANSFERRED RECORDS (OUTPATIENT)
Dept: HEALTH INFORMATION MANAGEMENT | Facility: CLINIC | Age: 88
End: 2023-01-23

## 2023-02-20 ENCOUNTER — TRANSFERRED RECORDS (OUTPATIENT)
Dept: HEALTH INFORMATION MANAGEMENT | Facility: CLINIC | Age: 88
End: 2023-02-20

## 2023-03-06 ENCOUNTER — OFFICE VISIT (OUTPATIENT)
Dept: FAMILY MEDICINE | Facility: CLINIC | Age: 88
End: 2023-03-06
Payer: COMMERCIAL

## 2023-03-06 VITALS
OXYGEN SATURATION: 96 % | RESPIRATION RATE: 18 BRPM | HEART RATE: 59 BPM | SYSTOLIC BLOOD PRESSURE: 120 MMHG | DIASTOLIC BLOOD PRESSURE: 57 MMHG | WEIGHT: 158.2 LBS | TEMPERATURE: 98.2 F | BODY MASS INDEX: 26.74 KG/M2

## 2023-03-06 DIAGNOSIS — M54.9 UPPER BACK PAIN ON LEFT SIDE: Primary | ICD-10-CM

## 2023-03-06 PROCEDURE — 99213 OFFICE O/P EST LOW 20 MIN: CPT | Performed by: FAMILY MEDICINE

## 2023-03-06 RX ORDER — CODEINE SULFATE 15 MG/1
15 TABLET ORAL EVERY 8 HOURS PRN
Qty: 10 TABLET | Refills: 0 | Status: SHIPPED | OUTPATIENT
Start: 2023-03-06 | End: 2023-09-07

## 2023-03-06 NOTE — PATIENT INSTRUCTIONS
Tylenol 500 mg--1000 mg three times a day regularly.    Aleve no more once a day.    Codeine up to every 8 hours for severe pain.--Remember it can make you drowsy, dizzy, and give you nausea.    Ice your upper back for 15 minutes three times a day.    Call Spine Clinic for follow-up [phone]

## 2023-03-06 NOTE — PROGRESS NOTES
OUTPATIENT VISIT NOTE                                                   Date of Visit: 3/6/2023     Chief Complaint   Patient presents with:  Neck Pain: Neck to Lt shoulder. Bruising Lt arm and tingling.            History of Present Illness   Balwinder Huang is a 89 year old male with wife c/o pain in left upper back.  He has had this for at least a couple of months but it has been worse the last couple of days.  He also noted a bruise on his upper left arm two days ago which enlarged yesterday.  He has been seeing a chiropractor about this but no improvement.  He takes aleve every night before bed.  No known trauma.  No numbness or tingling in arm.  No weakness of arm.  He does have chronic left shoulder pain--but this is about the same.  On plavix for heart condition       MEDICATIONS   Current Outpatient Medications   Medication     clopidogrel (PLAVIX) 75 MG tablet     clopidogrel (PLAVIX) 75 MG tablet     codeine 15 MG tablet     multivitamin therapeutic (THERAGRAN) tablet     nitroGLYcerin (NITROSTAT) 0.4 MG sublingual tablet     oxybutynin (DITROPAN XL) 15 MG 24 hr tablet     simvastatin (ZOCOR) 20 MG tablet     tamsulosin (FLOMAX) 0.4 MG capsule     vit C/vit E/lutein/min/omega-3 (OCUVITE ORAL)     No current facility-administered medications for this visit.         SOCIAL HISTORY   Social History     Tobacco Use     Smoking status: Never     Passive exposure: Past     Smokeless tobacco: Never   Substance Use Topics     Alcohol use: No           Physical Exam   Vitals:    03/06/23 1000   BP: 120/57   Pulse: 59   Resp: 18   Temp: 98.2  F (36.8  C)   TempSrc: Oral   SpO2: 96%   Weight: 71.8 kg (158 lb 3.2 oz)        GEN:  NAD, siiting on rollater  MS: tender over left rhomboids and upper left trapezius.    NECK:  No tenderness to percussion over spine.                              FROM   SHOULDER:  Mild tender over top of left shoulder  NEURO:  DTR's:  Triceps: 2/4  BL                                Bicips:   2/4  BL                               Brachioradialis:  2/4  BL                  MOTOR:  Finger Abduction/Adduction: 5/5  BL                                  Thumb Pincer:  5/5  BL                                   Wrist Flexion/Extension:  5/5  BL                                  Elbow Flexion/Extension:  5/5  BL                                  Shoulder Abduction/Adduction 5/5 BL                 Sensation intact to light touch BL   Left UPPER ARM:  10x3 cm bruise upper arm         Assessment and Plan     Upper back pain on left side  Tender over upper back musculature-? Related to pain in left shoulder.  No neuro findings.  Chose to give codeine for severe pain.  Discussed side effects and cautions.  Patient Instructions   Tylenol 500 mg--1000 mg three times a day regularly.    Aleve no more once a day.    Codeine up to every 8 hours for severe pain.--Remember it can make you drowsy, dizzy, and give you nausea.    Ice your upper back for 15 minutes three times a day.    Call Spine Clinic for follow-up [phone]    - codeine 15 MG tablet  Dispense: 10 tablet; Refill: 0                   Discussed signs / symptoms that warrant urgent / emergent medical attention.     Recheck if worsening or not improving.       Salvador Pepe MD          Pertinent History     The following portions of the patient's history were reviewed and updated as appropriate: allergies, current medications, past family history, past medical history, past social history, past surgical history and problem list.

## 2023-03-07 ENCOUNTER — OFFICE VISIT (OUTPATIENT)
Dept: PALLIATIVE MEDICINE | Facility: OTHER | Age: 88
End: 2023-03-07
Attending: STUDENT IN AN ORGANIZED HEALTH CARE EDUCATION/TRAINING PROGRAM
Payer: COMMERCIAL

## 2023-03-07 ENCOUNTER — TELEPHONE (OUTPATIENT)
Dept: PALLIATIVE MEDICINE | Facility: OTHER | Age: 88
End: 2023-03-07

## 2023-03-07 VITALS — HEART RATE: 72 BPM | DIASTOLIC BLOOD PRESSURE: 63 MMHG | OXYGEN SATURATION: 96 % | SYSTOLIC BLOOD PRESSURE: 107 MMHG

## 2023-03-07 DIAGNOSIS — M47.812 CERVICAL SPONDYLOSIS WITHOUT MYELOPATHY: ICD-10-CM

## 2023-03-07 DIAGNOSIS — M79.18 MYOFASCIAL PAIN: Primary | ICD-10-CM

## 2023-03-07 PROCEDURE — G0463 HOSPITAL OUTPT CLINIC VISIT: HCPCS | Performed by: STUDENT IN AN ORGANIZED HEALTH CARE EDUCATION/TRAINING PROGRAM

## 2023-03-07 PROCEDURE — G0463 HOSPITAL OUTPT CLINIC VISIT: HCPCS

## 2023-03-07 PROCEDURE — 99203 OFFICE O/P NEW LOW 30 MIN: CPT | Performed by: STUDENT IN AN ORGANIZED HEALTH CARE EDUCATION/TRAINING PROGRAM

## 2023-03-07 ASSESSMENT — PAIN SCALES - GENERAL: PAINLEVEL: MODERATE PAIN (5)

## 2023-03-07 NOTE — TELEPHONE ENCOUNTER
Screening Questions for Radiology Injections:Trigger point injections in the neck - mostly left side    Injection to be done at which interventional clinic site? [facility]    Procedure ordered by Dr. Tran     Procedure ordered? n/a    Transforaminal Cervical GERARD - Send to [facility] - No [facility] providers perform this procedure      What insurance would patient like us to bill for this procedure? IF SCHEDULING IN [place] PLEASE SCHEDULE AT LEAST 7-10 BUSINESS DAYS OUT SO A PA CAN BE OBTAINED      Worker's comp or MVA (motor vehicle accident) -Any injection DO NOT SCHEDULE and route to Rosalee De La Torre.      [employer] insurance - For SI joint injections, DO NOT SCHEDULE and route to Eloisa Panchal.       ALL BCBS, [employer] and [employer] - DO NOT SCHEDULE and route to Eloisa Panchal    MEDICA- facet joint injections, route to Eloisa Abdulkadir    IF SCHEDULING IN [place] PLEASE SCHEDULE AT LEAST 7-10 BUSINESS DAYS OUT SO A PA CAN BE OBTAINED    Is an  needed? No      Patient has a  home? (Review Grid) NO  Wife will be bring him     Any chance of pregnancy? Not Applicable   If YES, do NOT schedule and route to RN pool  - Dr. Thornton route to Ira Godfrey and PM&R Nurse  [[id number]]      Is patient actively being treated for cancer or immunocompromised? No  If YES, do NOT schedule and route to RN pool/ Dr. Thornton's Team    Does the patient have a bleeding or clotting disorder? No     If YES, okay to schedule AND route to RN nurse katlin/ Dr. Thornton's Team     (For any patients with platelet count <100, RN must forward to provider)    Is patient taking any Blood Thinners OR Antiplatelet medication?  Yes - Plavix   If hold needed, do NOT schedule, route to RN pool/ Dr. Thornton's Team    Examples:   o Blood Thinners: (Coumadin, Warfarin, Jantoven, Pradaxa, Xarelto, Eliquis, Edoxaban, Enoxaparin, Lovenox, Heparin, Arixtra, Fondaparinux or Fragmin)  o Antiplatelet Medications: (Plavix, Brilinta or  Effient)     Is patient taking any aspirin products (includes Excedrin and Fiorinal)? No     If more than 325mg/day, OK to schedule; Instruct Pt to decrease to less than 325 mg for 7 days AND route to RN pool/ Dr. Thornton's Team     For CERVICAL procedures, hold all aspirin products for 6 days.     Tell Pt that if aspirin product is not held for 6 days, the procedure WILL BE cancelled.     Any allergies to contrast dye, iodine, shellfish, or numbing and steroid medications? No    If YES, schedule and add allergy information to appointment notes AND route to the RN pool/ Dr. Thornton's Team    If GERARD and Contrast Dye / Iodine Allergy? DO NOT SCHEDULE, route to RN pool/ Dr. Vieras Team    Allergies: Shellfish containing products [shellfish-derived products] and Ampicillin     Does patient have an active infection or treated for one within the past week? No    Is patient currently taking any antibiotics or steroid medications?  No     For patients on chronic, preventative, or prophylactic antibiotics, procedures may be scheduled.     For patients on antibiotics for active or recent infection, schedule 4 days after completed.    For patients on steroid medications, schedule 4 days after completed.     Has the patient had a flu shot or any other vaccinations within the past 7 days? No  If yes, explain that for the vaccine to work best they need to:       wait 1 week before and 1 week after getting any Vaccine    wait 1 week before and 2 weeks after getting Covid Vaccine #2 or BOOSTER    If patient has concerns about the timing, send to RN pool/ Dr. Thornton's Team    Does patient have an MRI/CT?  Not Applicable Include Date and Check Procedure Scheduling Grid to see if required.    Was the MRI/CT done within the last 3 years?  NA     If no route to RN Pool/ Dr. Vieras Team    If yes, where was the MRI/CT done? n/a     Refer to PACS Transmissions list for approved external locations and route to RN Pool [place]/   Norberto's Team    If MRI was not done at approved external location do NOT schedule and route to RN pool/ Dr. Thornton's Team      If patient has an imaging disc, the injection MAY be scheduled but patient must bring disc to appt or appt will be cancelled.    Is patient able to transfer to a procedure table with minimal or no assistance? Yes maybe some assistance uses walker    If no, do NOT schedule and route to RN Pool/ Dr. Thornton's Team    Procedure Specific Instructions:    If celiac plexus block, informed patient NPO for 6 hours and that it is okay to take medications with sips of water, especially blood pressure medications Not Applicable         If this is for a cervical procedure, informed patient that aspirin needs to be held for 6 days.   Not Applicable      Sedation, If Sedation is ordered for any procedure, patient must be NPO for 6 hours prior to procedure Not Applicable      If IV needed:    Do not schedule procedures requiring IV placement in the first appointment of the day or first appointment after lunch. Do NOT schedule at 0745, 0815 or 1245. n/a    Instructed patient to arrive 30 minutes early for IV start if required. (Check Procedure Scheduling Grid)  Not Applicable    Reminders:    If you are started on any steroids or antibiotics between now and your appointment, you must contact us because the procedure may need to be cancelled.  No      As a reminder, receiving steroids can decrease your body's ability to fight infection.   Would you still like to move forward with scheduling the injection?  Yes      IV Sedation is not provided for procedures. If oral anti-anxiety medication is needed, the patient should request this from their referring provider.      Instruct patient to arrive as directed prior to the scheduled appointment time:  If IV needed 30 minutes before appointment time       For patients 85 or older we recommend having an adult stay w/ them for the remainder of the day.       If the  patient is Diabetic, remind them to bring their glucometer.      Does the patient have any questions?  ENMANUEL Olvera  [facility]

## 2023-03-07 NOTE — NURSING NOTE
Patient presents to the clinic today for a follow up with Abilio Tran MD  regarding Pain Management.       PEG Score 3/7/2023   PEG Total Score 6.33          Ira Golden MA  [facility] [place]

## 2023-03-07 NOTE — PATIENT INSTRUCTIONS
Procedures: Trigger point injections for left neck  Physical Therapy: Ordered  Diagnostic Studies: MRI   Medication Management: continue Tylenol  Follow up: 2 months    Abilio Tran MD  Interventional Pain Medicine  [facility]    Clinic Number:  [phone]  Call with any questions about your care and for scheduling assistance.   Calls are returned Monday through Friday between 8 AM and 4:30 PM. We usually get back to you within 2 business days depending on the issue/request.    If we are prescribing your medications:  For opioid medication refills, call the clinic or send a [facility] message 7 days in advance.  Please include:  Name of requested medication  Name of the pharmacy.  For non-opioid medications, call your pharmacy directly to request a refill. Please allow 3-4 days to be processed.   Per [place] State Law:  All controlled substance prescriptions must be filled within 30 days of being written.    For those controlled substances allowing refills, pickup must occur within 30 days of last fill.      We believe regular attendance is key to your success in our program!    Any time you are unable to keep your appointment we ask that you call us at least 24 hours in advance to cancel.This will allow us to offer the appointment time to another patient.   Multiple missed appointments may lead to dismissal from the clinic.

## 2023-03-07 NOTE — PROGRESS NOTES
"                      [facility] Interventional Evaluation    Date of visit: 3/7/2023    Reason for consultation:    Balwinder Huang is a 89 year old male who is seen in consultation today for evaluation of an interventional strategy for pain management.    PCP is Miguel Day.    Please see the [facility] [place] health questionnaire which the patient completed and reviewed with me in detail.    Chief Complaint:    Chief Complaint   Patient presents with     Pain     Neck and shoulder Pain       Pain history:  Balwinder Huang is a 89 year old male presenting with a chief pain complaint of neck pain    Onset: several months - no inciting event  Location and Radiation: left side of the neck radiating to the left shoulder  Provoking: any movement of the neck  Palliating: keeping the neck still  Quality: dull  Severity: \"not bothering me\" to a 10/10  Timing: constant  Numbness: in left arm when wakes, but this resolves  Weakness: none    Patient has a large bruise on the left anterior arm    Patient denies red flag symptoms of corresponding bowel/bladder symptoms, fever/chills, saddle anesthesia, profound motor loss, weight loss, or sudden unremitting increase in pain.    Current pain medications include:  Codeine 15mg - haven't picked up yet  Tylenol - helpful    Previous medication treatments included:  Aleve - helpful    Other treatments have included:  Balwinder Huang has not been seen at a pain clinic in the past.  For right hip  PT: none  Injections: none  Surgery: none  Alternative: chiropractor - has been going, helped a little    Past Medical History:  Past Medical History:   Diagnosis Date     History of transfusion      Hyperlipemia      Hypertension      Patient Active Problem List    Diagnosis Date Noted     History of nephrolithiasis 05/05/2017     Priority: Medium     Surgery R kidney for removal         Chronic idiopathic constipation 03/30/2017     Priority: " Medium     Coronary Artery Disease      Priority: Medium     Created by Conversion  Replacement Utility updated for latest IMO load         Primary osteoarthritis of left knee 06/07/2016     Priority: Medium     Essential hypertension      Priority: Medium     Created by Conversion         Hyperlipidemia      Priority: Medium     Coronary artery disease involving native coronary artery without angina pectoris      Priority: Medium     Squamous cell carcinoma of skin, unspecified 01/14/2016     Priority: Medium     IMO Update         Fainting (Syncope)      Priority: Medium     Created by Conversion         Essential Hypercholesterolemia      Priority: Medium     Created by Conversion         Joint Stiffness Of The Hip      Priority: Medium     Created by Conversion           Past Surgical History:  Past Surgical History:   Procedure Laterality Date     APPENDECTOMY       IR LUMBAR EPIDURAL STEROID INJECTION  12/13/2006     IR LUMBAR EPIDURAL STEROID INJECTION  12/6/2007     JOINT REPLACEMENT Right     hip     OTHER SURGICAL HISTORY [facility]2016    [facility]     TOTAL HIP ARTHROPLASTY Right      ZZC CABG, VEIN, SINGLE      Description: CABG (CABG);  Recorded: 08/04/2014;     [facility] CORONARY STENT PERCUT, INITIAL VESSEL      Description: Cath Stent Placement;  Recorded: 08/04/2014;     Medications:  Current Outpatient Medications   Medication Sig Dispense Refill     clopidogrel (PLAVIX) 75 MG tablet TAKE 1 TABLET(75 MG) BY MOUTH DAILY 90 tablet 1     clopidogrel (PLAVIX) 75 MG tablet Take 1 tablet (75 mg) by mouth daily 90 tablet 1     codeine 15 MG tablet Take 1 tablet (15 mg) by mouth every 8 hours as needed for severe pain (7-10) 10 tablet 0     multivitamin therapeutic (THERAGRAN) tablet [MULTIVITAMIN THERAPEUTIC (THERAGRAN) TABLET] Take 1 tablet by mouth daily.       nitroGLYcerin (NITROSTAT) 0.4 MG sublingual tablet nitroglycerin 0.4 mg sublingual tablet   PLACE ONE TABLET UNDER TONGUE AS  NEEDED FOR CHEST PAIN EVERY 5 MINUTES AS DIRECTED       oxybutynin (DITROPAN XL) 15 MG 24 hr tablet [OXYBUTYNIN (DITROPAN XL) 15 MG 24 HR TABLET] Take 15 mg by mouth daily.       simvastatin (ZOCOR) 20 MG tablet TAKE 1 TABLET(20 MG) BY MOUTH AT BEDTIME 90 tablet 3     tamsulosin (FLOMAX) 0.4 MG capsule TAKE 2 CAPSULES(0.8 MG) BY MOUTH DAILY AFTER BREAKFAST 180 capsule 2     vit C/vit E/lutein/min/omega-3 (OCUVITE ORAL) [VIT C/VIT E/LUTEIN/MIN/OMEGA-3 (OCUVITE ORAL)] Take 1 tablet by mouth daily.       Allergies:     Allergies   Allergen Reactions     Shellfish Containing Products [Shellfish-Derived Products] Unknown     Shrimp     Ampicillin Rash     On face     Family history:  Family History   Problem Relation Age of Onset     Prostate Cancer Brother        Physical Exam:  Vitals:    03/07/23 1300   BP: 107/63   Pulse: 72   SpO2: 96%     Exam:  Constitutional: Well developed, NAD  Head: normocephalic. Atraumatic.   Eyes: no redness or jaundice noted   CV: warm, well perfused extremities   Skin: no suspicious lesions or rashes   Psychiatric: mentation appears normal and affect full    Neuromuscular Examination:  Decreased ROM in cervical flexion, extension, bilateral lateral flexion, and bilateral rotation, with pain in all planes except for extension and rightward rotation  Tender to palpation of the cervical paraspinals on left, left trapezius, left rhomboid. nontender at midline cervical spine  Normal 5/5 strength in BUE except for L shoulder abduction 4/5  Normal sensation to light touch in the C4-T1 distributions bilaterally  Negative Sidhu's bilaterally     Spurling: negative bilaterally     Diagnostic tests:  none    Personally reviewed imaging: none    [place] Prescription Monitoring Program reviewed: nicolasa    Outside records reviewed: no    Assessment:  1. Cervical spondylosis  2. Myofascial pain    Balwinder Huang is a 89 year old male who presents with several months of left-sided neck pain shoulder pain.  Pain  did not have any inciting incident, is not associate with any radiating pain down the left arm, is not associated with ongoing numbness or weakness in the left arm or hand.  Patient has been taking Advil for this with some relief.  He was recently started on Tylenol which is helping significantly.  He has a prescription for codeine which she has not picked up yet.  On examination he has tenderness to palpation of the left paraspinals over the facet joints and tenderness to the left trapezius muscle as well as left rhomboids.  I have placed an order for physical therapy as well as trigger point injections of the left neck.  We will see him back in 2 months to see what benefit he is getting from these interventions.    Plan:  Diagnosis reviewed, treatment option addressed, and risk/benefits discussed.     1. Procedures: Trigger point injections for left neck  2. Physical Therapy: Ordered  3. Diagnostic Studies: MRI   4. Medication Management: continue Tylenol  5. Follow up: 2 months    32 minutes spent on the date of encounter doing chart review, history, and exam documentation and further activities as noted above.     Abilio Tran MD  Interventional Pain Medicine  [facility]

## 2023-03-18 ENCOUNTER — TRANSFERRED RECORDS (OUTPATIENT)
Dept: HEALTH INFORMATION MANAGEMENT | Facility: CLINIC | Age: 88
End: 2023-03-18

## 2023-03-28 ASSESSMENT — PAIN SCALES - PAIN ENJOYMENT GENERAL ACTIVITY SCALE (PEG)
AVG_PAIN_PASTWEEK: 8
INTERFERED_GENERAL_ACTIVITY: 6
PEG_TOTALSCORE: 6.67
INTERFERED_ENJOYMENT_LIFE: 6

## 2023-03-29 ENCOUNTER — OFFICE VISIT (OUTPATIENT)
Dept: PALLIATIVE MEDICINE | Facility: OTHER | Age: 88
End: 2023-03-29
Payer: COMMERCIAL

## 2023-03-29 VITALS — DIASTOLIC BLOOD PRESSURE: 60 MMHG | HEART RATE: 59 BPM | OXYGEN SATURATION: 95 % | SYSTOLIC BLOOD PRESSURE: 121 MMHG

## 2023-03-29 DIAGNOSIS — M79.18 MYOFASCIAL PAIN: Primary | ICD-10-CM

## 2023-03-29 PROCEDURE — 20553 NJX 1/MLT TRIGGER POINTS 3/>: CPT | Performed by: STUDENT IN AN ORGANIZED HEALTH CARE EDUCATION/TRAINING PROGRAM

## 2023-03-29 PROCEDURE — 250N000011 HC RX IP 250 OP 636: Performed by: STUDENT IN AN ORGANIZED HEALTH CARE EDUCATION/TRAINING PROGRAM

## 2023-03-29 PROCEDURE — 250N000009 HC RX 250: Performed by: STUDENT IN AN ORGANIZED HEALTH CARE EDUCATION/TRAINING PROGRAM

## 2023-03-29 PROCEDURE — 96372 THER/PROPH/DIAG INJ SC/IM: CPT | Performed by: STUDENT IN AN ORGANIZED HEALTH CARE EDUCATION/TRAINING PROGRAM

## 2023-03-29 RX ORDER — LIDOCAINE HYDROCHLORIDE 10 MG/ML
5 INJECTION, SOLUTION EPIDURAL; INFILTRATION; INTRACAUDAL; PERINEURAL ONCE
Status: DISCONTINUED | OUTPATIENT
Start: 2023-03-29 | End: 2023-03-29

## 2023-03-29 RX ORDER — TRIAMCINOLONE ACETONIDE 40 MG/ML
40 INJECTION, SUSPENSION INTRA-ARTICULAR; INTRAMUSCULAR ONCE
Status: COMPLETED | OUTPATIENT
Start: 2023-03-29 | End: 2023-03-29

## 2023-03-29 RX ORDER — BUPIVACAINE HYDROCHLORIDE 5 MG/ML
4.5 INJECTION, SOLUTION EPIDURAL; INTRACAUDAL ONCE
Status: COMPLETED | OUTPATIENT
Start: 2023-03-29 | End: 2023-03-29

## 2023-03-29 RX ORDER — BUPIVACAINE HYDROCHLORIDE 5 MG/ML
10 INJECTION, SOLUTION EPIDURAL; INTRACAUDAL ONCE
Status: DISCONTINUED | OUTPATIENT
Start: 2023-03-29 | End: 2023-03-29

## 2023-03-29 RX ORDER — LIDOCAINE HYDROCHLORIDE 10 MG/ML
4.5 INJECTION, SOLUTION EPIDURAL; INFILTRATION; INTRACAUDAL; PERINEURAL ONCE
Status: COMPLETED | OUTPATIENT
Start: 2023-03-29 | End: 2023-03-29

## 2023-03-29 RX ADMIN — BUPIVACAINE HYDROCHLORIDE 22.5 MG: 5 INJECTION, SOLUTION EPIDURAL; INTRACAUDAL; PERINEURAL at 11:27

## 2023-03-29 RX ADMIN — LIDOCAINE HYDROCHLORIDE 4.5 ML: 10 SOLUTION INTRAVENOUS at 11:27

## 2023-03-29 RX ADMIN — TRIAMCINOLONE ACETONIDE 40 MG: 40 INJECTION, SUSPENSION INTRA-ARTICULAR; INTRAMUSCULAR at 11:25

## 2023-03-29 ASSESSMENT — PAIN SCALES - GENERAL: PAINLEVEL: EXTREME PAIN (8)

## 2023-03-29 NOTE — PATIENT INSTRUCTIONS
[facility]  Post Procedure Instructions    Today you saw:  Dr. Tran    Today you had:  trigger point injections                           Medications used:  lidocaine   bupivicaine   kenolog         Go to the emergency room if you develop any shortness of breath  Monitor the injection sites for signs and symptoms of infection-fever, chills, redness, swelling, warmth, or drainage to areas.  You may have soreness at injection sites for up to 24 hours.  It may take up to 14 days for the steroid medication to start working although you may feel the effect as early as a few days after the procedure.     You may apply ice to the painful areas to help minimize the discomfort of the needle pokes.  Do not apply heat to sites for at least 12 hours.  You may use anti-inflammatory medications or Tylenol for pain control if necessary    Pain Clinic phone number:  [phone]

## 2023-03-29 NOTE — PROGRESS NOTES
Pre procedure Diagnosis: myofascial pain   Post procedure Diagnosis: Same  Procedure performed: trigger point injections  Anesthesia: none  Complications: none  Operators: Abilio Tran MD    Indications:   Balwinder Huang is a 89 year old male with a history of myofascial pain.  Exam shows myofascial pain of the muscle groups listed below and they have tried conservative treatment including medications.    Options/alternatives, benefits and risks were discussed with the patient including bleeding, infection, tissue trauma and pnuemothorax.  Questions were answered to his satisfaction and he agrees to proceed. Voluntary informed consent was obtained and signed.     Vitals were reviewed: Yes  Allergies were reviewed:  Yes   Medications were reviewed:  Yes   Pre-procedure pain score: 8/10    Procedure:  After getting informed consent, a Pause for the Cause was performed.    Trigger points were identified by patient, and marked when appropriate.  The area was prepped with Chloroprep.    Using clean technique, injections were completed using a 25G, 1.5 inch needle.  After negative aspiration, injection was completed.  A total of 9 locations were injected.  When possible, tissue was retracted from the chest wall to avoid lung injury.    Muscle groups injected:  Left cervical paraspinals  Left levator scapulae  Left thoracic paraspinals  Left rhomboids    Injection solution contained:  4.5ml of 1% lidocaine and 4.5ml of 0.5% bupivacaine and 40mg kenalog.    Hemostasis was achieved, the area was cleaned, and bandaids were placed when appropriate.  The patient tolerated the procedure well.    Post-procedure pain score: see flowsheet/10  Follow-up includes:   -repeat prn    Abilio Tran MD  Interventional Pain Medicine  [facility]

## 2023-04-05 ENCOUNTER — HOSPITAL ENCOUNTER (OUTPATIENT)
Dept: PHYSICAL THERAPY | Facility: REHABILITATION | Age: 88
Discharge: HOME | End: 2023-04-05
Attending: STUDENT IN AN ORGANIZED HEALTH CARE EDUCATION/TRAINING PROGRAM
Payer: COMMERCIAL

## 2023-04-05 DIAGNOSIS — M47.812 CERVICAL SPONDYLOSIS WITHOUT MYELOPATHY: ICD-10-CM

## 2023-04-05 DIAGNOSIS — M79.18 MYOFASCIAL PAIN: ICD-10-CM

## 2023-04-05 PROCEDURE — 97161 PT EVAL LOW COMPLEX 20 MIN: CPT | Mod: GP

## 2023-04-05 PROCEDURE — 97110 THERAPEUTIC EXERCISES: CPT | Mod: GP

## 2023-04-10 NOTE — PROGRESS NOTES
[facility]    OUTPATIENT PHYSICAL THERAPY ORTHOPEDIC EVALUATION  PLAN OF TREATMENT FOR OUTPATIENT REHABILITATION  (COMPLETE FOR INITIAL CLAIMS ONLY)  Patient's Last Name, First Name, M.I.  YOB: 1931  Balwinder Huang    Provider s Name:  [facility]   Medical Record No.  [id number]   Start of Care Date:  04/05/23   Onset Date:  03/07/23   Type:     _X__PT   ___OT   ___SLP Medical Diagnosis:  Myofascial pain; cervical spondylosis without myelopathy     PT Diagnosis:  L sided neck pain with L shoulder pain   Visits from SOC:  1      _________________________________________________________________________________  Plan of Treatment/Functional Goals:  balance training, gait training, joint mobilization, manual therapy, neuromuscular re-education, ROM, strengthening, stretching     Cryotherapy, Electrical stimulation, Hot packs, TENS, Traction     Goals  Goal Identifier: HEP  Goal Description: Pt will be independent with HEP to improve mobility and decrease pain.  Target Date: 06/01/23    Goal Identifier: Sleep  Goal Description: Pt will report ability to sleep greater than 4 hours with less than 2/10 neck pain to improve functional mobility and improve quality of life.  Target Date: 06/01/23    Goal Identifier: Putting on clothes  Goal Description: Pt will report ability to put on clothes with less than 2/10 neck pain to improve functional mobility and increase tolerance completing ADL's.  Target Date: 06/01/23                                                           Therapy Frequency:  1 time/week  Predicted Duration of Therapy Intervention:  1 time a week or every other week, for a minimum of 8 weeks, minimum of 4 sessions    Rachele Richardson, PT                 I CERTIFY THE NEED FOR THESE SERVICES FURNISHED UNDER        THIS PLAN OF TREATMENT AND WHILE UNDER MY CARE      (Physician co-signature of this document indicates review and certification of the therapy plan).                     Certification Date From:  04/05/23   Certification Date To:  06/01/23    Referring Provider:  Abilio Tran MD    Initial Assessment        See Epic Evaluation Start of Care Date: 04/05/23 04/05/23 1500   General Information   Type of Visit Initial OP Ortho PT Evaluation   Start of Care Date 04/05/23   Referring Physician Abilio Tran MD   Patient/Family Goals Statement relief from pain   Orders Evaluate and Treat   Orders Comment Please teach HEP in 2-3 visits, work on stretching and stabilizing program   Date of Order 03/07/23   Certification Required? Yes   Medical Diagnosis Myofascial pain; cervical spondylosis without myelopathy   Surgical/Medical history reviewed Yes   Precautions/Limitations no known precautions/limitations   Weight-Bearing Status - LUE full weight-bearing   Weight-Bearing Status - RUE full weight-bearing   Weight-Bearing Status - LLE full weight-bearing   Weight-Bearing Status - RLE full weight-bearing   Body Part(s)   Body Part(s) Cervical Spine   Presentation and Etiology   Pertinent history of current problem (include personal factors and/or comorbidities that impact the POC) Pt reports that he has been having neck pain for the past couple of months. Pt reports no incident or injury. Pt reports that his pain starts in the center of the back of his neck, down to his L mid back and goes into his L shoulder. Pt reports that he has pain when looking up and is more painful at night. Pt reports that if he sits for too long he has more pain. Pt reports that he also has L shoulder pain that is different than his neck pain. Pt reports that he would need a L shoulder replacement, but is too old to have the surgery. Pt reports that he has had injections in the past for his pain which helped a little. Pt reports sometimes has tingling in the back of his  shoulder. Pt reports that it is difficult to fall asleep due to the pain. Pt reports that his neck is sore in the morning, but after moving around, it becomes more bearable.   Impairments A. Pain;D. Decreased ROM;E. Decreased flexibility;F. Decreased strength and endurance;L. Tingling   Functional Limitations perform activities of daily living;perform required work activities;perform desired leisure / sports activities   Symptom Location Back of neck into L midback and L shoulder   How/Where did it occur From insidious onset   Onset date of current episode/exacerbation 03/07/23   Chronicity New   Pain rating (0-10 point scale) Best (/10);Worst (/10)  (Current: 6-7/10)   Best (/10) times when hardly feel it   Worst (/10) occasionally up to 10/10, doesn't last long and can drop to 5-6/10   Pain quality C. Aching   Frequency of pain/symptoms B. Intermittent   Pain/symptoms are: Worse during the night   Pain/symptoms exacerbated by A. Sitting;G. Certain positions  (looking up, laying down in bed, sitting too long, when putting on clothes)   Pain/symptoms eased by I. OTC medication(s)  (Tylenol, ibuprofen, cream)   Progression of symptoms since onset: Unchanged   Prior Level of Function   Prior Level of Function-Mobility Independent   Prior Level of Function-ADLs Independent   Functional Level Prior Comment Independent   Current Level of Function   Patient role/employment history F. Retired   Current equipment-Gait/Locomotion Walker   Fall Risk Screen   Fall screen completed by PT   Have you fallen 2 or more times in the past year? No   Have you fallen and had an injury in the past year? No   Is patient a fall risk? No   Abuse Screen (yes response referral indicated)   Feels Unsafe at Home or Work/School no   Feels Threatened by Someone no   Does Anyone Try to Keep You From Having Contact with Others or Doing Things Outside Your Home? no   Physical Signs of Abuse Present no   System Outcome Measures   Outcome Measures    (NDI: 38% disability)   Cervical Spine   Cervical Left Side Bending ROM 20 degrees, left sided neck pain   Cervical Right Rotation ROM 55 degrees, left sided neck pain   Cervical Left Rotation ROM 60 degrees, left sided neck pain   Cervical Flexion ROM 48 degrees, no pain   Cervical Extension ROM 20 degrees, stretches/hurt   Cervical Right Side Bending ROM 25 degrees, left sided neck pain   Thoracic Flexion ROM 50%   Thoracic Extension ROM 50%   Thoracic Right Side Bending ROM 25%   Thoracic Left Sidebending ROM  25%   Thoracic Right Rotation 50%   Thoracic Left Rotation 50%   Shoulder AROM Screen L shoulder: flexion: 80 degrees painful; abduction: 65 degrees, very painful; ER: 50 degrees, mild pain; IR: L3; R shoulder: flexion: 125 degrees; abduction: 120 degrees; ER: 40 degrees; IR: T10   Shoulder Shrug (C2-C4) Strength B: 4/5   Shoulder Abd (C5) Strength R: 4/5, L: 3-/5   Shoulder ER (C5, C6) Strength R: 4/5, L: 3-/5   Shoulder IR (C5, C6) Strength R: 4/5, L: 3-/5   Upper Trapezius Flexibility Limited on L   Levator Scapula Flexibility Limited on L   Palpation Tenderness/pain upon palpation of L upper rhomboid musculature   Posture Mild forward head   Planned Therapy Interventions   Planned Therapy Interventions balance training;gait training;joint mobilization;manual therapy;neuromuscular re-education;ROM;strengthening;stretching   Planned Modality Interventions   Planned Modality Interventions Cryotherapy;Electrical stimulation;Hot packs;TENS;Traction   Clinical Impression   Criteria for Skilled Therapeutic Interventions Met yes, treatment indicated   PT Diagnosis L sided neck pain with L shoulder pain   Influenced by the following impairments Decreased cervical ROM limited due to pain, decreased L shoulder ROM limited due to pain, decreased BUE strength, limited flexibility on L, pain   Functional limitations due to impairments Impaired functional mobility, decreased tolerance completing ADL's, recreational  activities and work related tasks   Clinical Presentation Stable/Uncomplicated   Clinical Decision Making (Complexity) Low complexity   Therapy Frequency 1 time/week   Predicted Duration of Therapy Intervention (days/wks) 1 time a week or every other week, for a minimum of 8 weeks, minimum of 4 sessions   Risk & Benefits of therapy have been explained Yes   Patient, Family & other staff in agreement with plan of care Yes   Clinical Impression Comments Pt is a 89 year old male who presents to physical therapy with reports of left sided neck pain with L shoulder pain. Pt has decreased cervical and L shoulder ROM limited due to pain. Pt has decreased BUE strength. Pt scored 38% disability on Neck Disability Index with reports of pain limiting lifting, sleeping and driving. Pt would benefit from physical therapy to improve ROM, increase strength and decrease pain to improve functional mobility and increase tolerance completing ADL's, recreational activities and work related tasks.   ORTHO GOALS   PT Ortho Eval Goals 1;2;3   Ortho Goal 1   Goal Identifier HEP   Goal Description Pt will be independent with HEP to improve mobility and decrease pain.   Target Date 06/01/23   Ortho Goal 2   Goal Identifier Sleep   Goal Description Pt will report ability to sleep greater than 4 hours with less than 2/10 neck pain to improve functional mobility and improve quality of life.   Target Date 06/01/23   Ortho Goal 3   Goal Identifier Putting on clothes   Goal Description Pt will report ability to put on clothes with less than 2/10 neck pain to improve functional mobility and increase tolerance completing ADL's.   Target Date 06/01/23   Total Evaluation Time   PT Eval, Low Complexity Minutes (06885) 20   Therapy Certification   Certification date from 04/05/23   Certification date to 06/01/23   Medical Diagnosis Myofascial pain; cervical spondylosis without myelopathy     Rachele Richardson, PT, DPT

## 2023-04-12 ENCOUNTER — HOSPITAL ENCOUNTER (OUTPATIENT)
Dept: PHYSICAL THERAPY | Facility: REHABILITATION | Age: 88
Discharge: HOME | End: 2023-04-12
Attending: STUDENT IN AN ORGANIZED HEALTH CARE EDUCATION/TRAINING PROGRAM
Payer: COMMERCIAL

## 2023-04-12 DIAGNOSIS — M79.18 MYOFASCIAL PAIN: Primary | ICD-10-CM

## 2023-04-12 DIAGNOSIS — M47.812 CERVICAL SPONDYLOSIS WITHOUT MYELOPATHY: ICD-10-CM

## 2023-04-12 PROCEDURE — 97110 THERAPEUTIC EXERCISES: CPT | Mod: GP

## 2023-04-12 PROCEDURE — 97140 MANUAL THERAPY 1/> REGIONS: CPT | Mod: GP

## 2023-04-16 DIAGNOSIS — E78.00 PURE HYPERCHOLESTEROLEMIA: ICD-10-CM

## 2023-04-16 DIAGNOSIS — I10 ESSENTIAL HYPERTENSION: ICD-10-CM

## 2023-04-16 RX ORDER — ATENOLOL 25 MG/1
TABLET ORAL
Qty: 90 TABLET | Refills: 3 | OUTPATIENT
Start: 2023-04-16

## 2023-04-16 NOTE — TELEPHONE ENCOUNTER
"Atenolol was discontinued by provider at annual visit on 1/20/23    Routing refill request to provider for review/approval because:  A break in medication    Last Written Prescription Date:  1/23/22  Last Fill Quantity: 90,  # refills: 3   Last office visit provider:  1/20/23     Requested Prescriptions   Pending Prescriptions Disp Refills     simvastatin (ZOCOR) 20 MG tablet [Pharmacy Med Name: SIMVASTATIN 20MG TABLETS] 90 tablet 3     Sig: TAKE 1 TABLET(20 MG) BY MOUTH AT BEDTIME       Statins Protocol Passed - 4/16/2023  5:45 AM        Passed - LDL on file in past 12 months     Recent Labs   Lab Test 01/20/23  1017   *             Passed - No abnormal creatine kinase in past 12 months     No lab results found.             Passed - Recent (12 mo) or future (30 days) visit within the authorizing provider's specialty     Patient has had an office visit with the authorizing provider or a provider within the authorizing providers department within the previous 12 mos or has a future within next 30 days. See \"Patient Info\" tab in inbasket, or \"Choose Columns\" in Meds & Orders section of the refill encounter.              Passed - Medication is active on med list        Passed - Patient is age 18 or older         Refused Prescriptions Disp Refills     atenolol (TENORMIN) 25 MG tablet [Pharmacy Med Name: ATENOLOL 25MG TABLETS] 90 tablet 3     Sig: TAKE 1 TABLET(25 MG) BY MOUTH DAILY       Beta-Blockers Protocol Failed - 4/16/2023  5:45 AM        Failed - Medication is active on med list        Passed - Blood pressure under 140/90 in past 12 months     BP Readings from Last 3 Encounters:   03/29/23 121/60   03/07/23 107/63   03/06/23 120/57                 Passed - Patient is age 6 or older        Passed - Recent (12 mo) or future (30 days) visit within the authorizing provider's specialty     Patient has had an office visit with the authorizing provider or a provider within the authorizing providers department " "within the previous 12 mos or has a future within next 30 days. See \"Patient Info\" tab in inbasket, or \"Choose Columns\" in Meds & Orders section of the refill encounter.                   Kathy Clayton RN 04/16/23 11:17 AM  "

## 2023-04-17 RX ORDER — SIMVASTATIN 20 MG
TABLET ORAL
Qty: 90 TABLET | Refills: 3 | Status: SHIPPED | OUTPATIENT
Start: 2023-04-17 | End: 2024-01-30

## 2023-05-10 ENCOUNTER — HOSPITAL ENCOUNTER (OUTPATIENT)
Dept: PHYSICAL THERAPY | Facility: REHABILITATION | Age: 88
Discharge: HOME | End: 2023-05-10
Payer: COMMERCIAL

## 2023-05-10 DIAGNOSIS — M79.18 MYOFASCIAL PAIN: Primary | ICD-10-CM

## 2023-05-10 DIAGNOSIS — M47.812 CERVICAL SPONDYLOSIS WITHOUT MYELOPATHY: ICD-10-CM

## 2023-05-10 PROCEDURE — 97110 THERAPEUTIC EXERCISES: CPT | Mod: GP

## 2023-05-10 PROCEDURE — 97140 MANUAL THERAPY 1/> REGIONS: CPT | Mod: GP

## 2023-05-11 ENCOUNTER — OFFICE VISIT (OUTPATIENT)
Dept: PALLIATIVE MEDICINE | Facility: OTHER | Age: 88
End: 2023-05-11
Attending: STUDENT IN AN ORGANIZED HEALTH CARE EDUCATION/TRAINING PROGRAM
Payer: COMMERCIAL

## 2023-05-11 VITALS — HEART RATE: 60 BPM | SYSTOLIC BLOOD PRESSURE: 127 MMHG | DIASTOLIC BLOOD PRESSURE: 60 MMHG | OXYGEN SATURATION: 95 %

## 2023-05-11 DIAGNOSIS — M54.12 CERVICAL RADICULOPATHY: Primary | ICD-10-CM

## 2023-05-11 DIAGNOSIS — M79.18 MYOFASCIAL PAIN: ICD-10-CM

## 2023-05-11 DIAGNOSIS — M67.912 TENDINOPATHY OF LEFT ROTATOR CUFF: ICD-10-CM

## 2023-05-11 PROCEDURE — G0463 HOSPITAL OUTPT CLINIC VISIT: HCPCS

## 2023-05-11 PROCEDURE — 99214 OFFICE O/P EST MOD 30 MIN: CPT | Performed by: STUDENT IN AN ORGANIZED HEALTH CARE EDUCATION/TRAINING PROGRAM

## 2023-05-11 RX ORDER — METHOCARBAMOL 500 MG/1
500 TABLET, FILM COATED ORAL 4 TIMES DAILY PRN
Qty: 120 TABLET | Refills: 3 | Status: SHIPPED | OUTPATIENT
Start: 2023-05-11 | End: 2023-09-07

## 2023-05-11 ASSESSMENT — PAIN SCALES - GENERAL: PAINLEVEL: WORST PAIN (10)

## 2023-05-11 NOTE — PATIENT INSTRUCTIONS
Procedures:   TPI performed, 1 day of help. No new procedures  Physical Therapy: Continue PT and continue HEP  Diagnostic Studies: MRI Cervical spine ordered  Medication Management:   Continue tylenol  Methocarbamol 500mg up to 4 times daily ordered.   No Flexeril, tizanidine, or baclofen due to known CNS depression/sedation effects, which could result in falls.  Use advil sparingly  Follow up: 3 months    Abilio Tran MD  Interventional Pain Medicine  [facility] Number:  [phone]  Call with any questions about your care and for scheduling assistance.   Calls are returned Monday through Friday between 8 AM and 4:30 PM. We usually get back to you within 2 business days depending on the issue/request.    If we are prescribing your medications:  For opioid medication refills, call the clinic or send a [employer] message 7 days in advance.  Please include:  Name of requested medication  Name of the pharmacy.  For non-opioid medications, call your pharmacy directly to request a refill. Please allow 3-4 days to be processed.   Per [place] State Law:  All controlled substance prescriptions must be filled within 30 days of being written.    For those controlled substances allowing refills, pickup must occur within 30 days of last fill.      We believe regular attendance is key to your success in our program!    Any time you are unable to keep your appointment we ask that you call us at least 24 hours in advance to cancel.This will allow us to offer the appointment time to another patient.   Multiple missed appointments may lead to dismissal from the clinic.

## 2023-05-11 NOTE — PROGRESS NOTES
[facility] Follow-up    Date of visit: 5/11/2023    Chief complaint:   Chief Complaint   Patient presents with     Pain       Interval history:  Since his last visit, Balwinder Huang reports:  TPI helped for 1 day, then wore off  Is having frequent numbness in the left shoulder. No weakness in left hand    Pain scores:  Pain intensity currently is 10 on a scale of 0-10.     Current pain medications include:  Codeine 15mg - helped  Tylenol - helpful  Advil - helping     Previous medication treatments included:  Aleve - helpful     Other treatments have included:  Balwinder Huang has not been seen at a pain clinic in the past.  For right hip  PT: undergoing - doing HEP. Helpful  Injections:    3/29/23 TPI left neck, thoracic paraspinals, rhomboids, levator scapula - helped for 1 day  Surgery: none  Alternative: chiropractor - has been going, helped a little    Side Effects: Stomach pain from medications    Medications:  Current Outpatient Medications   Medication Sig Dispense Refill     clopidogrel (PLAVIX) 75 MG tablet Take 1 tablet (75 mg) by mouth daily 90 tablet 1     codeine 15 MG tablet Take 1 tablet (15 mg) by mouth every 8 hours as needed for severe pain (7-10) 10 tablet 0     multivitamin therapeutic (THERAGRAN) tablet [MULTIVITAMIN THERAPEUTIC (THERAGRAN) TABLET] Take 1 tablet by mouth daily.       nitroGLYcerin (NITROSTAT) 0.4 MG sublingual tablet nitroglycerin 0.4 mg sublingual tablet   PLACE ONE TABLET UNDER TONGUE AS NEEDED FOR CHEST PAIN EVERY 5 MINUTES AS DIRECTED       oxybutynin (DITROPAN XL) 15 MG 24 hr tablet [OXYBUTYNIN (DITROPAN XL) 15 MG 24 HR TABLET] Take 15 mg by mouth daily.       simvastatin (ZOCOR) 20 MG tablet TAKE 1 TABLET(20 MG) BY MOUTH AT BEDTIME 90 tablet 3     tamsulosin (FLOMAX) 0.4 MG capsule TAKE 2 CAPSULES(0.8 MG) BY MOUTH DAILY AFTER BREAKFAST 180 capsule 2     vit C/vit E/lutein/min/omega-3 (OCUVITE ORAL) [VIT C/VIT  E/LUTEIN/MIN/OMEGA-3 (OCUVITE ORAL)] Take 1 tablet by mouth daily.         Medical History: any changes in medical history since they were last seen? No    Physical Exam:  Blood pressure 127/60, pulse 60, SpO2 95 %.  Constitutional: Well developed, NAD  Head: normocephalic. Atraumatic.   Eyes: no redness or jaundice noted   CV: warm, well perfused extremities   Skin: no suspicious lesions or rashes   Psychiatric: mentation appears normal and affect full  Focused MSK exam:   left shoulder examination  Inspection: no obvious anatomic abnormality  Palpation: +tender to palpation of anterior shoulder, nontender at negative lateral, and posterior shoulder  AROM: reduced in fwd flexion, reduced in abduction  PROM: mildly reduced in fwd flexion, mildly reduced in abduction  5/5 strength in shoulder abduction, internal rotation, external rotation  Special tests:  Ramirez: positive  Neer's: positive  Empty can: positive  Speed's: equivocal  Burnett: negative    Assessment:   1. Cervical spondylosis  2. Myofascial pain  3. Left rotator cuff tendinopathy    Balwinder Huang is a 89 year old male who presents for follow up of his left-sided neck pain shoulder pain.  The patient has undergone physical therapy, and states the home exercise programs are helping.  We tried a trigger point injection which helped for 1 day then wore off.  He is currently taking Advil and Tylenol which are both helping.  He says codeine helps as well, which she has a few tablets from a prior prescription.  Examination shows some tenderness to palpation of the left anterior shoulder as well as a positive Ramirez and Neer's test.  Given his left shoulder numbness and tingling, we will obtain an MRI of the cervical spine to evaluate for any radicular cause of his pain.  I have also ordered methocarbamol for him to be taking 500 mg 4 times daily. We will plan to perform a C-ILESI for radicular pain if evidence on MRI is seen, otherwise will perform a left  subacromial bursa injection.     Plan:  Diagnosis reviewed, treatment option addressed, and risk/benefits discussed.      1. Procedures:   1. TPI performed, 1 day of help. No new procedures  2. Physical Therapy: Continue PT and continue HEP  3. Diagnostic Studies: MRI Cervical spine ordered - will decide C-ILESI or L shoulder subacromial injection based on results.  4. Medication Management:   1. Continue tylenol  2. Methocarbamol 500mg up to 4 times daily ordered.   3. No Flexeril, tizanidine, or baclofen due to known CNS depression/sedation effects, which could result in falls.  4. Use advil sparingly  5. Follow up: 3 months    Abilio Tran MD  Interventional Pain Medicine  [facility]

## 2023-05-11 NOTE — PROGRESS NOTES
Patient presents to the clinic today for a follow up with Abilio Tran MD             View : No data to display.                  3/7/2023     1:02 PM 5/11/2023    12:59 PM   PEG Score   PEG Total Score 6.33 5       UDS/CSA-    Medications-    QUESTIONS:    Antonia ZHAO [facility] [place]

## 2023-05-12 ENCOUNTER — TELEPHONE (OUTPATIENT)
Dept: PALLIATIVE MEDICINE | Facility: OTHER | Age: 88
End: 2023-05-12
Payer: COMMERCIAL

## 2023-05-12 NOTE — TELEPHONE ENCOUNTER
[facility] Call Center    Phone Message    May a detailed message be left on voicemail: yes     Reason for Call: Other: Patient's wife Carey called stating she thought the medication issues would be fixed today, before the weekend.   She is requesting a call to discuss her concerns as soon as possible.     Action Taken: Message routed to:  Other: [facility]    Travel Screening: Not Applicable

## 2023-05-12 NOTE — TELEPHONE ENCOUNTER
Please PA Methocarbamol (Robaxin) 500 mg tablets  methocarbamol (ROBAXIN) 500 MG tablet 120 tablet 3 5/11/2023  --   Sig - Route: Take 1 tablet (500 mg) by mouth 4 times daily as needed for muscle spasms - Oral   Sent to pharmacy as: Methocarbamol 500 MG Oral Tablet (ROBAXIN)   Class: E-Prescribe   Order: [id number]   E-Prescribing Status: Receipt confirmed by pharmacy (5/11/2023  1:24 PM CDT)     Norman

## 2023-05-12 NOTE — TELEPHONE ENCOUNTER
Called and spoke with pt's wife to inform insurance has denied Robaxin, and we have sent a PA request. Advised that pt will be notified when a decision has been made.

## 2023-05-12 NOTE — TELEPHONE ENCOUNTER
[facility] Call Center    Phone Message    May a detailed message be left on voicemail: yes     Reason for Call: Medication Question or concern regarding medication   Prescription Clarification  Name of Medication: methocarbamol (ROBAXIN) 500 MG tablet  Prescribing Provider: Marc   Pharmacy: [facility] #[id number] - [address] AT [facility] [address]     What on the order needs clarification?  Insurance will not cover the methocarbamol  And they will need an alternative.           Action Taken: Other: Pain    Travel Screening: Not Applicable

## 2023-05-13 NOTE — TELEPHONE ENCOUNTER
Central Prior Authorization Team   Phone: [phone]    PA Initiation    Medication:   Insurance Company:    Pharmacy Filling the Rx: [facility] #[id number] - [place], [place] - [facility] [address] AT [facility] [address]  Filling Pharmacy Phone: [phone]  Filling Pharmacy Fax: [phone]  Start Date: 5/13/2023

## 2023-05-15 NOTE — PROGRESS NOTES
[facility]    Outpatient Physical Therapy Progress Note  Patient: Balwinder Huang  : 1931    Beginning/End Dates of Reporting Period:  2023 to 5/10/2023    Referring Provider: Miguel Day MD    Therapy Diagnosis: L sided neck pain with L shoulder pain     Client Self Report: Pt reports that he has been experiencing more back/shoulder pain recently. Pt reports that his exercises have been increasing his pain. Pt reports that his pain used to come and go and now it has been constant. Pt reports constant pain in his shoulder. Pt reports that the exercises improved his neck pain, but has been experiencing more shoulder pain        Goals:  Goal Identifier HEP   Goal Description Pt will be independent with HEP to improve mobility and decrease pain.   Target Date 23   Date Met      Progress (detail required for progress note): Progressing     Goal Identifier Sleep   Goal Description Pt will report ability to sleep greater than 4 hours with less than 2/10 neck pain to improve functional mobility and improve quality of life.   Target Date 23   Date Met      Progress (detail required for progress note): Progressing     Goal Identifier Putting on clothes   Goal Description Pt will report ability to put on clothes with less than 2/10 neck pain to improve functional mobility and increase tolerance completing ADL's.   Target Date 23   Date Met      Progress (detail required for progress note): Progressing               Plan:  Continue therapy per current plan of care.    Discharge:  No       05/10/23 1500   Signing Clinician's Name / Credentials   Signing clinician's name / credentials Rachele Richardson, PT, DPT   Session Number   Session Number 3/4   Progress Note/Recertification   Progress Note Due Date 23   Progress Note Completed Date 05/10/23   Recertification Due Date 23   Adult  Goals   PT Ortho Eval Goals 1;2;3   Ortho Goal 1   Goal Identifier HEP   Goal Description Pt will be independent with HEP to improve mobility and decrease pain.   Goal Progress Progressing   Target Date 06/01/23   Ortho Goal 2   Goal Identifier Sleep   Goal Description Pt will report ability to sleep greater than 4 hours with less than 2/10 neck pain to improve functional mobility and improve quality of life.   Goal Progress Progressing   Target Date 06/01/23   Ortho Goal 3   Goal Identifier Putting on clothes   Goal Description Pt will report ability to put on clothes with less than 2/10 neck pain to improve functional mobility and increase tolerance completing ADL's.   Goal Progress Progressing   Target Date 06/01/23   Subjective Report   Subjective Report Pt reports that he has been experiencing more back/shoulder pain recently. Pt reports that his exercises have been increasing his pain. Pt reports that his pain used to come and go and now it has been constant. Pt reports constant pain in his shoulder. Pt reports that the exercises improved his neck pain, but has been experiencing more shoulder pain   Treatment Interventions   Interventions Therapeutic Procedure/Exercise;Manual Therapy   Therapeutic Procedure/exercise   Therapeutic Procedures: strength, endurance, ROM, flexibillity minutes (94951) 15   Skilled Intervention Education on exercises for HEP. Verbal cues, tactile cues, and demonstration of correct techniques. Pt educated to complete exercises within pain. Pt educated to stop exercises if cause increase in pain.   Patient Response Tolerated well   Treatment Detail Review over HEP, removal of painful exercises, pt educated on nerve glides, median nerve rocking, median nerve sliders, radial nerve sliders, radial nerve towel sliders   Manual Therapy   Manual Therapy: Mobilization, MFR, MLD, friction massage minutes (13279) 25   Skilled Intervention STM/massage/manual therapy; pt in supine with head  supported on pillow and bolster under knees, assist with supine<>sit transfers.   Patient Response Tolerated well, when sitting back up pt reports slight improvement in pain   Treatment Detail STM/massage to L upper rhomboid musculature, pt reports tenderness upon palpation, STM/massage of upper trapezius musculature   Education   Learner Patient;Significant Other   Readiness Eager;Acceptance   Method Booklet/handout;Demonstration   Response Verbalizes Understanding;Demonstrates Understanding   Plan   Homework PTRx   Home program Upper trapezius stretch, levator scapulae stretch, rhomboid stretch, supine cervical retraction, cervical retraction, shoulder rolls, scapular retraction/depression, shoulder theraband rows, shoulder theraband lat pulldown, dynamic hug, scapular clocks in sitting, nerve glides   Plan for next session Scapular strengthening and stretching; therapeutic exercise: strengthening, flexibility, ROM; neuro re-ed; STM/massage/manual therapy   Comments   Comments Pt is a 89 year old male who presents to physical therapy with reports of left sided neck pain with L shoulder pain. Pt continues to experience pain throughout the day. Pt would benefit from physical therapy to improve ROM, increase strength and decrease pain to improve functional mobility and increase tolerance completing ADL's, recreational activities and work related tasks.   Total Session Time   Timed Code Treatment Minutes 40   Total Treatment Time (sum of timed and untimed services) 40   Medicare Claim Information   Medical Diagnosis Myofascial pain; cervical spondylosis without myelopathy   PT Diagnosis L sided neck pain with L shoulder pain   Start of Care Date 04/05/23   Onset date of current episode/exacerbation 03/07/23   Certification date from 04/05/23     Rachele Richardson, PT, DPT

## 2023-05-15 NOTE — ADDENDUM NOTE
Encounter addended by: Rachele Richardson, PT on: 5/15/2023 12:14 AM   Actions taken: Clinical Note Signed, Flowsheet accepted

## 2023-05-16 NOTE — TELEPHONE ENCOUNTER
Prior Authorization Approval    Authorization Effective Date: 4/13/2023  Authorization Expiration Date: 5/12/2024  Medication: Methocarbamol - APPROVED  Approved Dose/Quantity:    Reference #:     Insurance Company:    Expected CoPay:       CoPay Card Available:      Foundation Assistance Needed:    Which Pharmacy is filling the prescription (Not needed for infusion/clinic administered): [id number] - [place] [address] [address] AT [facility] [address]  Pharmacy Notified: Yes  Patient Notified: Yes  **Instructed pharmacy to notify patient when script is ready to /ship.**

## 2023-05-17 ENCOUNTER — THERAPY VISIT (OUTPATIENT)
Dept: PHYSICAL THERAPY | Facility: REHABILITATION | Age: 88
End: 2023-05-17
Payer: COMMERCIAL

## 2023-05-17 ENCOUNTER — HOSPITAL ENCOUNTER (OUTPATIENT)
Dept: MRI IMAGING | Facility: HOSPITAL | Age: 88
Discharge: HOME | End: 2023-05-17
Attending: STUDENT IN AN ORGANIZED HEALTH CARE EDUCATION/TRAINING PROGRAM | Admitting: STUDENT IN AN ORGANIZED HEALTH CARE EDUCATION/TRAINING PROGRAM
Payer: COMMERCIAL

## 2023-05-17 DIAGNOSIS — M79.18 MYOFASCIAL PAIN: Primary | ICD-10-CM

## 2023-05-17 DIAGNOSIS — M47.812 CERVICAL SPONDYLOSIS WITHOUT MYELOPATHY: ICD-10-CM

## 2023-05-17 DIAGNOSIS — M79.18 MYOFASCIAL PAIN: ICD-10-CM

## 2023-05-17 DIAGNOSIS — M54.12 CERVICAL RADICULOPATHY: ICD-10-CM

## 2023-05-17 PROCEDURE — 97110 THERAPEUTIC EXERCISES: CPT | Mod: GP

## 2023-05-17 PROCEDURE — 97140 MANUAL THERAPY 1/> REGIONS: CPT | Mod: GP

## 2023-05-17 PROCEDURE — 72141 MRI NECK SPINE W/O DYE: CPT

## 2023-05-21 PROBLEM — M79.18 MYOFASCIAL PAIN: Status: ACTIVE | Noted: 2023-05-21

## 2023-05-21 PROBLEM — M47.812 CERVICAL SPONDYLOSIS WITHOUT MYELOPATHY: Status: ACTIVE | Noted: 2023-05-21

## 2023-05-31 DIAGNOSIS — M54.12 CERVICAL RADICULOPATHY: Primary | ICD-10-CM

## 2023-06-05 ENCOUNTER — TELEPHONE (OUTPATIENT)
Dept: PALLIATIVE MEDICINE | Facility: OTHER | Age: 88
End: 2023-06-05
Payer: COMMERCIAL

## 2023-06-05 DIAGNOSIS — M54.12 CERVICAL RADICULOPATHY: Primary | ICD-10-CM

## 2023-06-05 NOTE — TELEPHONE ENCOUNTER
Screening Questions for Radiology Injections:    Injection to be done at which interventional clinic site? [facility]    Procedure ordered by Marc    Procedure ordered? Cervical ILESI    Transforaminal Cervical GERARD - Send to [facility] - No [facility] Site providers perform this procedure    What insurance would patient like us to bill for this procedure? ucare    IF SCHEDULING IN [place] PAIN OR SPINE PLEASE SCHEDULE AT LEAST 7-10 BUSINESS DAYS OUT SO A PA CAN BE OBTAINED    Worker's comp or MVA (motor vehicle accident) -Any injection DO NOT SCHEDULE and route to Rosalee De La Torre.      [employer] insurance - For SI joint injections, DO NOT SCHEDULE and route to Eloisa Panchal.       ALL BCBS, [employer] and [employer] CIGNA - DO NOT SCHEDULE and route to Eloisa Panchal    MEDICA- facet joint injections, route to Eloisa Panchal    Is patient scheduled at [facility]? no   If YES, route every encounter to [facility] SPINE CENTER CARE NAVIGATION POOL [[phone]]    Is an  needed? No     Patient has a  home? (Review Grid) YES: ok    Any chance of pregnancy? NO   If YES, do NOT schedule and route to RN pool  - Dr. Thornton route to Ira Godfrey and PM&R Nurse  [[id number]]      Is patient actively being treated for cancer or immunocompromised? No  If YES, do NOT schedule and route to RN pool/ Dr. Thornton's Team    Does the patient have a bleeding or clotting disorder? No     If YES, okay to schedule AND route to RN nurse pool/ Dr. Thornton's Team     (For any patients with platelet count <100, RN must forward to provider)    Is patient taking any Blood Thinners OR Antiplatelet medication?  No   If hold needed, do NOT schedule, route to RN pool/ Dr. Thornton's Team    Examples:   o Blood Thinners: (Coumadin, Warfarin, Jantoven, Pradaxa, Xarelto, Eliquis, Edoxaban, Enoxaparin, Lovenox, Heparin, Arixtra, Fondaparinux or Fragmin)  o Antiplatelet Medications: (Plavix, Brilinta or Effient)     Is patient taking any  aspirin products (includes Excedrin and Fiorinal)? No     If more than 325mg/day, OK to schedule; Instruct Pt to decrease to less than 325 mg for 7 days AND route to RN pool/ Dr. Thornton's Team     For CERVICAL procedures, hold all aspirin products for 6 days.     Tell Pt that if aspirin product is not held for 6 days, the procedure WILL BE cancelled.     Any allergies to contrast dye, iodine, shellfish, or numbing and steroid medications? No    If YES, schedule and add allergy information to appointment notes AND route to the RN pool/ Dr. Thornton's Team    If GERARD and Contrast Dye / Iodine Allergy? DO NOT SCHEDULE, route to RN pool/ Dr. Thornton's Team    Allergies: Shellfish containing products [shellfish-derived products] and Ampicillin     Does patient have an active infection or treated for one within the past week? No    Is patient currently taking any antibiotics or steroid medications?  No     For patients on chronic, preventative, or prophylactic antibiotics, procedures may be scheduled.     For patients on antibiotics for active or recent infection, schedule 4 days after completed.    For patients on steroid medications, schedule 4 days after completed.     Has the patient had a flu shot or any other vaccinations within the past 7 days? No  If yes, explain that for the vaccine to work best they need to:       wait 1 week before and 1 week after getting any Vaccine    wait 1 week before and 2 weeks after getting Covid Vaccine #2 or BOOSTER    If patient has concerns about the timing, send to RN pool/ Dr. Thornton's Team    Does patient have an MRI/CT?  YES: MRI Include Date and Check Procedure Scheduling Grid to see if required.    Was the MRI/CT done within the last 3 years?  Yes     If no route to RN Pool/ Dr. Vieras Team    If yes, where was the MRI/CT done? [facility]     Refer to PACS Transmissions list for approved external locations and route to RN Pool High Priority/ Dr. Vieras Team    If MRI was not  done at approved external location do NOT schedule and route to RN pool/ Dr. Thornton's Team      If patient has an imaging disc, the injection MAY be scheduled but patient must bring disc to appt or appt will be cancelled.    Is patient able to transfer to a procedure table with minimal or no assistance? Yes     If no, do NOT schedule and route to RN Pool/ Dr. Thornton's Team    Procedure Specific Instructions:    If celiac plexus block, informed patient NPO for 6 hours and that it is okay to take medications with sips of water, especially blood pressure medications Not Applicable         If this is for a cervical procedure, informed patient that aspirin needs to be held for 6 days.   NO      Sedation, If Sedation is ordered for any procedure, patient must be NPO for 6 hours prior to procedure Not Applicable      If IV needed:    Do not schedule procedures requiring IV placement in the first appointment of the day or first appointment after lunch. Do NOT schedule at 0745, 0815 or 1245. ok    Instructed patient to arrive 30 minutes early for IV start if required. (Check Procedure Scheduling Grid)  YES: ok    Reminders:    If you are started on any steroids or antibiotics between now and your appointment, you must contact us because the procedure may need to be cancelled.  Yes      As a reminder, receiving steroids can decrease your body's ability to fight infection.   Would you still like to move forward with scheduling the injection?  Yes      IV Sedation is not provided for procedures. If oral anti-anxiety medication is needed, the patient should request this from their referring provider.      Instruct patient to arrive as directed prior to the scheduled appointment time:  If IV needed 30 minutes before appointment time       For patients 85 or older we recommend having an adult stay w/ them for the remainder of the day.       If the patient is Diabetic, remind them to bring their glucometer.      Does the patient  have any questions?  NO  Keisha De La Torre  [facility] [place]

## 2023-06-20 ENCOUNTER — TELEPHONE (OUTPATIENT)
Dept: PALLIATIVE MEDICINE | Facility: OTHER | Age: 88
End: 2023-06-20

## 2023-06-20 NOTE — TELEPHONE ENCOUNTER
"Called and spoke to patient to remind them of procedure for the next day:    from [facility] in [place],       at (arrival time) 08.15am.     At this location \"[address] in [place]\"    There are no restrictions as far as what you can eat and drink     (unless they are having an IV- then 6 hours NPO, accept for sips of water for meds)     Do they have a .yes (No  needed for knee)    Have you had any vaccines in the last two weeks? no (If yes transfer to nurse via CS)    Have you been using any antibiotics in the last week? no (If yes transfer to nurse via CS)    Have you been running a fever? no (If yes transfer to nurse via CS)    Central Scheduling [phone]      "

## 2023-06-21 ENCOUNTER — RADIOLOGY INJECTION OFFICE VISIT (OUTPATIENT)
Dept: PALLIATIVE MEDICINE | Facility: OTHER | Age: 88
End: 2023-06-21
Attending: STUDENT IN AN ORGANIZED HEALTH CARE EDUCATION/TRAINING PROGRAM
Payer: COMMERCIAL

## 2023-06-21 VITALS — DIASTOLIC BLOOD PRESSURE: 64 MMHG | HEART RATE: 78 BPM | OXYGEN SATURATION: 94 % | SYSTOLIC BLOOD PRESSURE: 136 MMHG

## 2023-06-21 DIAGNOSIS — M54.12 CERVICAL RADICULOPATHY: ICD-10-CM

## 2023-06-21 ASSESSMENT — PAIN SCALES - GENERAL: PAINLEVEL: SEVERE PAIN (6)

## 2023-06-21 NOTE — PROGRESS NOTES
Procedure was not performed today due to lack of clopidogrel hold, will attempt to reschedule and coordinate hold for 7 days.

## 2023-06-21 NOTE — TELEPHONE ENCOUNTER
Pt needs to check with his cardiologist about stopping the plavix for the injection- he follows with Audrey.

## 2023-06-21 NOTE — PROGRESS NOTES
Pre-procedure Intake  If YES to any questions or NO to having a   Please complete laminated checklist and leave on the computer keyboard for Provider, verbally inform provider if able.    For SCS Trial, RFA's or any sedation procedure:  Have you been fasting? NA    If yes, for how long? na    Are you taking any any blood thinners such as Coumadin, Warfarin, Jantoven, Pradaxa Xarelto, Eliquis, Edoxaban, Enoxaparin, Lovenox, Heparin, Arixtra, Fondaparinux, or Fragmin? OR Antiplatelet medication such as Plavix, Brilinta, or Effient?   Yes -   Plavix     If yes, when did you take your last dose? Yesterday morning    Do you take aspirin?  No    If cervical procedure, have you held aspirin for 6 days?   NA    Do you have any allergies to contrast dye, iodine, steroid and/or numbing medications?  NO patient reports a shrimp allergy    Are you currently taking antibiotics or have an active infection?  NO    Have you had a fever/elevated temperature within the past week? NO    Are you currently taking oral steroids? NO    Do you have a ? Yes    Are you pregnant or breastfeeding?  Not Applicable    Have you received the COVID-19 vaccine? Yes    If yes, was it your 1st, 2nd or only dose needed? 5th    Date of most recent vaccine: 10.25.2022    Notify provider and RNs if systolic BP >170, diastolic BP >100, P >100 or O2 sats < 90%

## 2023-06-21 NOTE — CONFIDENTIAL NOTE
Patient unable to have todays scheduled procedure completed due to: did not stop plavix  (patient reports he was asked if he stopped plavix but because he is used to the generic name of clopidogril he answered that he does not take plavix)    Per provider patient is to be off plavix x 7 days    Will route message to reschedule patient a minimum of 7 days from today  Once scheduled will route to plavix prescriber to ensure it is ok to have patient stop plavix x 7 days.  Spoke with patient at the scheduling desk and he is aware that he will need to be off plavix for a minimum of 7 days prior to the procedure reschedule date.

## 2023-06-22 NOTE — TELEPHONE ENCOUNTER
Call placed to patient:  Notified the patient that primary care has recommended that he review the plavix hold with cardiology  Patient will contact the cardiologist and ask for them to contact the pain clinic to review whether or not it is appropriate to hold plavix for the upcoming procedure that has been rescheduled:7/6/23  Our protocol is for a 7 day hold of this medication so the deadline to hear back from cardiology will be:6/28/23

## 2023-06-23 NOTE — TELEPHONE ENCOUNTER
Per cardiology: ok to hold plavix x 7 days per protocol and can restart 12 hours post procedure.  (documentation is available in the chart)

## 2023-06-24 DIAGNOSIS — I10 ESSENTIAL HYPERTENSION: ICD-10-CM

## 2023-06-25 RX ORDER — ATENOLOL 25 MG/1
TABLET ORAL
Qty: 90 TABLET | Refills: 3 | OUTPATIENT
Start: 2023-06-25

## 2023-06-26 NOTE — TELEPHONE ENCOUNTER
Apt: 7/6/23  Patient to stop plavix by 6/30/23-restart 12 hours post procedure  RN to contact patient

## 2023-06-27 NOTE — TELEPHONE ENCOUNTER
Call placed to patient:  Advised that he stop plavix as of 6/30/23  Reminded of apt on 7/6/23  Patient agrees to this plan and reports he has all of this on his calendar.  Encouraged patient to call back with any further questions

## 2023-06-30 ENCOUNTER — TELEPHONE (OUTPATIENT)
Dept: PALLIATIVE MEDICINE | Facility: OTHER | Age: 88
End: 2023-06-30

## 2023-06-30 DIAGNOSIS — M54.12 CERVICAL RADICULOPATHY: Primary | ICD-10-CM

## 2023-07-05 ENCOUNTER — TELEPHONE (OUTPATIENT)
Dept: PALLIATIVE MEDICINE | Facility: OTHER | Age: 88
End: 2023-07-05

## 2023-07-05 NOTE — TELEPHONE ENCOUNTER
Called and left a message for patient to remind them of procedure for the next day:    This is... from CECE Ruiz. I am calling to remind you of your appointment tomorrow.    Please call and reschedule if you are running a fever, are taking blood thinners,have had a vaccination in the last two weeks or are taking an antibiotic for a current infection or if you do not have a .    Sent a iogyn message as well with reminder to hold plavix at least 12 hours as was previously stated by the nurse.          If you have any questions please call [phone]     Yes

## 2023-07-06 ENCOUNTER — RADIOLOGY INJECTION OFFICE VISIT (OUTPATIENT)
Dept: PALLIATIVE MEDICINE | Facility: OTHER | Age: 88
End: 2023-07-06
Payer: COMMERCIAL

## 2023-07-06 VITALS — DIASTOLIC BLOOD PRESSURE: 63 MMHG | HEART RATE: 98 BPM | SYSTOLIC BLOOD PRESSURE: 129 MMHG | OXYGEN SATURATION: 94 %

## 2023-07-06 DIAGNOSIS — M54.12 CERVICAL RADICULOPATHY: ICD-10-CM

## 2023-07-06 PROCEDURE — 62321 NJX INTERLAMINAR CRV/THRC: CPT | Performed by: STUDENT IN AN ORGANIZED HEALTH CARE EDUCATION/TRAINING PROGRAM

## 2023-07-06 PROCEDURE — 250N000011 HC RX IP 250 OP 636: Mod: JZ | Performed by: STUDENT IN AN ORGANIZED HEALTH CARE EDUCATION/TRAINING PROGRAM

## 2023-07-06 PROCEDURE — 255N000002 HC RX 255 OP 636: Performed by: STUDENT IN AN ORGANIZED HEALTH CARE EDUCATION/TRAINING PROGRAM

## 2023-07-06 RX ORDER — DEXAMETHASONE SODIUM PHOSPHATE 10 MG/ML
10 INJECTION INTRAMUSCULAR; INTRAVENOUS ONCE
Status: COMPLETED | OUTPATIENT
Start: 2023-07-06 | End: 2023-07-06

## 2023-07-06 RX ADMIN — IOHEXOL 10 ML: 180 INJECTION INTRAVENOUS at 09:40

## 2023-07-06 RX ADMIN — DEXAMETHASONE SODIUM PHOSPHATE 10 MG: 10 INJECTION INTRAMUSCULAR; INTRAVENOUS at 09:17

## 2023-07-06 ASSESSMENT — PAIN SCALES - GENERAL
PAINLEVEL: SEVERE PAIN (7)
PAINLEVEL: MODERATE PAIN (5)

## 2023-07-06 NOTE — PATIENT INSTRUCTIONS
[facility]   Procedure Discharge Instructions    Today you saw:  Dr. Tran          You had an:  Cervical epidural steroid injection      Medications used:  Lidocaine  Dexamethasone Omnipaque  Normal saline        If you have received sedation before, during, or after your procedure, for the next 24 hours you shall NOT:   -Drive  -Operate machinery  -Drink alcohol  -Sign any legal documents      If you were holding your blood thinning medication, please restart taking it: Plavix, take next dose tonight or tomorrow  Be cautious when walking. Numbness and/or weakness in the lower extremities may occur for up to 6-8 hours after the procedure due to effect of the local anesthetic  Do not drive for 6 hours. The effect of the local anesthetic could slow your reflexes.   You may resume your regular activities after 24 hours  Avoid strenuous activity for the first 24 hours  You may shower, however avoid swimming, tub baths or hot tubs for 24 hours following your procedure  You may have a mild to moderate increase in pain for several days following the injection.  It may take up to 14 days for the steroid medication to start working although you may feel the effect as early as a few days after the procedure.     You may use ice packs for 10-15 minutes, 3 to 4 times a day at the injection site for comfort  Do not use heat to painful areas for 6 to 8 hours. This will give the local anesthetic time to wear off and prevent you from accidentally burning your skin.   Unless you have been directed to avoid the use of anti-inflammatory medications (NSAIDS), you may use medications such as ibuprofen, Aleve or Tylenol for pain control if needed.   If you were fasting, you may resume your normal diet and medications after the procedure  If you have diabetes, check your blood sugar more frequently than usual as your blood sugar may be higher than normal for 10-14 days following a steroid injection. Contact  your doctor who manages your diabetes if your blood sugar is higher than usual  Possible side effects of steroids that you may experience include flushing, elevated blood pressure, increased appetite, mild headaches and restlessness.  All of these symptoms will get better with time.  If you experience any of the following, call the Pain Clinic during work hours (Mon-Friday 8-4:30 pm) at [phone] or the Provider Line after hours at [phone]:  -Fever over 100 degree F  -Swelling, bleeding, redness, drainage, warmth at the injection site  -Progressive weakness or numbness in your legs or arms  -Loss of bowel or bladder function  -Unusual headache that is not relieved by Tylenol or other pain reliever  -Unusual new onset of pain that is not improving

## 2023-07-06 NOTE — PROGRESS NOTES
Pre procedure Diagnosis: Cervical Radiculopathy   Post procedure Diagnosis: Same  Procedure performed: C7-T1 interlaminar epidural steroid injection, CPT code 76914  Anesthesia: none  Complications: none immediately noted  Operators: Abilio Tran MD    Indications:   Balwinder Huang is a 89 year old male was sent by myself for a cervical epidural steroid injection. During the consent process, this procedure was determined to be an appropriate intervention for the patient's symptoms.    Options/alternatives, benefits and risks were discussed with the patient including bleeding, infection, no pain relief, tissue trauma, exposure to radiation, reaction to medications, spinal cord injury, dural puncture, weakness, numbness and headache.  Questions were answered to his satisfaction and he agrees to proceed. Voluntary informed consent was obtained and signed.     Vitals were reviewed: Yes  Allergies were reviewed:  Yes   Medications were reviewed:  Yes   Pre-procedure pain score: 7/10    Procedure:  After getting informed consent, patient was brought into the procedure suite and was placed in a prone position on the procedure table.   A procedural pause was performed.  Patient was prepped and draped in sterile fashion.     The C7-T1 interspace was identified with AP fluoroscopy.  A total of 4ml of 1% lidocaine was used to anesthetize the skin for a left paramedian approach.    A 22gauge 3.5inch Touhy needle was advanced under intermittent fluoroscopy.  A LUNA syringe was used to advance the needle into the epidural space.   After loss of resistance, there was no evidence of blood or CSF on aspiration. Location was verified with both AP and either contralateral oblique or lateral fluoroscopic imaging.    A total of 1.0ml of Omnipaque 180 was injected demonstrating appropriate epidural spread and was checked in both the AP and lateral views. There was no evidence of intravascular or intrathecal spread.    A mixture of 10mg  of dexamethasone and 2ml of preservative free saline was injected.  The needle was removed from the epidural space and fully removed from the skin.     Hemostasis was achieved, the area was cleaned, and bandaids were placed when appropriate.  The patient tolerated the procedure well, and was taken to the recovery room.    Images were saved to PACS.    Post-procedure pain score: 5/10  Follow-up includes:   -f/u with referring provider    Abilio Tran MD  Interventional Pain Medicine  [facility]

## 2023-07-06 NOTE — PROGRESS NOTES
Pre-procedure Intake  If YES to any questions or NO to having a   Please complete laminated checklist and leave on the computer keyboard for Provider, verbally inform provider if able.    For SCS Trial, RFA's or any sedation procedure:  Have you been fasting? NA    If yes, for how long? na    Are you taking any any blood thinners such as Coumadin, Warfarin, Jantoven, Pradaxa Xarelto, Eliquis, Edoxaban, Enoxaparin, Lovenox, Heparin, Arixtra, Fondaparinux, or Fragmin? OR Antiplatelet medication such as Plavix, Brilinta, or Effient?   Yes -   Plavix     If yes, when did you take your last dose?     Do you take aspirin?  No    If cervical procedure, have you held aspirin for 6 days?   NA    Do you have any allergies to contrast dye, iodine, steroid and/or numbing medications?  NO    Are you currently taking antibiotics or have an active infection?  NO    Have you had a fever/elevated temperature within the past week? NO    Are you currently taking oral steroids? NO    Do you have a ? Yes    Are you pregnant or breastfeeding?  Not Applicable    Have you received the COVID-19 vaccine? Yes    If yes, was it your 1st, 2nd or only dose needed? 5th    Date of most recent vaccine: 10.25.2022    Notify provider and RNs if systolic BP >170, diastolic BP >100, P >100 or O2 sats < 90%

## 2023-08-07 ENCOUNTER — TRANSFERRED RECORDS (OUTPATIENT)
Dept: HEALTH INFORMATION MANAGEMENT | Facility: CLINIC | Age: 88
End: 2023-08-07

## 2023-08-17 ENCOUNTER — TELEPHONE (OUTPATIENT)
Dept: PALLIATIVE MEDICINE | Facility: OTHER | Age: 88
End: 2023-08-17

## 2023-08-17 ENCOUNTER — OFFICE VISIT (OUTPATIENT)
Dept: PALLIATIVE MEDICINE | Facility: OTHER | Age: 88
End: 2023-08-17
Payer: COMMERCIAL

## 2023-08-17 VITALS
BODY MASS INDEX: 26.36 KG/M2 | WEIGHT: 156 LBS | OXYGEN SATURATION: 94 % | HEART RATE: 89 BPM | SYSTOLIC BLOOD PRESSURE: 133 MMHG | DIASTOLIC BLOOD PRESSURE: 70 MMHG

## 2023-08-17 DIAGNOSIS — M67.912 TENDINOPATHY OF LEFT ROTATOR CUFF: Primary | ICD-10-CM

## 2023-08-17 PROCEDURE — 99214 OFFICE O/P EST MOD 30 MIN: CPT | Performed by: STUDENT IN AN ORGANIZED HEALTH CARE EDUCATION/TRAINING PROGRAM

## 2023-08-17 PROCEDURE — G0463 HOSPITAL OUTPT CLINIC VISIT: HCPCS | Performed by: STUDENT IN AN ORGANIZED HEALTH CARE EDUCATION/TRAINING PROGRAM

## 2023-08-17 ASSESSMENT — PAIN SCALES - GENERAL: PAINLEVEL: MILD PAIN (3)

## 2023-08-17 NOTE — PROGRESS NOTES
[facility] Follow-up    Date of visit: 8/17/2023    Chief complaint:   Chief Complaint   Patient presents with    Pain    Pain Management       Interval history:  Since his last visit, Balwinder Huang reports:  70% relief with C-ILESI  Left shoulder pain ongoing, is the primary pain presently    Pain scores:  Pain intensity currently is 3 on a scale of 0-10.     Current pain treatments:   Methocarbamol 500mg QID PRN - didn't help for the neck, unsure for shoulder  Codeine 15mg - helped  Tylenol - helpful  Advil - helping    Other relevant meds:  Plavix     Previous medication treatments included:  Aleve - helpful     Other treatments have included:  Balwinder Huang has not been seen at a pain clinic in the past.  For right hip  PT: undergoing - doing HEP. Helpful  Injections:    7/6//23 - C-ILESI - 70% relief, ongoing              3/29/23 TPI left neck, thoracic paraspinals, rhomboids, levator scapula - helped for 1 day  Surgery: none  Alternative: chiropractor - has been going, helped a little    Side Effects: no side effect    Medications:  Current Outpatient Medications   Medication Sig Dispense Refill    clopidogrel (PLAVIX) 75 MG tablet Take 1 tablet (75 mg) by mouth daily 90 tablet 1    multivitamin therapeutic (THERAGRAN) tablet [MULTIVITAMIN THERAPEUTIC (THERAGRAN) TABLET] Take 1 tablet by mouth daily.      nitroGLYcerin (NITROSTAT) 0.4 MG sublingual tablet nitroglycerin 0.4 mg sublingual tablet   PLACE ONE TABLET UNDER TONGUE AS NEEDED FOR CHEST PAIN EVERY 5 MINUTES AS DIRECTED      oxybutynin (DITROPAN XL) 15 MG 24 hr tablet [OXYBUTYNIN (DITROPAN XL) 15 MG 24 HR TABLET] Take 15 mg by mouth daily.      simvastatin (ZOCOR) 20 MG tablet TAKE 1 TABLET(20 MG) BY MOUTH AT BEDTIME 90 tablet 3    tamsulosin (FLOMAX) 0.4 MG capsule TAKE 2 CAPSULES(0.8 MG) BY MOUTH DAILY AFTER BREAKFAST 180 capsule 2    vit C/vit E/lutein/min/omega-3 (OCUVITE ORAL) [VIT C/VIT  E/LUTEIN/MIN/OMEGA-3 (OCUVITE ORAL)] Take 1 tablet by mouth daily.      codeine 15 MG tablet Take 1 tablet (15 mg) by mouth every 8 hours as needed for severe pain (7-10) 10 tablet 0    methocarbamol (ROBAXIN) 500 MG tablet Take 1 tablet (500 mg) by mouth 4 times daily as needed for muscle spasms 120 tablet 3       Medical History: any changes in medical history since they were last seen? No    Physical Exam:  Blood pressure 133/70, pulse 89, weight 70.8 kg (156 lb), SpO2 94 %.  Constitutional: Well developed, NAD  Head: normocephalic. Atraumatic.   Eyes: no redness or jaundice noted   CV: warm, well perfused extremities   Skin: no suspicious lesions or rashes   Psychiatric: mentation appears normal and affect full    Diagnostics:  EXAM: MR CERVICAL SPINE W/O CONTRAST  LOCATION: [facility]  DATE/TIME: 5/17/2023 6:23 PM CDT     INDICATION: Suspect left C4 radiculopathy; Neck pain; Neurologic deficit, nontraumatic; None of the following: Babinski clonus, balance gait abnormality, Sidhu's sign, hyperreflexia, or UE weakness  COMPARISON: None.  TECHNIQUE: MRI Cervical Spine without IV contrast.     FINDINGS: Mild motion artifact. Alignment is significant for reversal of the normal cervical lordosis and multilevel grade 1 spondylolisthesis which is worst at C4-C5 and C7-T1 where there is grade 1 anterolisthesis of C4 on C5 and C7 on T1. Bone marrow   demonstrates scattered mixed degenerative endplate changes worst at C5-C6 and C6-C7. No high-grade marrow edema. No convincing cord signal abnormality on motion limited assessment. No convincing extraspinal abnormality. Small-volume fluid signal within   the bilateral mastoid cavities.     Craniovertebral junction and C1-C2: Degenerative change without high-grade stenosis.     C2-C3: Mild disc height loss. Disc osteophyte complex. Severe bilateral facet arthropathy with probable bilateral facet joint fusion. No foraminal or spinal canal stenosis.       C3-C4: Mild disc height loss. Disc osteophyte complex. Severe bilateral facet arthropathy. Moderate right foraminal stenosis. Severe left foraminal stenosis. Mild spinal canal stenosis.      C4-C5: Moderate disc height loss. Disc osteophyte complex. Moderate right and severe left facet arthropathy. No right foraminal stenosis. Severe left foraminal stenosis. Mild spinal canal stenosis.      C5-C6: Severe disc height loss. Disc osteophyte complex. Moderate bilateral facet arthropathy. Severe right and moderate to severe left foraminal stenosis. Mild spinal canal stenosis.      C6-C7: Severe disc height loss. Disc osteophyte complex. Moderate bilateral facet arthropathy. Moderate right and severe left foraminal stenosis. Mild spinal canal stenosis.      C7-T1: Mild disc height loss. Severe bilateral facet arthropathy. Moderate bilateral foraminal stenoses. Mild spinal canal stenosis.                                                                      IMPRESSION: Severe degenerative change with multiple stenoses as detailed.      Personally reviewed: yes    Assessment:   Cervical spondylosis  Myofascial pain  Left rotator cuff tendinopathy     Balwinder Huang is a 89 year old male who presents for follow up of his left-sided neck pain shoulder pain.  The patient has undergone physical therapy, and states the home exercise programs are helping.  We tried a trigger point injection which helped for 1 day then wore off.  We subsequently obtained an MRI and performed C-ILESI which helped for 70% of his pain. His current primary issue is the left shoulder pain. We previously have examined this and there were multiple examination maneuvers localizing to the left rotator cuff, specifically the supraspinatus muscle.  We discussed a procedural stepwise approach, starting with a left acromial bursa injection.  We would progress to a left suprascapular nerve block and possibly peripheral nerve stimulation if this does not  improve from the initial injections.  I have also asked the patient to resume the methocarbamol and focus on its effect on the left shoulder to see if there is any benefit.  We will see him back in 3 months to reevaluate.  However we [facility] to the results from the left subacromial bursa injection to determine next steps.     Plan:  Diagnosis reviewed, treatment option addressed, and risk/benefits discussed.      Procedures:   Left Subacromial bursa injection ordered  Left suprascapular nerve block and/or PNS can be considered for the future  C-ILESI can be repeated beyond 3 month suhail from prior once it wears off  Physical Therapy: Continue PT and continue HEP  Diagnostic Studies: MRI Cervical spine reviewed  Medication Management:   Continue tylenol  Continue methocarbamol 500mg up to 4 times a day as needed    No Flexeril, tizanidine, or baclofen due to known CNS depression/sedation effects, which could result in falls.  Use advil sparingly  Follow up: 3 months     Abilio Tran MD  Interventional Pain Medicine  [facility]         no

## 2023-08-17 NOTE — PATIENT INSTRUCTIONS
Procedures:   Left Subacromial bursa injection ordered  Left suprascapular nerve block and/or PNS can be considered for the future  C-ILESI can be repeated beyond 3 month suhail from prior once it wears off  Physical Therapy: Continue PT and continue HEP  Diagnostic Studies: MRI Cervical spine reviewed  Medication Management:   Continue tylenol  Continue methocarbamol 500mg up to 4 times a day as needed    No Flexeril, tizanidine, or baclofen due to known CNS depression/sedation effects, which could result in falls.  Use advil sparingly  Follow up: 3 months    Abilio Tran MD  Interventional Pain Medicine  [facility] Number:  [phone]  Call with any questions about your care and for scheduling assistance.   Calls are returned Monday through Friday between 8 AM and 4:30 PM. We usually get back to you within 2 business days depending on the issue/request.    If we are prescribing your medications:  For opioid medication refills, call the clinic or send a [employer] message 7 days in advance.  Please include:  Name of requested medication  Name of the pharmacy.  For non-opioid medications, call your pharmacy directly to request a refill. Please allow 3-4 days to be processed.   Per [place] State Law:  All controlled substance prescriptions must be filled within 30 days of being written.    For those controlled substances allowing refills, pickup must occur within 30 days of last fill.      We believe regular attendance is key to your success in our program!    Any time you are unable to keep your appointment we ask that you call us at least 24 hours in advance to cancel.This will allow us to offer the appointment time to another patient.   Multiple missed appointments may lead to dismissal from the clinic.

## 2023-08-17 NOTE — PROGRESS NOTES
Patient presents to the clinic today for a visit  with Abilio Tran MD            3/7/2023     1:02 PM 5/11/2023    12:59 PM 8/17/2023     1:09 PM   PEG Score   PEG Total Score 6.33 5 2.67       UDS/CSA-na    Medications-na    QUESTIONS:    Antonia Melvin MA  [facility] [place]

## 2023-09-07 ENCOUNTER — OFFICE VISIT (OUTPATIENT)
Dept: PALLIATIVE MEDICINE | Facility: OTHER | Age: 88
End: 2023-09-07
Attending: STUDENT IN AN ORGANIZED HEALTH CARE EDUCATION/TRAINING PROGRAM
Payer: COMMERCIAL

## 2023-09-07 VITALS — HEART RATE: 92 BPM | DIASTOLIC BLOOD PRESSURE: 56 MMHG | SYSTOLIC BLOOD PRESSURE: 107 MMHG | OXYGEN SATURATION: 97 %

## 2023-09-07 DIAGNOSIS — M67.912 TENDINOPATHY OF LEFT ROTATOR CUFF: Primary | ICD-10-CM

## 2023-09-07 PROCEDURE — 20610 DRAIN/INJ JOINT/BURSA W/O US: CPT | Performed by: STUDENT IN AN ORGANIZED HEALTH CARE EDUCATION/TRAINING PROGRAM

## 2023-09-07 PROCEDURE — 96372 THER/PROPH/DIAG INJ SC/IM: CPT | Performed by: STUDENT IN AN ORGANIZED HEALTH CARE EDUCATION/TRAINING PROGRAM

## 2023-09-07 PROCEDURE — 20610 DRAIN/INJ JOINT/BURSA W/O US: CPT | Mod: LT | Performed by: STUDENT IN AN ORGANIZED HEALTH CARE EDUCATION/TRAINING PROGRAM

## 2023-09-07 PROCEDURE — 250N000011 HC RX IP 250 OP 636: Mod: JW | Performed by: STUDENT IN AN ORGANIZED HEALTH CARE EDUCATION/TRAINING PROGRAM

## 2023-09-07 RX ORDER — ROPIVACAINE HYDROCHLORIDE 5 MG/ML
4 INJECTION, SOLUTION EPIDURAL; INFILTRATION; PERINEURAL ONCE
Status: COMPLETED | OUTPATIENT
Start: 2023-09-07 | End: 2023-09-07

## 2023-09-07 RX ORDER — TRIAMCINOLONE ACETONIDE 40 MG/ML
40 INJECTION, SUSPENSION INTRA-ARTICULAR; INTRAMUSCULAR ONCE
Status: COMPLETED | OUTPATIENT
Start: 2023-09-07 | End: 2023-09-07

## 2023-09-07 RX ADMIN — TRIAMCINOLONE ACETONIDE 40 MG: 40 INJECTION, SUSPENSION INTRA-ARTICULAR; INTRAMUSCULAR at 12:10

## 2023-09-07 RX ADMIN — ROPIVACAINE HYDROCHLORIDE 4 ML: 5 INJECTION EPIDURAL; INFILTRATION; PERINEURAL at 12:10

## 2023-09-07 ASSESSMENT — PAIN SCALES - GENERAL
PAINLEVEL: MODERATE PAIN (4)
PAINLEVEL: MILD PAIN (2)

## 2023-09-07 NOTE — PROGRESS NOTES
Pre-procedure Intake  If YES to any questions or NO to having a   Please complete laminated checklist and leave on the computer keyboard for Provider, verbally inform provider if able.    For SCS Trial, RFA's or any sedation procedure:  Have you been fasting? NA  If yes, for how long? na    Are you taking any any blood thinners such as Coumadin, Warfarin, Jantoven, Pradaxa Xarelto, Eliquis, Edoxaban, Enoxaparin, Lovenox, Heparin, Arixtra, Fondaparinux, or Fragmin? OR Antiplatelet medication such as Plavix, Brilinta, or Effient?   Yes -   Plavix   If yes, when did you take your last dose? Five days ago    Do you take aspirin?  No  If cervical procedure, have you held aspirin for 6 days?   NA    Do you have any allergies to contrast dye, iodine, steroid and/or numbing medications?  NO    Are you currently taking antibiotics or have an active infection?  NO    Have you had a fever/elevated temperature within the past week? NO    Are you currently taking oral steroids? NO    Do you have a ? Yes    Are you pregnant or breastfeeding?  Not Applicable    Have you received the COVID-19 vaccine? Yes  If yes, was it your 1st, 2nd or only dose needed? 5th  Date of most recent vaccine: 10.25.2022    Notify provider and RNs if systolic BP >170, diastolic BP >100, P >100 or O2 sats < 90%

## 2023-09-07 NOTE — PATIENT INSTRUCTIONS
[facility]  Post Procedure Instructions    Today you saw:  Dr. Tran    Today you had:    bursa injection                           Medications used: Ropivacaine Kenalog      Go to the emergency room if you develop any shortness of breath  Monitor the injection sites for signs and symptoms of infection-fever, chills, redness, swelling, warmth, or drainage to areas.  You may have soreness at injection sites for up to 24 hours.  It may take up to 14 days for the steroid medication to start working although you may feel the effect as early as a few days after the procedure.     You may apply ice to the painful areas to help minimize the discomfort of the needle pokes.  Do not apply heat to sites for at least 12 hours.  You may use anti-inflammatory medications or Tylenol for pain control if necessary    Pain Clinic phone number:  [phone]

## 2023-09-07 NOTE — PROGRESS NOTES
Pre procedure Diagnosis: left shoulder pain  Post procedure Diagnosis: Same  Procedure performed: left subacromial bursa injection  Anesthesia: none  Complications: none  Operators: Abliio Tran MD    Indications:   Balwinder Huang is a 89 year old male was sent by myself for left subacromial bursa injection.      Options/alternatives, benefits and risks were discussed with the patient including bleeding, infection, tissue trauma, reaction to medications including seizure, nerve injury, weakness, and numbness.  Questions were answered to his  satisfaction and she agrees to proceed. Voluntary informed consent was obtained and signed.     Vitals were reviewed: Yes  Allergies were reviewed:  Yes   Medications were reviewed:  Yes   Pre-procedure pain score: 4/10    Procedure:  After getting informed consent, patient was placed on a chair in an upright position. The acromion was palpated posteriorly and a site immediately inferior was noted or marked for needle entry.    The area was then prepped with chlorhexadine in preparation for the procedure to be performed using clean technique.    A 25 gauge 1.5 inch needle was advanced immediately inferior to the posterior acromion. Upon successful entry, a mixture containing 4ml of 0.5% ropivacaine with 40mg of kenalog was injected into the subacromial space. The needle was removed.     Hemostasis was achieved, the area was cleaned, and bandaids were placed when appropriate.  The patient tolerated the procedure well, and was kept in the recovery room.    Post-procedure pain score: 2/10  Follow-up includes:   -f/u with referring provider    Abilio Tran MD  Interventional Pain Medicine  [facility]

## 2023-09-13 ENCOUNTER — OFFICE VISIT (OUTPATIENT)
Dept: FAMILY MEDICINE | Facility: CLINIC | Age: 88
End: 2023-09-13
Payer: COMMERCIAL

## 2023-09-13 VITALS
TEMPERATURE: 97.9 F | DIASTOLIC BLOOD PRESSURE: 67 MMHG | RESPIRATION RATE: 24 BRPM | OXYGEN SATURATION: 96 % | SYSTOLIC BLOOD PRESSURE: 106 MMHG | HEART RATE: 114 BPM

## 2023-09-13 DIAGNOSIS — M25.512 ACUTE PAIN OF LEFT SHOULDER: Primary | ICD-10-CM

## 2023-09-13 PROCEDURE — 99213 OFFICE O/P EST LOW 20 MIN: CPT | Performed by: FAMILY MEDICINE

## 2023-09-13 RX ORDER — VALACYCLOVIR HYDROCHLORIDE 1 G/1
TABLET, FILM COATED ORAL
COMMUNITY
End: 2024-01-30

## 2023-09-13 RX ORDER — OXYCODONE HYDROCHLORIDE 5 MG/1
5 TABLET ORAL EVERY 6 HOURS PRN
Qty: 12 TABLET | Refills: 0 | Status: SHIPPED | OUTPATIENT
Start: 2023-09-13 | End: 2023-09-16

## 2023-09-13 NOTE — PROGRESS NOTES
Assessment:       Acute pain of left shoulder  - oxyCODONE (ROXICODONE) 5 MG tablet  Dispense: 12 tablet; Refill: 0         Plan:     Patient with acute on chronic left shoulder pain.  Possible steroid flare however this seems to be occurring too long after the injection for this to be likely.  There does not appear to be any signs or symptoms of infection.  Recommend he  take 1000 mg of Tylenol 3 times daily.  I did give him a prescription for limited number of oxycodone to use for moderate to severe breakthrough pain.  Advised that this may be sedating and to be very cautious when using this medication.  Also advised that it may take up to 2 weeks for the corticosteroid injection to really start helping.  He does not drive a vehicle.  Patient and his wife are agreeable with this plan.  Follow-up with pain center if shoulder pain is getting worse or not improving over the next couple of days.    MEDICATIONS:   Orders Placed This Encounter   Medications    valACYclovir (VALTREX) 1000 mg tablet    oxyCODONE (ROXICODONE) 5 MG tablet     Sig: Take 1 tablet (5 mg) by mouth every 6 hours as needed for pain or moderate to severe pain     Dispense:  12 tablet     Refill:  0         Subjective:       89 year old male with tendinopathy of the left rotator cuff presents for evaluation of severe left shoulder pain that developed yesterday.  He had a left subacromial bursa steroid injection done at the pain center on 9/7/2023 of his left shoulder.  He has been feeling okay until the pain became significantly worse yesterday.  No known injury.  He has difficulty moving his shoulder but this is not new.  There is no redness warmth.  He has not had a fever.    Patient Active Problem List   Diagnosis    Essential Hypercholesterolemia    Joint Stiffness Of The Hip    Essential hypertension    Coronary Artery Disease    Fainting (Syncope)    Squamous cell carcinoma of skin, unspecified    Primary osteoarthritis of left knee     Hyperlipidemia    Coronary artery disease involving native coronary artery without angina pectoris    Chronic idiopathic constipation    History of nephrolithiasis    Myofascial pain    Cervical spondylosis without myelopathy       Past Medical History:   Diagnosis Date    History of transfusion     Hyperlipemia     Hypertension        Past Surgical History:   Procedure Laterality Date    APPENDECTOMY      IR LUMBAR EPIDURAL STEROID INJECTION  12/13/2006    IR LUMBAR EPIDURAL STEROID INJECTION  12/6/2007    JOINT REPLACEMENT Right     hip    [facility]    TOTAL HIP ARTHROPLASTY Right     ZZC CABG, VEIN, SINGLE      Description: CABG (CABG);  Recorded: 08/04/2014;    ZZHC CORONARY STENT PERCUT, INITIAL VESSEL      Description: Cath Stent Placement;  Recorded: 08/04/2014;       Current Outpatient Medications   Medication    multivitamin therapeutic (THERAGRAN) tablet    nitroGLYcerin (NITROSTAT) 0.4 MG sublingual tablet    oxybutynin (DITROPAN XL) 15 MG 24 hr tablet    oxyCODONE (ROXICODONE) 5 MG tablet    simvastatin (ZOCOR) 20 MG tablet    tamsulosin (FLOMAX) 0.4 MG capsule    valACYclovir (VALTREX) 1000 mg tablet    clopidogrel (PLAVIX) 75 MG tablet     No current facility-administered medications for this visit.       Allergies   Allergen Reactions    Shellfish Containing Products [Shellfish-Derived Products] Unknown     Shrimp    Shrimp Extract Allergy Skin Test      Other Reaction(s): Not available    Ampicillin Rash     On face       Family History   Problem Relation Age of Onset    Prostate Cancer Brother        Social History     Socioeconomic History    Marital status:      Spouse name: None    Number of children: None    Years of education: None    Highest education level: None   Tobacco Use    Smoking status: Never     Passive exposure: Past    Smokeless tobacco: Never   Substance and Sexual Activity    Alcohol use: No    Drug use: No          Review of Systems  Pertinent items are noted in HPI.      Objective:     /67 (BP Location: Right arm, Patient Position: Sitting, Cuff Size: Adult Regular)   Pulse 114   Temp 97.9  F (36.6  C) (Oral)   Resp 24   SpO2 96%      General appearance: alert, appears stated age, and cooperative  Extremities: Patient noted to have some mild tenderness of his posterior left shoulder.  No AC joint tenderness.  No clavicular tenderness.  He has limited range of motion secondary to pain with flexion, abduction        This note has been dictated using voice recognition software. Any grammatical or context distortions are unintentional and inherent to the software

## 2023-09-20 ENCOUNTER — TRANSFERRED RECORDS (OUTPATIENT)
Dept: HEALTH INFORMATION MANAGEMENT | Facility: CLINIC | Age: 88
End: 2023-09-20

## 2023-10-12 ENCOUNTER — OFFICE VISIT (OUTPATIENT)
Dept: PALLIATIVE MEDICINE | Facility: OTHER | Age: 88
End: 2023-10-12
Attending: STUDENT IN AN ORGANIZED HEALTH CARE EDUCATION/TRAINING PROGRAM
Payer: COMMERCIAL

## 2023-10-12 ENCOUNTER — TELEPHONE (OUTPATIENT)
Dept: PALLIATIVE MEDICINE | Facility: OTHER | Age: 88
End: 2023-10-12

## 2023-10-12 VITALS — OXYGEN SATURATION: 97 % | DIASTOLIC BLOOD PRESSURE: 58 MMHG | SYSTOLIC BLOOD PRESSURE: 111 MMHG

## 2023-10-12 DIAGNOSIS — M67.912 TENDINOPATHY OF LEFT ROTATOR CUFF: Primary | ICD-10-CM

## 2023-10-12 PROCEDURE — 99214 OFFICE O/P EST MOD 30 MIN: CPT | Performed by: STUDENT IN AN ORGANIZED HEALTH CARE EDUCATION/TRAINING PROGRAM

## 2023-10-12 PROCEDURE — G0463 HOSPITAL OUTPT CLINIC VISIT: HCPCS | Performed by: STUDENT IN AN ORGANIZED HEALTH CARE EDUCATION/TRAINING PROGRAM

## 2023-10-12 ASSESSMENT — PAIN SCALES - GENERAL: PAINLEVEL: EXTREME PAIN (8)

## 2023-10-12 NOTE — CONFIDENTIAL NOTE
Procedure ordered:  Subacromial bursa injection-left    No red flags noted at this time  No need to stop plavix for this injection

## 2023-10-12 NOTE — TELEPHONE ENCOUNTER
"Screening Questions for Radiology Injections:     Left subacromial bursa injection       Injection to be done at which interventional clinic site? [facility]    If choosing [facility] for location, please inform patient:  \"[facility] is a Hospital based clinic. Before your visit, you should check with your insurance about how it covers the charges for facility services in a hospital-based clinic.     Procedure ordered by Dr Tran    Procedure ordered?  Dr Tran     Left subacromial bursa injection     Transforaminal Cervical GERARD - Send to [facility] ([facility]) - No [facility] Site providers perform this procedure    What insurance would patient like us to bill for this procedure? [employer] MEDICARE   IF SCHEDULING IN [place] PAIN OR SPINE PLEASE SCHEDULE AT LEAST 7-10 BUSINESS DAYS OUT SO A PA CAN BE OBTAINED  Worker's comp or MVA (motor vehicle accident) -Any injection DO NOT SCHEDULE and route to Rosalee De La Torre.    [employer] insurance - For SI joint injections, DO NOT SCHEDULE and route to Eloisa Panchal.     ALL BCBS, [employer] and [employer] - DO NOT SCHEDULE and route to Eloisa Panchal  MEDICA- facet joint injections, [facility]    Is patient scheduled at [facility]? NO    If YES, route every encounter to [facility] SPINE CENTER CARE NAVIGATION POOL [[phone]]    Is an  needed? No     Patient has a  home? (Review Grid) YES: WIFE WILL DRIVE     Any chance of pregnancy? Not Applicable   If YES, do NOT schedule and route to RN pool  - Dr. Thornton route to Ira Godfrey and PM&R Nurse  [[id number]]      Is patient actively being treated for cancer or immunocompromised? No  If YES, do NOT schedule and route to RN pool/ Dr. Thornton's Team    Does the patient have a bleeding or clotting disorder? No   If YES, okay to schedule AND route to RN nurse / Dr. Thornton's Team   (For any patients with platelet count <100, RN must forward to provider)    Is patient taking any Blood Thinners OR " Antiplatelet medication?  Yes - YES    Plavix   If hold needed, do NOT schedule, route to RN pool/ Dr. Thornton's Team  Examples:   Blood Thinners: (Coumadin, Warfarin, Jantoven, Pradaxa, Xarelto, Eliquis, Edoxaban, Enoxaparin, Lovenox, Heparin, Arixtra, Fondaparinux or Fragmin)  Antiplatelet Medications: (Plavix, Brilinta or Effient)     Is patient taking any aspirin products (includes Excedrin and Fiorinal)? No   If more than 325mg/day, OK to schedule; Instruct Pt to decrease to less than 325 mg for 7 days AND route to RN pool/ Dr. Thornton's Team   For CERVICAL procedures, hold all aspirin products for 6 days.   Tell Pt that if aspirin product is not held for 6 days, the procedure WILL BE cancelled.     Any allergies to contrast dye, iodine, shellfish, or numbing and steroid medications? No  If YES, schedule and add allergy information to appointment notes AND route to the RN pool/ Dr. Thornton's Team  If GERARD and Contrast Dye / Iodine Allergy? DO NOT SCHEDULE, route to RN pool/ Dr. Thornton's Team  Allergies: Shellfish containing products [shellfish-derived products], Shrimp extract allergy skin test, and Ampicillin     Does patient have an active infection or treated for one within the past week? No  Is patient currently taking any antibiotics or steroid medications?  No   For patients on chronic, preventative, or prophylactic antibiotics, procedures may be scheduled.   For patients on antibiotics for active or recent infection, schedule 4 days after completed.  For patients on steroid medications, schedule 4 days after completed.     Has the patient had a flu shot or any other vaccinations within the past 7 days? No  HAVING FLU INJECTION ON 10/14  If yes, explain that for the vaccine to work best they need to:     wait 1 week before and 1 week after getting any Vaccine  wait 1 week before and 2 weeks after getting Covid Vaccine #2 or BOOSTER  If patient has concerns about the timing, send to RN pool/ Dr. Thornton's  Team    Does patient have an MRI/CT?  YES: 5/17 Include Date and Check Procedure Scheduling Grid to see if required.  Was the MRI/CT done within the last 3 years?  Yes   If no route to RN Pool/ Dr. Vieras Team  If yes, where was the MRI/CT done? [facility]    Refer to PACS Transmissions list for approved external locations and route to RN Pool High Priority/ Dr. Toribio Team  If MRI was not done at approved external location do NOT schedule and route to RN pool/ Dr. Toribio Team    If patient has an imaging disc, the injection MAY be scheduled but patient must bring disc to appt or appt will be cancelled.    Is patient able to transfer to a procedure table with minimal or no assistance? Yes   If no, do NOT schedule and route to  Pool/ Dr. Thornton's Team    Procedure Specific Instructions:  If celiac plexus block, informed patient NPO for 6 hours and that it is okay to take medications with sips of water, especially blood pressure medications Not Applicable       If this is for a cervical procedure, informed patient that aspirin needs to be held for 6 days.   Not Applicable    Sedation, If Sedation is ordered for any procedure, patient must be NPO for 6 hours prior to procedure Not Applicable    If IV needed:  Do not schedule procedures requiring IV placement in the first appointment of the day or first appointment after lunch. Do NOT schedule at 0745, 0815 or 1245. NA  Instructed patient to arrive 30 minutes early for IV start if required. (Check Procedure Scheduling Grid)  Not Applicable    Reminders:  If you are started on any steroids or antibiotics between now and your appointment, you must contact us because the procedure may need to be cancelled.  No  PT WILL CALL NURSE TO INFORM IF HE STARTS ANY    As a reminder, receiving steroids can decrease your body's ability to fight infection.   Would you still like to move forward with scheduling the injection?  Yes    IV Sedation is not provided for procedures. If  oral anti-anxiety medication is needed, the patient should request this from their referring provider.    Instruct patient to arrive as directed prior to the scheduled appointment time:  If IV needed 30 minutes before appointment time     For patients 85 or older we recommend having an adult stay w/ them for the remainder of the day.     If the patient is Diabetic, remind them to bring their glucometer.      Does the patient have any questions?  ENMANUEL Olvera  [facility]

## 2023-10-12 NOTE — PROGRESS NOTES
Patient presents to the clinic today for a visit  with Abilio Tran MD            5/11/2023    12:59 PM 8/17/2023     1:09 PM 10/12/2023    11:25 AM   PEG Score   PEG Total Score 5 2.67 6.67       UDS/CSA-    Medications-    QUESTIONS:    Antonia ZHAO [facility] [place]

## 2023-10-12 NOTE — PATIENT INSTRUCTIONS
Procedures:   Repeat left Subacromial bursa injection ordered - in shared decision making given age, condition, we will not wait the full 3 months for a repeat so that we can potentially capture more pain relief  Left suprascapular nerve block and/or PNS can be considered for the future  C-ILESI can be repeated beyond 3 month suhail from prior once it wears off  Physical Therapy: Continue PT and continue HEP  Diagnostic Studies:   Last MRI Cervical spine 5/17/23  Medication Management:   Continue tylenol  Continue methocarbamol 500mg up to 4 times a day as needed    No Flexeril, tizanidine, or baclofen due to known CNS depression/sedation effects, which could result in falls.  Use advil sparingly  Follow up: 3 months    Abilio Tran MD  Interventional Pain Medicine  [facility] Number:  [phone]  Call with any questions about your care and for scheduling assistance.   Calls are returned Monday through Friday between 8 AM and 4:30 PM. We usually get back to you within 2 business days depending on the issue/request.    If we are prescribing your medications:  For opioid medication refills, call the clinic or send a Paddle8 message 7 days in advance.  Please include:  Name of requested medication  Name of the pharmacy.  For non-opioid medications, call your pharmacy directly to request a refill. Please allow 3-4 days to be processed.   Per [place] State Law:  All controlled substance prescriptions must be filled within 30 days of being written.    For those controlled substances allowing refills, pickup must occur within 30 days of last fill.      We believe regular attendance is key to your success in our program!    Any time you are unable to keep your appointment we ask that you call us at least 24 hours in advance to cancel.This will allow us to offer the appointment time to another patient.   Multiple missed appointments may  lead to dismissal from the clinic.

## 2023-10-12 NOTE — PROGRESS NOTES
[facility] Follow-up    Date of visit: 10/12/2023    Chief complaint:   Chief Complaint   Patient presents with    Pain    Pain Management       Interval history:  Since his last visit, Balwinder Huang reports:  Had post procedure pain from left subacromial bursa injection which resolved and then injection was efficacious to reduced pain to 2-3/10, starting to wear off.  Has not tried PT for this condition yet.    Pain scores:  Pain intensity currently is 7 on a scale of 0-10.     Current pain treatments:   Methocarbamol 500mg QID PRN - somewhat helpful for the shoulder  Tylenol - helpful  Advil - helping    Other relevant meds:  Plavix     Previous medication treatments included:  Aleve - helpful     Other treatments have included:  Balwinder Huang has not been seen at a pain clinic in the past.  For right hip  PT: undergoing - doing HEP. Helpful  Injections:               7/6//23 - C-ILESI - 70% relief, ongoing              3/29/23 TPI left neck, thoracic paraspinals, rhomboids, levator scapula - helped for 1 day  Surgery: none  Alternative: chiropractor - has been going, helped a little    Side Effects: no side effect    Medications:  Current Outpatient Medications   Medication Sig Dispense Refill    multivitamin therapeutic (THERAGRAN) tablet [MULTIVITAMIN THERAPEUTIC (THERAGRAN) TABLET] Take 1 tablet by mouth daily.      nitroGLYcerin (NITROSTAT) 0.4 MG sublingual tablet nitroglycerin 0.4 mg sublingual tablet   PLACE ONE TABLET UNDER TONGUE AS NEEDED FOR CHEST PAIN EVERY 5 MINUTES AS DIRECTED      oxybutynin (DITROPAN XL) 15 MG 24 hr tablet [OXYBUTYNIN (DITROPAN XL) 15 MG 24 HR TABLET] Take 15 mg by mouth daily.      simvastatin (ZOCOR) 20 MG tablet TAKE 1 TABLET(20 MG) BY MOUTH AT BEDTIME 90 tablet 3    tamsulosin (FLOMAX) 0.4 MG capsule TAKE 2 CAPSULES(0.8 MG) BY MOUTH DAILY AFTER BREAKFAST 180 capsule 2    valACYclovir (VALTREX) 1000 mg tablet        clopidogrel (PLAVIX) 75 MG tablet Take 1 tablet (75 mg) by mouth daily (Patient not taking: Reported on 9/13/2023) 90 tablet 1       Medical History: any changes in medical history since they were last seen? No    Physical Exam:  Blood pressure 111/58, SpO2 97%.  Constitutional: Well developed, NAD  Head: normocephalic. Atraumatic.   Eyes: no redness or jaundice noted   CV: warm, well perfused extremities   Skin: no suspicious lesions or rashes   Psychiatric: mentation appears normal and affect full      Assessment:   Cervical spondylosis  Myofascial pain  Left rotator cuff tendinopathy     Balwinder Huang is a 89 year old male who presents for follow up of his left-sided neck pain amd shoulder pain.  The patient has undergone physical therapy for the neck, and states the home exercise programs are helping.  We tried a trigger point injection which helped for 1 day then wore off.  We subsequently obtained an MRI and performed C-ILESI which helped for 70% of his pain. His current primary issue is the left shoulder pain. We previously have examined this and there were multiple examination maneuvers localizing to the left rotator cuff, specifically the supraspinatus muscle. We performed a left subacromial bursa injection which has provided relief, albeit shorter term than desired. We will repeat this in discussion with the patient to see if longer term relief can be gained. If this does not work, we would progress to a left suprascapular nerve block and possibly peripheral nerve stimulation if this does not improve from the initial injections. We will continue the methocarbamol, and we have ordered PT to focus on the left rotator cuff. We will see him back in 3 months to reevaluate.     Plan:  Diagnosis reviewed, treatment option addressed, and risk/benefits discussed.      Procedures:   Repeat left Subacromial bursa injection ordered - in shared decision making given age, condition, we will not wait the full 3 months  for a repeat so that we can potentially capture more pain relief  Left suprascapular nerve block and/or PNS can be considered for the future  C-ILESI can be repeated beyond 3 month suhail from prior once it wears off  Physical Therapy: Continue PT and continue HEP  Diagnostic Studies:   Last MRI Cervical spine 5/17/23  Medication Management:   Continue tylenol  Continue methocarbamol 500mg up to 4 times a day as needed    No Flexeril, tizanidine, or baclofen due to known CNS depression/sedation effects, which could result in falls.  Use advil sparingly  Follow up: 3 months    Abilio Tran MD  Interventional Pain Medicine  [facility]

## 2023-10-26 ENCOUNTER — TELEPHONE (OUTPATIENT)
Dept: PALLIATIVE MEDICINE | Facility: OTHER | Age: 88
End: 2023-10-26

## 2023-10-26 NOTE — TELEPHONE ENCOUNTER
Called to inform pt's wife that pt does not need to stop his Plavix for his subacromial bursa injection on Wednesday, 11/1. Wife states she will notify the pt.

## 2023-10-30 ENCOUNTER — THERAPY VISIT (OUTPATIENT)
Dept: PHYSICAL THERAPY | Facility: REHABILITATION | Age: 88
End: 2023-10-30
Payer: COMMERCIAL

## 2023-10-30 DIAGNOSIS — M67.912 TENDINOPATHY OF LEFT ROTATOR CUFF: ICD-10-CM

## 2023-10-30 PROCEDURE — 97161 PT EVAL LOW COMPLEX 20 MIN: CPT | Mod: GP

## 2023-10-30 PROCEDURE — 97110 THERAPEUTIC EXERCISES: CPT | Mod: GP

## 2023-10-30 NOTE — PROGRESS NOTES
PHYSICAL THERAPY EVALUATION  Type of Visit: Evaluation    See electronic medical record for Abuse and Falls Screening details.    Subjective Pt reports that his neck is doing well. Pt reports that he has been completing his PT exercises for his neck pain. Pt reports pain in left shoulder. Pt reports worse is when riding in car that the bouncing around causes the most left shoulder pain. Pt reports some numbness/tingling down arm to fingertips. Pt reports pain can start out as sharp but go to a steady ache. Pt reports reaching is okay, but has difficulty getting dressed. Pt reports neck hurts a little bit, but not as bad as used to. Pt reports when riding in car, pain in in back of his arm in the area of his deltoid and triceps.      Presenting condition or subjective complaint: Pain in left shoulder  Date of onset: 10/12/23    Relevant medical history: Arthritis; Bladder or bowel problems; Depression; Heart problems; Implanted device   Dates & types of surgery: Will provide a list at the appointment time    Prior diagnostic imaging/testing results: X-ray     Prior therapy history for the same diagnosis, illness or injury: No      Prior Level of Function  Transfers: Independent, Assistive equipment. 4WW  Ambulation: Independent, Assistive equipment, 4WW    Living Environment  Social support: With a significant other or spouse   Type of home: House   Stairs to enter the home: Yes 1 Is there a railing: No   Ramp: No   Stairs inside the home: Yes 10 Is there a railing: Yes   Help at home: Home management tasks (cooking, cleaning); Home and Yard maintenance tasks; Assist for driving and community activities  Equipment owned: Straight Cane; Walker with wheels; Raised toilet seat; Lift chair     Employment: No    Hobbies/Interests: reading, stamp and coin collecting    Patient goals for therapy: Have lessened pain and more use of left arm    Pain assessment: Pain present     Objective   SHOULDER EVALUATION  PAIN: Pain  Level at Rest: 4/10  Pain Level with Use: 10/10  Pain Location: shoulder and left shoulder  Pain Quality: Aching and Sharp  Pain Frequency: intermittent or daily  Pain is Worst: same all the time, depends on what he does  Pain is Exacerbated By: riding in car, reaching, getting dressed  Pain is Relieved By: rest  Pain Progression: Unchanged  ROM:   (Degrees) Left AROM Left PROM Right AROM  Right PROM   Shoulder Flexion 25, pain  105 WFL   Shoulder Extension       Shoulder Abduction 25, pain  110 WFL   Shoulder Adduction       Shoulder Internal Rotation T12  T8 WFL   Shoulder External Rotation 62  63 WFL   Shoulder Horizontal Abduction       Shoulder Horizontal Adduction       Shoulder Flexion ER       Shoulder Flexion IR       Elbow Extension WFL WFL WFL WFL   Elbow Flexion WFL WFL WFL WFL     STRENGTH: WFL, limited due to pain on L, weak L , pain with L elbow extension  FLEXIBILITY: WFL, limited due to pain  PALPATION:  L supraspinatus musculature and L deltoid musculature pain to touch      Assessment & Plan   CLINICAL IMPRESSIONS  Medical Diagnosis: Tendinopathy of left rotator cuff    Treatment Diagnosis: Left shoulder pain   Impression/Assessment: Patient is a 89 year old male with left shoulder pain complaints.  The following significant findings have been identified: Pain, Decreased ROM/flexibility, Decreased joint mobility, Impaired muscle performance, and Decreased activity tolerance. These impairments interfere with their ability to perform self care tasks, work tasks, recreational activities, household chores, household mobility, and community mobility as compared to previous level of function.     Clinical Decision Making (Complexity):  Clinical Presentation: Stable/Uncomplicated  Clinical Presentation Rationale: based on medical and personal factors listed in PT evaluation  Clinical Decision Making (Complexity): Low complexity    PLAN OF CARE  Treatment Interventions:  Modalities: Cryotherapy,  E-stim, Hot Pack  Interventions: Gait Training, Manual Therapy, Neuromuscular Re-education, Therapeutic Activity, Therapeutic Exercise, Self-Care/Home Management    Long Term Goals     PT Goal 1  Goal Identifier: HEP  Goal Description: Pt will be independent with HEP to improve mobility and decrease pain.  Target Date: 01/21/24  PT Goal 2  Goal Identifier: Dressing  Goal Description: Pt will report ability to get dressed with less than 2/10 pain to improve functional mobility and improve quality of life.  Target Date: 01/21/24  PT Goal 3  Goal Identifier: Riding in car  Goal Description: Pt will report less than 2/10 pain when riding in car to improve quality of life.  Target Date: 01/21/24      Frequency of Treatment: 1 time a week  Duration of Treatment: 1 time a week or every other week for a minimum of 12 weeks, minimum of 6 sessions    Recommended Referrals to Other Professionals:  none  Education Assessment:   Learner/Method: Patient;Significant Other;Demonstration;Pictures/Video    Risks and benefits of evaluation/treatment have been explained.   Patient/Family/caregiver agrees with Plan of Care.     Evaluation Time:     PT Eval, Low Complexity Minutes (90231): 20   Present: Not applicable     Signing Clinician: Rachele Richardson, PT      [facility]                                                                                   OUTPATIENT PHYSICAL THERAPY      PLAN OF TREATMENT FOR OUTPATIENT REHABILITATION   Patient's Last Name, First Name, Balwinder Quintanilla YOB: 1931   Provider's Name   [facility]   Medical Record No.  [id number]     Onset Date: 10/12/23  Start of Care Date: 10/30/23     Medical Diagnosis:  Tendinopathy of left rotator cuff      PT Treatment Diagnosis:  Left shoulder pain Plan of Treatment  Frequency/Duration: 1 time a week/ 1 time a week or every other week for a minimum of 12 weeks, minimum of 6  sessions    Certification date from 10/30/23 to 01/21/24         See note for plan of treatment details and functional goals     Rachele Richardson, PT                         I CERTIFY THE NEED FOR THESE SERVICES FURNISHED UNDER        THIS PLAN OF TREATMENT AND WHILE UNDER MY CARE     (Physician attestation of this document indicates review and certification of the therapy plan).                Referring Provider:  Abilio Tran      Initial Assessment  See Epic Evaluation- Start of Care Date: 10/30/23

## 2023-10-31 ENCOUNTER — TELEPHONE (OUTPATIENT)
Dept: PALLIATIVE MEDICINE | Facility: OTHER | Age: 88
End: 2023-10-31

## 2023-10-31 NOTE — TELEPHONE ENCOUNTER
Preprocedure appointment reminder for Bursa injection    Arrival time 0915am    Location: [facility] [address] [facility]    Has it been at least 14 days before or after your last vaccination?. yes      Have you had any oral steroids or antibiotics in the last five days? no    Its ok to eat and drink as usual and take your  BP and diabetes medications if this applies to you.     To call and reschedule or talk to the nurse about any questions you may have please dial    [phone]

## 2023-11-01 ENCOUNTER — RADIOLOGY INJECTION OFFICE VISIT (OUTPATIENT)
Dept: PALLIATIVE MEDICINE | Facility: OTHER | Age: 88
End: 2023-11-01
Attending: STUDENT IN AN ORGANIZED HEALTH CARE EDUCATION/TRAINING PROGRAM
Payer: COMMERCIAL

## 2023-11-01 VITALS — OXYGEN SATURATION: 97 % | DIASTOLIC BLOOD PRESSURE: 55 MMHG | HEART RATE: 95 BPM | SYSTOLIC BLOOD PRESSURE: 122 MMHG

## 2023-11-01 DIAGNOSIS — M67.912 TENDINOPATHY OF LEFT ROTATOR CUFF: Primary | ICD-10-CM

## 2023-11-01 PROCEDURE — 20610 DRAIN/INJ JOINT/BURSA W/O US: CPT | Mod: LT | Performed by: STUDENT IN AN ORGANIZED HEALTH CARE EDUCATION/TRAINING PROGRAM

## 2023-11-01 PROCEDURE — 20610 DRAIN/INJ JOINT/BURSA W/O US: CPT | Performed by: STUDENT IN AN ORGANIZED HEALTH CARE EDUCATION/TRAINING PROGRAM

## 2023-11-01 PROCEDURE — 250N000011 HC RX IP 250 OP 636: Performed by: STUDENT IN AN ORGANIZED HEALTH CARE EDUCATION/TRAINING PROGRAM

## 2023-11-01 RX ORDER — ROPIVACAINE HYDROCHLORIDE 2 MG/ML
4 INJECTION, SOLUTION EPIDURAL; INFILTRATION; PERINEURAL ONCE
Status: COMPLETED | OUTPATIENT
Start: 2023-11-01 | End: 2023-11-01

## 2023-11-01 RX ORDER — TRIAMCINOLONE ACETONIDE 40 MG/ML
40 INJECTION, SUSPENSION INTRA-ARTICULAR; INTRAMUSCULAR ONCE
Status: COMPLETED | OUTPATIENT
Start: 2023-11-01 | End: 2023-11-01

## 2023-11-01 RX ADMIN — TRIAMCINOLONE ACETONIDE 40 MG: 40 INJECTION, SUSPENSION INTRA-ARTICULAR; INTRAMUSCULAR at 10:01

## 2023-11-01 RX ADMIN — ROPIVACAINE HYDROCHLORIDE 4 ML: 2 INJECTION, SOLUTION EPIDURAL; INFILTRATION; PERINEURAL at 10:00

## 2023-11-01 ASSESSMENT — PAIN SCALES - GENERAL
PAINLEVEL: MILD PAIN (3)
PAINLEVEL: MODERATE PAIN (5)

## 2023-11-01 NOTE — PATIENT INSTRUCTIONS
[facility]  Post Procedure Instructions         Today you saw:  Dr. Tran         Today you had:  subacromial  bursa injection                            Medications used:  lidocaine   ropivacaine   kenolog           Go to the emergency room if you develop any shortness of breath      Monitor the injection sites for signs and symptoms of infection-fever, chills, redness, swelling, warmth, or drainage to areas.      You may have soreness at injection sites for up to 24 hours.      It may take up to 14 days for the steroid medication to start working although you may feel the effect as early as a few days after the procedure.         You may apply ice to the painful areas to help minimize the discomfort of the needle pokes.      Do not apply heat to sites for at least 12 hours.      You may use anti-inflammatory medications or Tylenol for pain control if necessary      Pain Clinic phone number:  [phone]

## 2023-11-01 NOTE — PROGRESS NOTES
Pre procedure Diagnosis: left shoulder pain  Post procedure Diagnosis: Same  Procedure performed: left subacromial bursa injection  Anesthesia: none  Complications: none  Operators: Abilio Tran MD    Indications:   Balwinder Huang is a 89 year old male was sent by myself for left subacromial bursa injection.      Options/alternatives, benefits and risks were discussed with the patient including bleeding, infection, tissue trauma, reaction to medications including seizure, nerve injury, weakness, and numbness.  Questions were answered to his  satisfaction and she agrees to proceed. Voluntary informed consent was obtained and signed.     Vitals were reviewed: Yes  Allergies were reviewed:  Yes   Medications were reviewed:  Yes   Pre-procedure pain score: 5/10    Procedure:  After getting informed consent, patient was placed on a chair in an upright position. The acromion was palpated posteriorly and a site immediately inferior was noted or marked for needle entry.    The area was then prepped with chlorhexadine in preparation for the procedure to be performed using clean technique.    A 25 gauge 1.5 inch needle was advanced immediately inferior to the posterior acromion. Upon successful entry, a mixture containing 4ml of 0.2% ropivacaine with 40mg of kenalog was injected into the subacromial space. The needle was removed.     Hemostasis was achieved, the area was cleaned, and bandaids were placed when appropriate.  The patient tolerated the procedure well, and was kept in the recovery room.    Post-procedure pain score: 3/10  Follow-up includes:   -f/u with referring provider    Abilio Tran MD  Interventional Pain Medicine  [facility]

## 2023-11-09 ENCOUNTER — THERAPY VISIT (OUTPATIENT)
Dept: PHYSICAL THERAPY | Facility: REHABILITATION | Age: 88
End: 2023-11-09
Payer: COMMERCIAL

## 2023-11-09 DIAGNOSIS — M67.912 TENDINOPATHY OF LEFT ROTATOR CUFF: Primary | ICD-10-CM

## 2023-11-09 PROCEDURE — 97110 THERAPEUTIC EXERCISES: CPT | Mod: GP

## 2023-11-09 PROCEDURE — 97140 MANUAL THERAPY 1/> REGIONS: CPT | Mod: GP

## 2023-11-20 ENCOUNTER — THERAPY VISIT (OUTPATIENT)
Dept: PHYSICAL THERAPY | Facility: REHABILITATION | Age: 88
End: 2023-11-20
Payer: COMMERCIAL

## 2023-11-20 DIAGNOSIS — M67.912 TENDINOPATHY OF LEFT ROTATOR CUFF: Primary | ICD-10-CM

## 2023-11-20 PROCEDURE — 97110 THERAPEUTIC EXERCISES: CPT | Mod: GP

## 2023-11-20 PROCEDURE — 97140 MANUAL THERAPY 1/> REGIONS: CPT | Mod: GP

## 2023-12-11 ENCOUNTER — THERAPY VISIT (OUTPATIENT)
Dept: PHYSICAL THERAPY | Facility: REHABILITATION | Age: 88
End: 2023-12-11
Payer: COMMERCIAL

## 2023-12-11 DIAGNOSIS — M67.912 TENDINOPATHY OF LEFT ROTATOR CUFF: Primary | ICD-10-CM

## 2023-12-11 PROCEDURE — 97140 MANUAL THERAPY 1/> REGIONS: CPT | Mod: GP

## 2023-12-11 PROCEDURE — 97110 THERAPEUTIC EXERCISES: CPT | Mod: GP

## 2024-01-08 ENCOUNTER — THERAPY VISIT (OUTPATIENT)
Dept: PHYSICAL THERAPY | Facility: REHABILITATION | Age: 89
End: 2024-01-08
Payer: COMMERCIAL

## 2024-01-08 DIAGNOSIS — M67.912 TENDINOPATHY OF LEFT ROTATOR CUFF: Primary | ICD-10-CM

## 2024-01-08 PROCEDURE — 97110 THERAPEUTIC EXERCISES: CPT | Mod: GP

## 2024-01-08 PROCEDURE — 97140 MANUAL THERAPY 1/> REGIONS: CPT | Mod: GP

## 2024-01-11 ENCOUNTER — OFFICE VISIT (OUTPATIENT)
Dept: PALLIATIVE MEDICINE | Facility: OTHER | Age: 89
End: 2024-01-11
Attending: STUDENT IN AN ORGANIZED HEALTH CARE EDUCATION/TRAINING PROGRAM
Payer: COMMERCIAL

## 2024-01-11 VITALS — HEART RATE: 86 BPM | DIASTOLIC BLOOD PRESSURE: 64 MMHG | SYSTOLIC BLOOD PRESSURE: 135 MMHG | OXYGEN SATURATION: 96 %

## 2024-01-11 DIAGNOSIS — M54.12 CERVICAL RADICULOPATHY: Primary | ICD-10-CM

## 2024-01-11 DIAGNOSIS — M67.912 TENDINOPATHY OF LEFT ROTATOR CUFF: ICD-10-CM

## 2024-01-11 PROCEDURE — G0463 HOSPITAL OUTPT CLINIC VISIT: HCPCS | Performed by: STUDENT IN AN ORGANIZED HEALTH CARE EDUCATION/TRAINING PROGRAM

## 2024-01-11 PROCEDURE — 99214 OFFICE O/P EST MOD 30 MIN: CPT | Performed by: STUDENT IN AN ORGANIZED HEALTH CARE EDUCATION/TRAINING PROGRAM

## 2024-01-11 ASSESSMENT — PAIN SCALES - GENERAL: PAINLEVEL: MILD PAIN (2)

## 2024-01-11 NOTE — PROGRESS NOTES
[facility] Follow-up    Date of visit: 1/11/2024    Chief complaint:   Chief Complaint   Patient presents with    Pain       Interval history:  Since his last visit, Balwinder Huang reports:  Cervical GERARD was helpful, still seeing some effects.     Ongoing left shoulder pain - performed left subacromial bursa injections with 50% improvement including the effect of PT.    Pain scores:  Pain intensity currently is 2 on a scale of 0-10.     Current pain treatments:   Methocarbamol 500mg QID PRN - somewhat helpful for the shoulder  Tylenol - helpful  Advil - helping     Other relevant meds:  Plavix     Previous medication treatments included:  Aleve - helpful     Other treatments have included:  Balwinder Huang has not been seen at a pain clinic in the past.  For right hip  PT: undergoing - doing HEP. Helpful  Injections:    11//23 - left subacromial bursa injection - helpful 50%              7/6//23 - C-ILESI - 70% relief, ongoing              3/29/23 TPI left neck, thoracic paraspinals, rhomboids, levator scapula - helped for 1 day  Surgery: none  Alternative: chiropractor - has been going, helped a little    Side Effects: no side effect    Medications:  Current Outpatient Medications   Medication Sig Dispense Refill    clopidogrel (PLAVIX) 75 MG tablet Take 1 tablet (75 mg) by mouth daily 90 tablet 1    multivitamin therapeutic (THERAGRAN) tablet [MULTIVITAMIN THERAPEUTIC (THERAGRAN) TABLET] Take 1 tablet by mouth daily.      nitroGLYcerin (NITROSTAT) 0.4 MG sublingual tablet nitroglycerin 0.4 mg sublingual tablet   PLACE ONE TABLET UNDER TONGUE AS NEEDED FOR CHEST PAIN EVERY 5 MINUTES AS DIRECTED      oxybutynin (DITROPAN XL) 15 MG 24 hr tablet [OXYBUTYNIN (DITROPAN XL) 15 MG 24 HR TABLET] Take 15 mg by mouth daily.      simvastatin (ZOCOR) 20 MG tablet TAKE 1 TABLET(20 MG) BY MOUTH AT BEDTIME 90 tablet 3    tamsulosin (FLOMAX) 0.4 MG capsule TAKE 2 CAPSULES(0.8  MG) BY MOUTH DAILY AFTER BREAKFAST 180 capsule 2    valACYclovir (VALTREX) 1000 mg tablet          Medical History: any changes in medical history since they were last seen? No    Physical Exam:  Blood pressure 135/64, pulse 86, SpO2 96%.  Constitutional: Well developed, NAD  Head: normocephalic. Atraumatic.   Eyes: no redness or jaundice noted   CV: warm, well perfused extremities   Skin: no suspicious lesions or rashes   Psychiatric: mentation appears normal and affect full    Diagnostics:  ***    Personally reviewed: yes***    Assessment:   Cervical spondylosis  Myofascial pain  Left rotator cuff tendinopathy     Balwinder Huang is a 89 year old male who presents for follow up of his left-sided neck pain amd shoulder pain.  The patient has undergone physical therapy for the neck, and states the home exercise programs are helping.  We tried a trigger point injection which helped for 1 day then wore off.  We subsequently obtained an MRI and performed C-ILESI which helped for 70% of his pain. His current primary issue is the left shoulder pain. We previously have examined this and there were multiple examination maneuvers localizing to the left rotator cuff, specifically the supraspinatus muscle. We performed a left subacromial bursa injection which has provided relief, albeit shorter term than desired. We will repeat this in discussion with the patient to see if longer term relief can be gained. If this does not work, we would progress to a left suprascapular nerve block and possibly peripheral nerve stimulation if this does not improve from the initial injections. We will continue the methocarbamol, and we have ordered PT to focus on the left rotator cuff. We will see him back in 3 months to reevaluate.     Plan:  Diagnosis reviewed, treatment option addressed, and risk/benefits discussed.      Procedures:   Repeat left Subacromial bursa injection ordered - in shared decision making given age, condition, we will not  wait the full 3 months for a repeat so that we can potentially capture more pain relief  Left suprascapular nerve block and/or PNS can be considered for the future  C-ILESI can be repeated beyond 3 month suhail from prior once it wears off  Physical Therapy: Continue PT and continue HEP  Diagnostic Studies:   Last MRI Cervical spine 5/17/23  Medication Management:   Continue tylenol  Continue methocarbamol 500mg up to 4 times a day as needed    No Flexeril, tizanidine, or baclofen due to known CNS depression/sedation effects, which could result in falls.  Use advil sparingly  Follow up: 3 months       *** minutes spent on the date of encounter doing chart review, history, and exam documentation and further activities as noted above.     Abilio Tran MD  Interventional Pain Medicine  [facility]

## 2024-01-11 NOTE — PROGRESS NOTES
[facility] Follow-up    Date of visit: 1/11/2024    Chief complaint:   Chief Complaint   Patient presents with    Pain       Interval history:  Since his last visit, Balwinder Huang reports:  Improvement from left subacromial bursa injection + PT  Improvement from C-ILESI    Pain scores:  Pain intensity currently is 2 on a scale of 0-10.     Current pain treatments:   Methocarbamol 500mg QID PRN - somewhat helpful for the shoulder  Tylenol - helpful  Advil - helping     Other relevant meds:  Plavix     Previous medication treatments included:  Aleve - helpful     Other treatments have included:  Balwinder Huang has not been seen at a pain clinic in the past.  For right hip  PT: undergoing - doing HEP. Helpful  Injections:               7/6//23 - C-ILESI - 70% relief, ongoing              3/29/23 TPI left neck, thoracic paraspinals, rhomboids, levator scapula - helped for 1 day  Surgery: none  Alternative: chiropractor - has been going, helped a little    Side Effects: no side effect    Medications:  Current Outpatient Medications   Medication Sig Dispense Refill    clopidogrel (PLAVIX) 75 MG tablet Take 1 tablet (75 mg) by mouth daily 90 tablet 1    multivitamin therapeutic (THERAGRAN) tablet [MULTIVITAMIN THERAPEUTIC (THERAGRAN) TABLET] Take 1 tablet by mouth daily.      nitroGLYcerin (NITROSTAT) 0.4 MG sublingual tablet nitroglycerin 0.4 mg sublingual tablet   PLACE ONE TABLET UNDER TONGUE AS NEEDED FOR CHEST PAIN EVERY 5 MINUTES AS DIRECTED      oxybutynin (DITROPAN XL) 15 MG 24 hr tablet [OXYBUTYNIN (DITROPAN XL) 15 MG 24 HR TABLET] Take 15 mg by mouth daily.      simvastatin (ZOCOR) 20 MG tablet TAKE 1 TABLET(20 MG) BY MOUTH AT BEDTIME 90 tablet 3    tamsulosin (FLOMAX) 0.4 MG capsule TAKE 2 CAPSULES(0.8 MG) BY MOUTH DAILY AFTER BREAKFAST 180 capsule 2    valACYclovir (VALTREX) 1000 mg tablet          Medical History: any changes in medical history since they  were last seen? No    Physical Exam:  Blood pressure 135/64, pulse 86, SpO2 96%.  Constitutional: Well developed, NAD  Head: normocephalic. Atraumatic.   Eyes: no redness or jaundice noted   CV: warm, well perfused extremities   Skin: no suspicious lesions or rashes   Psychiatric: mentation appears normal and affect full    Diagnostics:  none    Personally reviewed: N/A    Assessment:   Cervical spondylosis  Myofascial pain  Left rotator cuff tendinopathy     Balwinder Huang is a 89 year old male who presents for follow up of his left-sided neck pain and shoulder pain.  We have been treating both lumbar radiculopathy and left rotator cuff tendinopathy.  Cervical GERARD's have been significantly helpful, and the most recent subacromial bursa injection on the left was also quite helpful, and junction with physical therapy.  Patient notes functional improvements, and the wife has noted less reporting of pain while performing activities.  Patient is pleased with interventional management, and would like to continue.  We will repeat both the cervical epidural steroid injection as well as the left subacromial bursa injection, at least 1 month apart.  Will continue methocarbamol as needed.  I have encouraged the patient to proceed with home exercise program at home as his physical therapy wraps up. We will see him back in 3 months to reevaluate.     Plan:  Diagnosis reviewed, treatment option addressed, and risk/benefits discussed.      Procedures:   Repeat C-ILESI ordered  Repeat left Subacromial bursa injection ordered to be done at least 1 month after C-ILESI  Left suprascapular nerve block and/or PNS can be considered for the future  Physical Therapy: Continue PT and continue HEP  Diagnostic Studies:   Last MRI Cervical spine 5/17/23  Medication Management:   Continue tylenol  Continue methocarbamol 500mg up to 4 times a day as needed    No Flexeril, tizanidine, or baclofen due to known CNS depression/sedation effects,  which could result in falls.  Use advil sparingly  Follow up: 3 months       Abilio Tran MD  Interventional Pain Medicine  [facility]

## 2024-01-11 NOTE — PROGRESS NOTES
Patient presents to the clinic today for a visit with Abilio Tran MD regarding Pain Management.          8/17/2023     1:09 PM 10/12/2023    11:25 AM 1/11/2024     2:15 PM   PEG Score   PEG Total Score 2.67 6.67 4.33         QUESTIONS:Mild pain left side of neck, and exercise does relieve it. Left shoulder is more sore, 3 to four up to an 8 especially at night. Left arm tingling and weak.     Hortensia Watkins  [facility] Clinical Assistant

## 2024-01-11 NOTE — PATIENT INSTRUCTIONS
Procedures:   Repeat C-ILESI ordered  Repeat left Subacromial bursa injection ordered to be done at least 1 month after C-ILESI  Left suprascapular nerve block and/or PNS can be considered for the future  Physical Therapy: Continue PT and continue HEP  Diagnostic Studies:   Last MRI Cervical spine 5/17/23  Medication Management:   Continue tylenol  Continue methocarbamol 500mg up to 4 times a day as needed    No Flexeril, tizanidine, or baclofen due to known CNS depression/sedation effects, which could result in falls.  Use advil sparingly  Follow up: 3 months    Abilio Tran MD  Interventional Pain Medicine  [facility] Number:  [phone]  Call with any questions about your care and for scheduling assistance.   Calls are returned Monday through Friday between 8 AM and 4:30 PM. We usually get back to you within 2 business days depending on the issue/request.    If we are prescribing your medications:  For opioid medication refills, call the clinic or send a [employer] message 7 days in advance.  Please include:  Name of requested medication  Name of the pharmacy.  For non-opioid medications, call your pharmacy directly to request a refill. Please allow 3-4 days to be processed.   Per [place] State Law:  All controlled substance prescriptions must be filled within 30 days of being written.    For those controlled substances allowing refills, pickup must occur within 30 days of last fill.      We believe regular attendance is key to your success in our program!    Any time you are unable to keep your appointment we ask that you call us at least 24 hours in advance to cancel.This will allow us to offer the appointment time to another patient.   Multiple missed appointments may lead to dismissal from the clinic.

## 2024-01-18 ENCOUNTER — TRANSFERRED RECORDS (OUTPATIENT)
Dept: HEALTH INFORMATION MANAGEMENT | Facility: CLINIC | Age: 89
End: 2024-01-18
Payer: COMMERCIAL

## 2024-01-29 ASSESSMENT — ENCOUNTER SYMPTOMS
SORE THROAT: 0
DYSURIA: 0
FREQUENCY: 0
WEAKNESS: 0
PALPITATIONS: 0
DIZZINESS: 1
CHILLS: 0
CONSTIPATION: 1
ABDOMINAL PAIN: 0
COUGH: 0
HEADACHES: 0
ARTHRALGIAS: 1
FEVER: 0
SHORTNESS OF BREATH: 0
EYE PAIN: 0
NERVOUS/ANXIOUS: 1
JOINT SWELLING: 0
PARESTHESIAS: 0
NAUSEA: 0
HEARTBURN: 0
MYALGIAS: 1
HEMATOCHEZIA: 0
DIARRHEA: 0
HEMATURIA: 0

## 2024-01-29 ASSESSMENT — ACTIVITIES OF DAILY LIVING (ADL): CURRENT_FUNCTION: NO ASSISTANCE NEEDED

## 2024-01-30 ENCOUNTER — OFFICE VISIT (OUTPATIENT)
Dept: FAMILY MEDICINE | Facility: CLINIC | Age: 89
End: 2024-01-30
Payer: COMMERCIAL

## 2024-01-30 VITALS
OXYGEN SATURATION: 98 % | SYSTOLIC BLOOD PRESSURE: 118 MMHG | TEMPERATURE: 98.5 F | HEART RATE: 86 BPM | WEIGHT: 154 LBS | RESPIRATION RATE: 32 BRPM | BODY MASS INDEX: 26.03 KG/M2 | DIASTOLIC BLOOD PRESSURE: 47 MMHG

## 2024-01-30 DIAGNOSIS — N40.0 PROSTATE ENLARGEMENT: ICD-10-CM

## 2024-01-30 DIAGNOSIS — I25.10 CARDIOVASCULAR DISEASE: ICD-10-CM

## 2024-01-30 DIAGNOSIS — E78.00 PURE HYPERCHOLESTEROLEMIA: ICD-10-CM

## 2024-01-30 DIAGNOSIS — K59.04 CHRONIC IDIOPATHIC CONSTIPATION: ICD-10-CM

## 2024-01-30 DIAGNOSIS — Z00.00 ENCOUNTER FOR MEDICARE ANNUAL WELLNESS EXAM: Primary | ICD-10-CM

## 2024-01-30 DIAGNOSIS — Z12.5 SCREENING FOR PROSTATE CANCER: ICD-10-CM

## 2024-01-30 LAB
ERYTHROCYTE [DISTWIDTH] IN BLOOD BY AUTOMATED COUNT: 13.2 % (ref 10–15)
HBA1C MFR BLD: 5.9 % (ref 0–5.6)
HCT VFR BLD AUTO: 36.3 % (ref 40–53)
HGB BLD-MCNC: 12 G/DL (ref 13.3–17.7)
MCH RBC QN AUTO: 33.1 PG (ref 26.5–33)
MCHC RBC AUTO-ENTMCNC: 33.1 G/DL (ref 31.5–36.5)
MCV RBC AUTO: 100 FL (ref 78–100)
PLATELET # BLD AUTO: 233 10E3/UL (ref 150–450)
RBC # BLD AUTO: 3.62 10E6/UL (ref 4.4–5.9)
WBC # BLD AUTO: 10.3 10E3/UL (ref 4–11)

## 2024-01-30 PROCEDURE — 84439 ASSAY OF FREE THYROXINE: CPT | Performed by: PHYSICIAN ASSISTANT

## 2024-01-30 PROCEDURE — 84443 ASSAY THYROID STIM HORMONE: CPT | Performed by: PHYSICIAN ASSISTANT

## 2024-01-30 PROCEDURE — G0103 PSA SCREENING: HCPCS | Performed by: PHYSICIAN ASSISTANT

## 2024-01-30 PROCEDURE — 80061 LIPID PANEL: CPT | Performed by: PHYSICIAN ASSISTANT

## 2024-01-30 PROCEDURE — 80053 COMPREHEN METABOLIC PANEL: CPT | Performed by: PHYSICIAN ASSISTANT

## 2024-01-30 PROCEDURE — G0439 PPPS, SUBSEQ VISIT: HCPCS | Performed by: PHYSICIAN ASSISTANT

## 2024-01-30 PROCEDURE — 85027 COMPLETE CBC AUTOMATED: CPT | Performed by: PHYSICIAN ASSISTANT

## 2024-01-30 PROCEDURE — 36415 COLL VENOUS BLD VENIPUNCTURE: CPT | Performed by: PHYSICIAN ASSISTANT

## 2024-01-30 PROCEDURE — 99214 OFFICE O/P EST MOD 30 MIN: CPT | Mod: 25 | Performed by: PHYSICIAN ASSISTANT

## 2024-01-30 PROCEDURE — 83036 HEMOGLOBIN GLYCOSYLATED A1C: CPT | Performed by: PHYSICIAN ASSISTANT

## 2024-01-30 RX ORDER — CLOPIDOGREL BISULFATE 75 MG/1
75 TABLET ORAL DAILY
Qty: 90 TABLET | Refills: 1 | Status: SHIPPED | OUTPATIENT
Start: 2024-01-30

## 2024-01-30 RX ORDER — SIMVASTATIN 20 MG
20 TABLET ORAL AT BEDTIME
Qty: 90 TABLET | Refills: 3 | Status: SHIPPED | OUTPATIENT
Start: 2024-01-30

## 2024-01-30 RX ORDER — OXYBUTYNIN CHLORIDE 15 MG/1
15 TABLET, EXTENDED RELEASE ORAL DAILY
Qty: 90 TABLET | Refills: 3 | Status: SHIPPED | OUTPATIENT
Start: 2024-01-30

## 2024-01-30 RX ORDER — NITROGLYCERIN 0.4 MG/1
TABLET SUBLINGUAL
Qty: 10 TABLET | Refills: 0 | Status: SHIPPED | OUTPATIENT
Start: 2024-01-30

## 2024-01-30 RX ORDER — TAMSULOSIN HYDROCHLORIDE 0.4 MG/1
CAPSULE ORAL
Qty: 180 CAPSULE | Refills: 3 | Status: SHIPPED | OUTPATIENT
Start: 2024-01-30

## 2024-01-30 ASSESSMENT — ENCOUNTER SYMPTOMS
COUGH: 0
EYE PAIN: 0
JOINT SWELLING: 0
SHORTNESS OF BREATH: 0
WEAKNESS: 0
DIZZINESS: 1
CHILLS: 0
MYALGIAS: 1
FEVER: 0
HEMATURIA: 0
HEADACHES: 0
ARTHRALGIAS: 1
NERVOUS/ANXIOUS: 1
SORE THROAT: 0
FREQUENCY: 0
DIARRHEA: 0
PALPITATIONS: 0
NAUSEA: 0
ABDOMINAL PAIN: 0
DYSURIA: 0
CONSTIPATION: 1

## 2024-01-30 ASSESSMENT — ACTIVITIES OF DAILY LIVING (ADL): CURRENT_FUNCTION: NO ASSISTANCE NEEDED

## 2024-01-30 NOTE — PATIENT INSTRUCTIONS
Patient Education   Personalized Prevention Plan  You are due for the preventive services outlined below.  Your care team is available to assist you in scheduling these services.  If you have already completed any of these items, please share that information with your care team to update in your medical record.  Health Maintenance Due   Topic Date Due     ANNUAL REVIEW OF HM ORDERS  Never done     Diptheria Tetanus Pertussis (DTAP/TDAP/TD) Vaccine (1 - Tdap) 01/19/2022     Annual Wellness Visit  01/20/2024     Your Health Risk Assessment indicates you feel you are not in good health    A healthy lifestyle helps keep the body fit and the mind alert. It helps protect you from disease, helps you fight disease, and helps prevent chronic disease (disease that doesn't go away) from getting worse. This is important as you get older and begin to notice twinges in muscles and joints and a decline in the strength and stamina you once took for granted. A healthy lifestyle includes good healthcare, good nutrition, weight control, recreation, and regular exercise. Avoid harmful substances and do what you can to keep safe. Another part of a healthy lifestyle is stay mentally active and socially involved.    Good healthcare   Have a wellness visit every year.   If you have new symptoms, let us know right away. Don't wait until the next checkup.   Take medicines exactly as prescribed and keep your medicines in a safe place. Tell us if your medicine causes problems.   Healthy diet and weight control   Eat 3 or 4 small, nutritious, low-fat, high-fiber meals a day. Include a variety of fruits, vegetables, and whole-grain foods.   Make sure you get enough calcium in your diet. Calcium, vitamin D, and exercise help prevent osteoporosis (bone thinning).   If you live alone, try eating with others when you can. That way you get a good meal and have company while you eat it.   Try to keep a healthy weight. If you eat more calories than  your body uses for energy, it will be stored as fat and you will gain weight.     Recreation   Recreation is not limited to sports and team events. It includes any activity that provides relaxation, interest, enjoyment, and exercise. Recreation provides an outlet for physical, mental, and social energy. It can give a sense of worth and achievement. It can help you stay healthy.    Mental Exercise and Social Involvement  Mental and emotional health is as important as physical health. Keep in touch with friends and family. Stay as active as possible. Continue to learn and challenge yourself.   Things you can do to stay mentally active are:  Learn something new, like a foreign language or musical instrument.   Play SCRABBLE or do crossword puzzles. If you cannot find people to play these games with you at home, you can play them with others on your computer through the Internet.   Join a games club--anything from card games to chess or checkers or lawn bowling.   Start a new hobby.   Go back to school.   Volunteer.   Read.   Keep up with world events.  Learning About Dietary Guidelines  What are the Dietary Guidelines for Americans?     Dietary Guidelines for Americans provide tips for eating well and staying healthy. This helps reduce the risk for long-term (chronic) diseases.  These guidelines recommend that you:  Eat and drink the right amount for you. The U.[place] government's food guide is called MyPlate. It can help you make your own well-balanced eating plan.  Try to balance your eating with your activity. This helps you stay at a healthy weight.  Drink alcohol in moderation, if at all.  Limit foods high in salt, saturated fat, trans fat, and added sugar.  These guidelines are from the U.S. Department of Agriculture and the U.S. Department of Health and Human Services. They are updated every 5 years.  What is MyPlate?  MyPlate is the U.[place]. government's food guide. It can help you make your own well-balanced eating  "plan. A balanced eating plan means that you eat enough, but not too much, and that your food gives you the nutrients you need to stay healthy.  MyPlate focuses on eating plenty of whole grains, fruits, and vegetables, and on limiting fat and sugar. It is available online at www.ChooseMyPlate.gov.  How can you get started?  If you're trying to eat healthier, you can slowly change your eating habits over time. You don't have to make big changes all at once. Start by adding one or two healthy foods to your meals each day.  Grains  Choose whole-grain breads and cereals and whole-wheat pasta and whole-grain crackers.  Vegetables  Eat a variety of vegetables every day. They have lots of nutrients and are part of a heart-healthy diet.  Fruits  Eat a variety of fruits every day. Fruits contain lots of nutrients. Choose fresh fruit instead of fruit juice.  Protein foods  Choose fish and lean poultry more often. Eat red meat and fried meats less often. Dried beans, tofu, and nuts are also good sources of protein.  Dairy  Choose low-fat or fat-free products from this food group. If you have problems digesting milk, try eating cheese or yogurt instead.  Fats and oils  Limit fats and oils if you're trying to cut calories. Choose healthy fats when you cook. These include canola oil and olive oil.  Where can you learn more?  Go to https://www.[employer].net/patiented  Enter D676 in the search box to learn more about \"Learning About Dietary Guidelines.\"  Current as of: February 28, 2023               Content Version: 13.8    8420-5649 [employer].   Care instructions adapted under license by your healthcare professional. If you have questions about a medical condition or this instruction, always ask your healthcare professional. [employer] disclaims any warranty or liability for your use of this information.      Hearing Loss: Care Instructions  Overview     Hearing loss is a sudden or slow decrease in " how well you hear. It can range from slight to profound. Permanent hearing loss can occur with aging. It also can happen when you are exposed long-term to loud noise. Examples include listening to loud music, riding motorcycles, or being around other loud machines.  Hearing loss can affect your work and home life. It can make you feel lonely or depressed. You may feel that you have lost your independence. But hearing aids and other devices can help you hear better and feel connected to others.  Follow-up care is a key part of your treatment and safety. Be sure to make and go to all appointments, and call your doctor if you are having problems. It's also a good idea to know your test results and keep a list of the medicines you take.  How can you care for yourself at home?  Avoid loud noises whenever possible. This helps keep your hearing from getting worse.  Always wear hearing protection around loud noises.  Wear a hearing aid as directed.  A professional can help you pick a hearing aid that will work best for you.  You can also get hearing aids over the counter for mild to moderate hearing loss.  Have hearing tests as your doctor suggests. They can show whether your hearing has changed. Your hearing aid may need to be adjusted.  Use other devices as needed. These may include:  Telephone amplifiers and hearing aids that can connect to a television, stereo, radio, or microphone.  Devices that use lights or vibrations. These alert you to the doorbell, a ringing telephone, or a baby monitor.  Television closed-captioning. This shows the words at the bottom of the screen. Most new TVs can do this.  TTY (text telephone). This lets you type messages back and forth on the telephone instead of talking or listening. These devices are also called TDD. When messages are typed on the keyboard, they are sent over the phone line to a receiving TTY. The message is shown on a monitor.  Use text messaging, social media, and email  "if it is hard for you to communicate by telephone.  Try to learn a listening technique called speechreading. It is not lipreading. You pay attention to people's gestures, expressions, posture, and tone of voice. These clues can help you understand what a person is saying. Face the person you are talking to, and have them face you. Make sure the lighting is good. You need to see the other person's face clearly.  Think about counseling if you need help to adjust to your hearing loss.  When should you call for help?  Watch closely for changes in your health, and be sure to contact your doctor if:    You think your hearing is getting worse.     You have new symptoms, such as dizziness or nausea.   Where can you learn more?  Go to https://www.[employer].net/patiented  Enter R798 in the search box to learn more about \"Hearing Loss: Care Instructions.\"  Current as of: February 28, 2023               Content Version: 13.8    5106-1842 [employer].   Care instructions adapted under license by your healthcare professional. If you have questions about a medical condition or this instruction, always ask your healthcare professional. [employer] disclaims any warranty or liability for your use of this information.      Your Health Risk Assessment indicates you feel you are not in good emotional health.    Recreation   Recreation is not limited to sports and team events. It includes any activity that provides relaxation, interest, enjoyment, and exercise. Recreation provides an outlet for physical, mental, and social energy. It can give a sense of worth and achievement. It can help you stay healthy.    Mental Exercise and Social Involvement  Mental and emotional health is as important as physical health. Keep in touch with friends and family. Stay as active as possible. Continue to learn and challenge yourself.   Things you can do to stay mentally active are:  Learn something new, like a foreign language " or musical instrument.   Play SCRABBLE or do crossword puzzles. If you cannot find people to play these games with you at home, you can play them with others on your computer through the Internet.   Join a games club--anything from card games to chess or checkers or lawn bowling.   Start a new hobby.   Go back to school.   Volunteer.   Read.   Keep up with world events.

## 2024-01-30 NOTE — PROGRESS NOTES
"Preventive Care Visit  [facility]  Carolann Mclean PA-C, Physician Assistant - Medical  Jan 30, 2024      SUBJECTIVE:   Ed is a 89 year old, presenting for the following:  Physical (Would like to discuss constipation. (Heavy and thick)  )        1/30/2024     1:12 PM   Additional Questions   Roomed by CURT Enriquez CMA([facility])   Accompanied by Spouse - Jemima     Are you in the first 12 months of your Medicare coverage?  No    Healthy Habits:     In general, how would you rate your overall health?  Fair    Frequency of exercise:  6-7 days/week    Duration of exercise:  Less than 15 minutes    Do you usually eat at least 4 servings of fruit and vegetables a day, include whole grains    & fiber and avoid regularly eating high fat or \"junk\" foods?  No    Taking medications regularly:  Yes    Medication side effects:  None    Ability to successfully perform activities of daily living:  No assistance needed    Home Safety:  No safety concerns identified    Hearing Impairment:  Feel that people are mumbling or not speaking clearly, need to ask people to speak up or repeat themselves and difficulty understanding soft or whispered speech    In the past 6 months, have you been bothered by leaking of urine?  No    In general, how would you rate your overall mental or emotional health?  Fair    Additional concerns today:  Yes      Today's PHQ-2 Score:       1/29/2024     8:34 PM   PHQ-2 ( 1999 [employer])   Q1: Little interest or pleasure in doing things 0   Q2: Feeling down, depressed or hopeless 0   PHQ-2 Score 0   Q1: Little interest or pleasure in doing things Not at all   Q2: Feeling down, depressed or hopeless Not at all   PHQ-2 Score 0           Have you ever done Advance Care Planning? (For example, a Health Directive, POLST, or a discussion with a medical provider or your loved ones about your wishes): Yes, patient states has an Advance Care Planning document and will bring a copy to the " clinic.       Fall risk  Fallen 2 or more times in the past year?: No  Any fall with injury in the past year?: No    Cognitive Screening   1) Repeat 3 items (Leader, Season, Table)    2) Clock draw: NORMAL  3) 3 item recall: Recalls 3 objects  Results: 3 items recalled: COGNITIVE IMPAIRMENT LESS LIKELY    Mini-CogTM Copyright JEFF Barros. Licensed by the author for use in [facility]; reprinted with permission (soob@[facility]). All rights reserved.      Do you have sleep apnea, excessive snoring or daytime drowsiness? : no    Reviewed and updated as needed this visit by clinical staff   Tobacco  Allergies  Meds              Reviewed and updated as needed this visit by Provider                  Social History     Tobacco Use    Smoking status: Never     Passive exposure: Past    Smokeless tobacco: Never   Substance Use Topics    Alcohol use: No             1/29/2024     8:33 PM   Alcohol Use   Prescreen: >3 drinks/day or >7 drinks/week? Not Applicable     Do you have a current opioid prescription? No  Do you use any other controlled substances or medications that are not prescribed by a provider? None      Current providers sharing in care for this patient include:   Patient Care Team:  Miguel Day MD as PCP - General (Family Practice)  Joshua Gutierrez MD as Assigned PCP    The following health maintenance items are reviewed in Epic and correct as of today:  Health Maintenance   Topic Date Due    ANNUAL REVIEW OF HM ORDERS  Never done    DTAP/TDAP/TD IMMUNIZATION (1 - Tdap) 01/19/2022    MEDICARE ANNUAL WELLNESS VISIT  01/20/2024    FALL RISK ASSESSMENT  01/30/2025    ADVANCE CARE PLANNING  01/20/2028    PHQ-2 (once per calendar year)  Completed    INFLUENZA VACCINE  Completed    Pneumococcal Vaccine: 65+ Years  Completed    ZOSTER IMMUNIZATION  Completed    RSV VACCINE (Pregnancy & 60+)  Completed    COVID-19 Vaccine  Completed    IPV IMMUNIZATION  Aged Out    HPV IMMUNIZATION  Aged Out     MENINGITIS IMMUNIZATION  Aged Out    RSV MONOCLONAL ANTIBODY  Aged Out     Patient Active Problem List   Diagnosis    Essential Hypercholesterolemia    Joint Stiffness Of The Hip    Essential hypertension    Coronary Artery Disease    Fainting (Syncope)    Squamous cell carcinoma of skin, unspecified    Primary osteoarthritis of left knee    Hyperlipidemia    Coronary artery disease involving native coronary artery without angina pectoris    Chronic idiopathic constipation    History of nephrolithiasis    Myofascial pain    Cervical spondylosis without myelopathy    Tendinopathy of left rotator cuff     Past Surgical History:   Procedure Laterality Date    APPENDECTOMY      IR LUMBAR EPIDURAL STEROID INJECTION  12/13/2006    IR LUMBAR EPIDURAL STEROID INJECTION  12/6/2007    JOINT REPLACEMENT Right     hip    [facility]    TOTAL HIP ARTHROPLASTY Right     ZZC CABG, VEIN, SINGLE      Description: CABG (CABG);  Recorded: 08/04/2014;    ZZ CORONARY STENT PERCUT, INITIAL VESSEL      Description: Cath Stent Placement;  Recorded: 08/04/2014;       Social History     Tobacco Use    Smoking status: Never     Passive exposure: Past    Smokeless tobacco: Never   Substance Use Topics    Alcohol use: No     Family History   Problem Relation Age of Onset    Prostate Cancer Brother     Other Cancer Mother     Diabetes Paternal Grandmother          Current Outpatient Medications   Medication Sig Dispense Refill    clopidogrel (PLAVIX) 75 MG tablet Take 1 tablet (75 mg) by mouth daily 90 tablet 1    multivitamin therapeutic (THERAGRAN) tablet [MULTIVITAMIN THERAPEUTIC (THERAGRAN) TABLET] Take 1 tablet by mouth daily.      nitroGLYcerin (NITROSTAT) 0.4 MG sublingual tablet nitroglycerin 0.4 mg sublingual tablet   PLACE ONE TABLET UNDER TONGUE AS NEEDED FOR CHEST PAIN EVERY 5 MINUTES AS DIRECTED      oxybutynin (DITROPAN XL) 15 MG 24 hr tablet [OXYBUTYNIN (DITROPAN XL)  "15 MG 24 HR TABLET] Take 15 mg by mouth daily.      simvastatin (ZOCOR) 20 MG tablet TAKE 1 TABLET(20 MG) BY MOUTH AT BEDTIME 90 tablet 3    tamsulosin (FLOMAX) 0.4 MG capsule TAKE 2 CAPSULES(0.8 MG) BY MOUTH DAILY AFTER BREAKFAST 180 capsule 2     Allergies   Allergen Reactions    Shellfish Containing Products [Shellfish-Derived Products] Unknown     Shrimp    Shrimp Extract Allergy Skin Test      Other Reaction(s): Not available    Ampicillin Rash     On face         Review of Systems   Constitutional:  Negative for chills and fever.   HENT:  Negative for congestion, ear pain, hearing loss and sore throat.    Eyes:  Negative for pain and visual disturbance.   Respiratory:  Negative for cough and shortness of breath.    Cardiovascular:  Negative for chest pain and palpitations.   Gastrointestinal:  Positive for constipation. Negative for abdominal pain, diarrhea and nausea.   Genitourinary:  Negative for dysuria, frequency, genital sores, hematuria and urgency.   Musculoskeletal:  Positive for arthralgias and myalgias. Negative for joint swelling.   Skin:  Negative for rash.   Neurological:  Positive for dizziness. Negative for weakness and headaches.   Psychiatric/Behavioral:  The patient is nervous/anxious.         OBJECTIVE:   /47   Pulse 86   Temp 98.5  F (36.9  C) (Oral)   Resp (!) 32   Wt 69.9 kg (154 lb)   SpO2 98%   BMI 26.03 kg/m     Estimated body mass index is 26.03 kg/m  as calculated from the following:    Height as of 4/11/22: 1.638 m (5' 4.5\").    Weight as of this encounter: 69.9 kg (154 lb).  Physical Exam  GENERAL: alert and no distress  EYES: Eyes grossly normal to inspection, PERRL and conjunctivae and sclerae normal  HENT: ear canals and TM's normal, nose and mouth without ulcers or lesions  NECK: no adenopathy, no asymmetry, masses, or scars  RESP: lungs clear to auscultation - no rales, rhonchi or wheezes  CV: regular rate and rhythm, normal S1 S2, no S3 or S4, no murmur, click or " rub, no peripheral edema  ABDOMEN: soft, nontender, no hepatosplenomegaly, no masses and bowel sounds normal  MS: no gross musculoskeletal defects noted, no edema  SKIN: no suspicious lesions or rashes  NEURO: Normal strength and tone, mentation intact and speech normal  PSYCH: mentation appears normal, affect normal/bright  LYMPH: no cervical, supraclavicular, axillary, or inguinal adenopathy    Diagnostic Test Results:  none     ASSESSMENT / PLAN:   Encounter for Medicare annual wellness exam  Labs updated today.  Prostate cancer- screening done today.  Vaccines- UTD.  - CBC with platelets  - Comprehensive metabolic panel  - Hemoglobin A1c  - TSH with free T4 reflex  - Prostate Specific Antigen Screen    Cardiovascular disease  Chronic issue, will refill Plavix and Nitroglycerin today.  - clopidogrel (PLAVIX) 75 MG tablet  Dispense: 90 tablet; Refill: 1  - nitroGLYcerin (NITROSTAT) 0.4 MG sublingual tablet  Dispense: 10 tablet; Refill: 0    Pure hypercholesterolemia  Chronic issue, labs updated today.  Zocor refilled.  - simvastatin (ZOCOR) 20 MG tablet  Dispense: 90 tablet; Refill: 3    Screening for prostate cancer  - Prostate Specific Antigen Screen    Chronic idiopathic constipation  Chronic issue, discussed that he should increase use of Miralax and exercise. Ideally try to limit use of suppository.    Prostate enlargement  Chronic issue, meds refilled today.  - oxyBUTYnin ER (DITROPAN XL) 15 MG 24 hr tablet  Dispense: 90 tablet; Refill: 3  - tamsulosin (FLOMAX) 0.4 MG capsule  Dispense: 180 capsule; Refill: 3      Patient has been advised of split billing requirements and indicates understanding: Yes      Counseling  Reviewed preventive health counseling, as reflected in patient instructions        He reports that he has never smoked. He has been exposed to tobacco smoke. He has never used smokeless tobacco.      Appropriate preventive services were discussed with this patient, including applicable screening  as appropriate for fall prevention, nutrition, physical activity, Tobacco-use cessation, weight loss and cognition.  Checklist reviewing preventive services available has been given to the patient.    Reviewed patients plan of care and provided an AVS. The Basic Care Plan (routine screening as documented in Health Maintenance) for Edward meets the Care Plan requirement. This Care Plan has been established and reviewed with the Patient.          Signed Electronically by: Carolann Mclean PA-C    Identified Health Risks  I have reviewed Opioid Use Disorder and Substance Use Disorder risk factors and made any needed referrals.   Answers submitted by the patient for this visit:  Annual Preventive Visit (Submitted on 1/29/2024)  Chief Complaint: Annual Exam:  Blood in stool: No  heartburn: No  peripheral edema: No  mood changes: No  Skin sensation changes: No

## 2024-01-31 LAB
ALBUMIN SERPL BCG-MCNC: 3.7 G/DL (ref 3.5–5.2)
ALP SERPL-CCNC: 69 U/L (ref 40–150)
ALT SERPL W P-5'-P-CCNC: 17 U/L (ref 0–70)
ANION GAP SERPL CALCULATED.3IONS-SCNC: 8 MMOL/L (ref 7–15)
AST SERPL W P-5'-P-CCNC: 25 U/L (ref 0–45)
BILIRUB SERPL-MCNC: 0.3 MG/DL
BUN SERPL-MCNC: 20.6 MG/DL (ref 8–23)
CALCIUM SERPL-MCNC: 9.4 MG/DL (ref 8.2–9.6)
CHLORIDE SERPL-SCNC: 105 MMOL/L (ref 98–107)
CREAT SERPL-MCNC: 1.16 MG/DL (ref 0.67–1.17)
DEPRECATED HCO3 PLAS-SCNC: 28 MMOL/L (ref 22–29)
EGFRCR SERPLBLD CKD-EPI 2021: 59 ML/MIN/1.73M2
GLUCOSE SERPL-MCNC: 105 MG/DL (ref 70–99)
POTASSIUM SERPL-SCNC: 4.4 MMOL/L (ref 3.4–5.3)
PROT SERPL-MCNC: 6.2 G/DL (ref 6.4–8.3)
PSA SERPL DL<=0.01 NG/ML-MCNC: 3.19 NG/ML
SODIUM SERPL-SCNC: 141 MMOL/L (ref 135–145)
T4 FREE SERPL-MCNC: 0.96 NG/DL (ref 0.9–1.7)
TSH SERPL DL<=0.005 MIU/L-ACNC: 4.68 UIU/ML (ref 0.3–4.2)

## 2024-02-02 LAB
CHOLEST SERPL-MCNC: 142 MG/DL
HDLC SERPL-MCNC: 41 MG/DL
LDLC SERPL CALC-MCNC: 80 MG/DL
NONHDLC SERPL-MCNC: 101 MG/DL
TRIGL SERPL-MCNC: 104 MG/DL

## 2024-02-06 ENCOUNTER — TELEPHONE (OUTPATIENT)
Dept: PALLIATIVE MEDICINE | Facility: OTHER | Age: 89
End: 2024-02-06
Payer: COMMERCIAL

## 2024-02-06 NOTE — TELEPHONE ENCOUNTER
Preprocedure appointment reminder call Cervical GERARD    Arrival time 0145pm    Location: [facility] [address] [place] [address]    Do you have a ? yes    If patient doesn't have a  they will need to call and reschedule    Are you taking any blood thinners? yes    Have you held the blood thinner at least 6 days? Five days    If not check with the nurse or provider. The procedure will most likely need to be rescheduled.    Has the patient had a flu shot or any other vaccinations within the past 7 days? No    If yes, explain that for the vaccine to work best they need to:              wait 1 week before and 1 week after getting any Vaccine           wait 1 week before and 2 weeks after getting any Covid Vaccine    If patient has concerns about the timing, send to RN pool    Have you had any oral steroids or antibiotics in the last five days? no  (Oral steroid ok per Tran but note the patient is taking it or not)    If yes check with the nurse or provider. The procedure will most likely need to be rescheduled.    Its ok to eat and drink as usual and take your  BP and diabetes medications if this applies to you. ok (note patient has been informed yes or no)      To call and reschedule or talk to the nurse about any questions you may have please dial    [phone]

## 2024-02-07 ENCOUNTER — RADIOLOGY INJECTION OFFICE VISIT (OUTPATIENT)
Dept: PALLIATIVE MEDICINE | Facility: OTHER | Age: 89
End: 2024-02-07
Attending: STUDENT IN AN ORGANIZED HEALTH CARE EDUCATION/TRAINING PROGRAM
Payer: COMMERCIAL

## 2024-02-07 VITALS — OXYGEN SATURATION: 96 % | DIASTOLIC BLOOD PRESSURE: 58 MMHG | HEART RATE: 87 BPM | SYSTOLIC BLOOD PRESSURE: 119 MMHG

## 2024-02-07 DIAGNOSIS — M67.912 TENDINOPATHY OF LEFT ROTATOR CUFF: ICD-10-CM

## 2024-02-07 DIAGNOSIS — M54.12 CERVICAL RADICULOPATHY: ICD-10-CM

## 2024-02-07 PROCEDURE — 62321 NJX INTERLAMINAR CRV/THRC: CPT | Performed by: STUDENT IN AN ORGANIZED HEALTH CARE EDUCATION/TRAINING PROGRAM

## 2024-02-07 PROCEDURE — 255N000002 HC RX 255 OP 636: Performed by: STUDENT IN AN ORGANIZED HEALTH CARE EDUCATION/TRAINING PROGRAM

## 2024-02-07 PROCEDURE — 250N000011 HC RX IP 250 OP 636: Performed by: STUDENT IN AN ORGANIZED HEALTH CARE EDUCATION/TRAINING PROGRAM

## 2024-02-07 RX ORDER — DEXAMETHASONE SODIUM PHOSPHATE 10 MG/ML
10 INJECTION INTRAMUSCULAR; INTRAVENOUS ONCE
Status: COMPLETED | OUTPATIENT
Start: 2024-02-07 | End: 2024-02-07

## 2024-02-07 RX ADMIN — DEXAMETHASONE SODIUM PHOSPHATE 10 MG: 10 INJECTION INTRAMUSCULAR; INTRAVENOUS at 14:24

## 2024-02-07 RX ADMIN — IOHEXOL 10 ML: 180 INJECTION INTRAVENOUS at 14:31

## 2024-02-07 ASSESSMENT — PAIN SCALES - GENERAL
PAINLEVEL: MODERATE PAIN (4)
PAINLEVEL: MODERATE PAIN (4)

## 2024-02-07 NOTE — NURSING NOTE
24 gauge Peripheral IV inserted into right hand - attempts: 1-per Elizabeth MACK RN     Discharge Information    IV Discontiued Time:  NA    Amount of Fluid Infused:  NA    Discharge Criteria = When patient returns to baseline or as per MD order    Consciousness:  Pt is fully awake    Circulation:  BP +/- 20% of pre-procedure level    Respiration:  Patient is able to breathe deeply    O2 Sat:  Patient is able to maintain O2 Sat >92% on room air    Activity:  Moves 4 extremities on command    Ambulation:  Patient is able to stand and walk or stand and pivot into wheelchair    Dressing:  Clean/dry     Notes:   Discharge instructions and AVS given to patient    Patient meets criteria for discharge?  YES    Admitted to PCU?  No    Responsible adult present to accompany patient home?  Yes    Signature/Title:    ANNA GARCIA, RN  RN Care Coordinator  [facility]

## 2024-02-07 NOTE — NURSING NOTE
Pre-procedure Intake  If YES to any questions or NO to having a   Please complete laminated checklist and leave on the computer keyboard for Provider, verbally inform provider if able.    For SCS Trial, RFA's or any sedation procedure:  Have you been fasting? NA  If yes, for how long?     Are you taking any any blood thinners such as Coumadin, Warfarin, Jantoven, Pradaxa Xarelto, Eliquis, Edoxaban, Enoxaparin, Lovenox, Heparin, Arixtra, Fondaparinux, or Fragmin? OR Antiplatelet medication such as Plavix, Brilinta, or Effient?   Yes -   Plavix   If yes, when did you take your last dose? 01/31/2024    Do you take aspirin?  No  If cervical procedure, have you held aspirin for 6 days?   NA    Do you have any allergies to contrast dye, iodine, steroid and/or numbing medications?  NO    Are you currently taking antibiotics or have an active infection?  NO    Have you had a fever/elevated temperature within the past week? NO    Are you currently taking oral steroids? NO    Do you have a ? Yes    Are you pregnant or breastfeeding?  NO    Have you received the COVID-19 vaccine? Yes  If yes, was it your 1st, 2nd or only dose needed? 2nd dose and booster  Date of most recent vaccine: 10/14/2023    Notify provider and RNs if systolic BP >170, diastolic BP >100, P >100 or O2 sats < 90%

## 2024-02-07 NOTE — PROGRESS NOTES
DISCHARGE  Reason for Discharge: Patient chooses to discontinue therapy.  Patient has failed to schedule further appointments.    Equipment Issued: none    Discharge Plan: Patient to continue home program.  Pt to receive new referral for PT from doctor to schedule more PT appointments.    Referring Provider:  Miguel Day       05/17/23 0500   Appointment Info   Signing clinician's name / credentials Rachele Richardson, PT, DPT   Visits Used 4/4   Progress Note/Certification   Progress Note Due Date 05/05/23   Progress Note Completed Date 05/10/23       Present No   GOALS   PT Goals 2;3   PT Goal 1   Goal Identifier HEP   Goal Description Pt will be independent with HEP to improve mobility and decrease pain.   Goal Progress Progressing   Target Date 06/01/23   PT Goal 2   Goal Identifier Sleep   Goal Description Pt will report ability to sleep greater than 4 hours with less than 2/10 neck pain to improve functional mobility and improve quality of life.   Goal Progress Progressing   Target Date 06/01/23   PT Goal 3   Goal Identifier Putting on clothes   Goal Description Pt will report ability to put on clothes with less than 2/10 neck pain to improve functional mobility and increase tolerance completing ADL's.   Goal Progress Progressing   Target Date 06/01/23   Subjective Report   Subjective Report Pt reports that he has been still experiencing neck and shoulder pain. Pt reports that pt reports pain in neck. Pt reports that he hasn't been feeling any better since last week. Pt reports that he will be getting an MRI later today. Pt reports that he still has tingling and when up in morning it tingles.   Treatment Interventions (PT)   Interventions Therapeutic Procedure/Exercise;Manual Therapy   Therapeutic Procedure/Exercise   Therapeutic Procedures: strength, endurance, ROM, flexibillity minutes (76395) 15   Skilled Intervention Education on exercises for HEP. Verbal cues, tactile cues, and  demonstration of correct techniques. Pt educated to complete exercises within pain. Pt educated to stop exercises if cause increase in pain.   Patient Response/Progress Tolerated well   Ther Proc 1 Review over HEP, review over HEP, education on pulleys, pulleys x 2 minutes, pendulums x 1 minute, counter stretch x 30 second holds   Manual Therapy   Manual Therapy: Mobilization, MFR, MLD, friction massage minutes (03038) 25   Skilled Intervention STM/massage/manual therapy; pt in supine with head supported on pillow and bolster under knees, assist with supine<>sit transfers.   Patient Response/Progress Tolerated well, when sitting back up pt reports slight improvement in pain   Treatment Detail STM/massage to L upper rhomboid musculature, pt reports tenderness upon palpation, STM/massage of upper trapezius musculature   Education   Learner/Method Patient;Significant Other   Plan   Homework PTRx   Home program Upper trapezius stretch, levator scapulae stretch, rhomboid stretch, supine cervical retraction, shoulder rolls, scapular retraction/depression, nerve glides, pulley shoulder flexion   Plan for next session Leave PT episode open at this time, pt to call back to make more appts,Scapular strengthening and stretching; therapeutic exercise: strengthening, flexibility, ROM; neuro re-ed; STM/massage/manual therapy   Comments   Comments Pt is a 89 year old male who presents to physical therapy with reports of left sided neck pain with L shoulder pain. Pt continues to experience pain throughout the day. Pt would benefit from physical therapy to improve ROM, increase strength and decrease pain to improve functional mobility and increase tolerance completing ADL's, recreational activities and work related tasks.   Total Session Time   Timed Code Treatment Minutes 40   Total Treatment Time (sum of timed and untimed services) 40   Medicare Claim Information   Medical Diagnosis Myofascial pain; cervical spondylosis without  myelopathy   PT Diagnosis L sided neck pain with L shoulder pain   Start of Care Date 04/05/23   Onset date of current episode/exacerbation 03/07/23   Certification date from 04/05/23       Rachele Richardson, PT, DPT

## 2024-02-07 NOTE — PATIENT INSTRUCTIONS
[facility]   Procedure Discharge Instructions    Today you saw:       Dr. Tran    You had an: cervical epidural steroid injection     Medications used:  Lidocaine  Dexamethasone Omnipaque            If you were holding your blood thinning medication, please restart taking it: N/A  Be cautious when walking. Numbness and/or weakness in the lower extremities may occur for up to 6-8 hours after the procedure due to effect of the local anesthetic  Do not drive for 6 hours. The effect of the local anesthetic could slow your reflexes.   You may resume your regular activities after 24 hours  Avoid strenuous activity for the first 24 hours  You may shower, however avoid swimming, tub baths or hot tubs for 24 hours following your procedure  You may have a mild to moderate increase in pain for several days following the injection.  It may take up to 14 days for the steroid medication to start working although you may feel the effect as early as a few days after the procedure.     You may use ice packs for 10-15 minutes, 3 to 4 times a day at the injection site for comfort  Do not use heat to painful areas for 6 to 8 hours. This will give the local anesthetic time to wear off and prevent you from accidentally burning your skin.   Unless you have been directed to avoid the use of anti-inflammatory medications (NSAIDS), you may use medications such as ibuprofen, Aleve or Tylenol for pain control if needed.   If you were fasting, you may resume your normal diet and medications after the procedure  If you have diabetes, check your blood sugar more frequently than usual as your blood sugar may be higher than normal for 10-14 days following a steroid injection. Contact your doctor who manages your diabetes if your blood sugar is higher than usual  Possible side effects of steroids that you may experience include flushing, elevated blood pressure, increased appetite, mild headaches and  restlessness.  All of these symptoms will get better with time.  If you experience any of the following, call the Pain Clinic at [phone]:  -Fever over 100 degree F  -Swelling, bleeding, redness, drainage, warmth at the injection site  -Progressive weakness or numbness in your legs or arms  -Loss of bowel or bladder function  -Unusual headache that is not relieved by Tylenol or other pain reliever  -Unusual new onset of pain that is not improving

## 2024-02-07 NOTE — PROGRESS NOTES
Pre procedure Diagnosis: Cervical Radiculopathy   Post procedure Diagnosis: Same  Procedure performed: C7-T1 interlaminar epidural steroid injection, CPT code 75220  Anesthesia: none  Complications: none immediately noted  Operators: Abilio Tran MD; Emily Pak MD    Indications:   Balwinder Huang is a 89 year old male was sent by Dr. Tran for a cervical epidural steroid injection. During the consent process, this procedure was determined to be an appropriate intervention for the patient's symptoms.    Options/alternatives, benefits and risks were discussed with the patient including bleeding, infection, no pain relief, tissue trauma, exposure to radiation, reaction to medications, spinal cord injury, dural puncture, weakness, numbness and headache.  Questions were answered to his satisfaction and he agrees to proceed. Voluntary informed consent was obtained and signed.     Vitals were reviewed: Yes  Allergies were reviewed:  Yes   Medications were reviewed:  Yes   Pre-procedure pain score: 4/10    Procedure:  After getting informed consent, patient was brought into the procedure suite and was placed in a prone position on the procedure table.   A procedural pause was performed.  Patient was prepped and draped in sterile fashion.     The C7-T1 interspace was identified with AP fluoroscopy.  A total of 2 ml of 1% lidocaine was used to anesthetize the skin for a left paramedian approach.    A 22gauge 3.5inch Touhy needle was advanced under intermittent fluoroscopy.  A LUNA syringe was used to advance the needle into the epidural space.   After loss of resistance, there was no evidence of blood or CSF on aspiration. Location was verified with both AP and either contralateral oblique or lateral fluoroscopic imaging.    A total of 1ml of Omnipaque 180 was injected demonstrating appropriate epidural spread and was checked in both the AP and lateral views. There was no evidence of intravascular or intrathecal  spread.    A mixture of 10mg of dexamethasone and 2ml of preservative free saline was injected.  The needle was removed from the epidural space and fully removed from the skin.     Hemostasis was achieved, the area was cleaned, and bandaids were placed when appropriate.  The patient tolerated the procedure well, and was taken to the recovery room.    Images were saved to PACS.    Post-procedure pain score: 4/10  Follow-up includes:   -f/u with referring provider    Roberto Pak MD  Interventional Pain Fellow  [facility]     Physician Attestation   I, Abilio Tran MD, was present for all key and critical portions of the procedure, and I was immediately available during the remainder of the procedure.  Any changes to the documentation have been made above.    Abilio Tran MD  Interventional Pain Medicine  [facility]

## 2024-02-08 PROBLEM — M67.912 TENDINOPATHY OF LEFT ROTATOR CUFF: Status: RESOLVED | Noted: 2023-11-09 | Resolved: 2024-02-08

## 2024-02-08 NOTE — PROGRESS NOTES
DISCHARGE  Reason for Discharge: Patient has met all goals.  Patient chooses to discontinue therapy.    Equipment Issued: none    Discharge Plan: Patient to continue home program.  Pt to receive new referral for PT from doctor to schedule more PT appointments.    Referring Provider:  Abilio Tran       01/08/24 0500   Appointment Info   Signing clinician's name / credentials Rachele Richardson, PT, DPT   Visits Used 5   Medical Diagnosis Tendinopathy of left rotator cuff   PT Tx Diagnosis Left shoulder pain   Quick Adds Certification   Progress Note/Certification   Start of Care Date 10/30/23   Onset of illness/injury or Date of Surgery 10/12/23   Therapy Frequency 1 time a week   Predicted Duration 1 time a week or every other week for a minimum of 12 weeks, minimum of 6 sessions   Certification date from 10/30/23   Certification date to 01/21/24   Progress Note Due Date 11/28/23  (or 10th visit)   GOALS   PT Goals 2;3   PT Goal 1   Goal Identifier HEP   Goal Description Pt will be independent with HEP to improve mobility and decrease pain.   Goal Progress Goal Met   Target Date 01/21/24   Date Met 01/08/24   PT Goal 2   Goal Identifier Dressing   Goal Description Pt will report ability to get dressed with less than 2/10 pain to improve functional mobility and improve quality of life.   Goal Progress Goal Met- Pt reports is able to get dressed with minimal pain   Target Date 01/21/24   Date Met 01/08/24   PT Goal 3   Goal Identifier Riding in car   Goal Description Pt will report less than 2/10 pain when riding in car to improve quality of life.   Goal Progress Goal Met- pt reports minimal pain when riding in car   Target Date 01/21/24   Date Met 01/08/24   Subjective Report   Subjective Report Pt reports that he has been doing well. Pt reports that he has been completing his exercises and has had improvement in his shoulder ROM and pain. Pt reports that he is okay with discharging from PT today. Pt will receive  new referral for PT from doctor to schedule more PT appointments.   Treatment Interventions (PT)   Interventions Therapeutic Procedure/Exercise;Manual Therapy   Therapeutic Procedure/Exercise   Therapeutic Procedures: strength, endurance, ROM, flexibillity minutes (22593) 5   Ther Proc 1 To improve functional mobility: pulleys x 5 minutes- pt demonstrates improved L shoulder ROM with use of pulleys, review over HEP   Skilled Intervention Education on exercises for HEP. Verbal cues, tactile cues and demonstration of correct technique. Pt educated to complete exercises within pain. Pt educated to stop exercises if cause increase in pain. Pt educated to trial pillow under left arm to support arm when riding in car.   Patient Response/Progress Tolerated well   Manual Therapy   Manual Therapy: Mobilization, MFR, MLD, friction massage minutes (91344) 20   Manual Therapy 1 Pt in supine with head supported on pillows. L arm supported by PT. Massage to left biceps musculature, ER/teres major musculature, pt reports tenderness upon palpation/massage of biceps and ER musculature   Skilled Intervention STM/massage/manual therapy   Patient Response/Progress Tolerated well   Education   Learner/Method Patient;Significant Other;Demonstration;Pictures/Video   Plan   Home program PTRx   Plan for next session Discharge PT   Comments   Comments Pt is a 89 year old male who presents to physical therapy with reports of improved left shoulder pain. Pt has met PT goals. Pt to be discharged from PT. Pt to receive new referral from PT to schedule more PT appointments.   Total Session Time   Timed Code Treatment Minutes 25   Total Treatment Time (sum of timed and untimed services) 25     Rachele Richardson, PT, DPT

## 2024-02-12 DIAGNOSIS — I25.10 CARDIOVASCULAR DISEASE: ICD-10-CM

## 2024-02-12 RX ORDER — CLOPIDOGREL BISULFATE 75 MG/1
75 TABLET ORAL DAILY
Qty: 90 TABLET | Refills: 1 | OUTPATIENT
Start: 2024-02-12

## 2024-03-05 ENCOUNTER — TELEPHONE (OUTPATIENT)
Dept: PALLIATIVE MEDICINE | Facility: OTHER | Age: 89
End: 2024-03-05
Payer: COMMERCIAL

## 2024-03-05 NOTE — TELEPHONE ENCOUNTER
Preprocedure appointment reminder for Bursa injection    Left a message    Arrival time 12.45pm    Location: [address]    Has the patient had a flu shot or any other vaccinations within the past 7 days? check    If yes, explain that for the vaccine to work best they need to:              wait 1 week before and 1 week after getting any Vaccine           wait 1 week before and 2 weeks after getting any Covid Vaccine    If patient has concerns about the timing, send to RN pool      Have you had any oral steroids or antibiotics in the last five days? check    Its ok to eat and drink as usual and take your  BP and diabetes medications if this applies to you.     To call and reschedule or talk to the nurse about any questions you may have please dial    [phone]

## 2024-03-06 ENCOUNTER — RADIOLOGY INJECTION OFFICE VISIT (OUTPATIENT)
Dept: PALLIATIVE MEDICINE | Facility: OTHER | Age: 89
End: 2024-03-06
Attending: STUDENT IN AN ORGANIZED HEALTH CARE EDUCATION/TRAINING PROGRAM
Payer: COMMERCIAL

## 2024-03-06 VITALS — SYSTOLIC BLOOD PRESSURE: 122 MMHG | HEART RATE: 88 BPM | OXYGEN SATURATION: 96 % | DIASTOLIC BLOOD PRESSURE: 57 MMHG

## 2024-03-06 DIAGNOSIS — M54.12 CERVICAL RADICULOPATHY: ICD-10-CM

## 2024-03-06 DIAGNOSIS — M67.912 TENDINOPATHY OF LEFT ROTATOR CUFF: Primary | ICD-10-CM

## 2024-03-06 PROCEDURE — 20610 DRAIN/INJ JOINT/BURSA W/O US: CPT | Mod: LT | Performed by: STUDENT IN AN ORGANIZED HEALTH CARE EDUCATION/TRAINING PROGRAM

## 2024-03-06 PROCEDURE — 250N000011 HC RX IP 250 OP 636: Performed by: STUDENT IN AN ORGANIZED HEALTH CARE EDUCATION/TRAINING PROGRAM

## 2024-03-06 PROCEDURE — 20610 DRAIN/INJ JOINT/BURSA W/O US: CPT | Performed by: STUDENT IN AN ORGANIZED HEALTH CARE EDUCATION/TRAINING PROGRAM

## 2024-03-06 PROCEDURE — 96372 THER/PROPH/DIAG INJ SC/IM: CPT | Performed by: STUDENT IN AN ORGANIZED HEALTH CARE EDUCATION/TRAINING PROGRAM

## 2024-03-06 RX ORDER — TRIAMCINOLONE ACETONIDE 40 MG/ML
40 INJECTION, SUSPENSION INTRA-ARTICULAR; INTRAMUSCULAR ONCE
Status: COMPLETED | OUTPATIENT
Start: 2024-03-06 | End: 2024-03-06

## 2024-03-06 RX ORDER — BUPIVACAINE HYDROCHLORIDE 5 MG/ML
4 INJECTION, SOLUTION EPIDURAL; INTRACAUDAL ONCE
Status: COMPLETED | OUTPATIENT
Start: 2024-03-06 | End: 2024-03-06

## 2024-03-06 RX ADMIN — TRIAMCINOLONE ACETONIDE 40 MG: 40 INJECTION, SUSPENSION INTRA-ARTICULAR; INTRAMUSCULAR at 13:18

## 2024-03-06 RX ADMIN — BUPIVACAINE HYDROCHLORIDE 20 MG: 5 INJECTION, SOLUTION EPIDURAL; INTRACAUDAL; PERINEURAL at 13:18

## 2024-03-06 ASSESSMENT — PAIN SCALES - GENERAL
PAINLEVEL: MILD PAIN (2)
PAINLEVEL: MILD PAIN (2)

## 2024-03-06 NOTE — PATIENT INSTRUCTIONS
[facility]  Post Procedure Instructions    Today you saw:  Dr. Tran    Today you had:  left  subacromial bursa injection      Medications used:  ropivacaine  kenolog        Go to the emergency room if you develop any shortness of breath  Monitor the injection sites for signs and symptoms of infection-fever, chills, redness, swelling, warmth, or drainage to areas.  You may have soreness at injection sites for up to 24 hours.  It may take up to 14 days for the steroid medication to start working although you may feel the effect as early as a few days after the procedure.     You may apply ice to the painful areas to help minimize the discomfort of the needle pokes.  Do not apply heat to sites for at least 12 hours.  You may use anti-inflammatory medications or Tylenol for pain control if necessary    Pain Clinic phone number:  [phone]

## 2024-03-06 NOTE — PROGRESS NOTES
Pre procedure Diagnosis: Left shoulder pain  Post procedure Diagnosis: Same  Procedure performed: left subacromial bursa injection  Anesthesia: none  Complications: none  Operators: Abilio Tran MD; Emily Pak MD    Indications:   Balwinder Huang is a 89 year old male was sent by Dr. Tran for left subacromial bursa injection.      Options/alternatives, benefits and risks were discussed with the patient including bleeding, infection, tissue trauma, reaction to medications including seizure, nerve injury, weakness, and numbness.  Questions were answered to his  satisfaction and she agrees to proceed. Voluntary informed consent was obtained and signed.     Vitals were reviewed: Yes  Allergies were reviewed:  Yes   Medications were reviewed:  Yes   Pre-procedure pain score: 2/10    Procedure:  After getting informed consent, patient was placed on a chair in an upright position. The acromion was palpated posteriorly and a site immediately inferior was noted or marked for needle entry.    The area was then prepped with chlorhexadine in preparation for the procedure to be performed using clean technique.    A 25 gauge 1.5 inch needle was advanced immediately inferior to the posterior acromion. Upon successful entry, a mixture containing 3 ml of 0.5% bupivacaine with 40mg of kenalog was injected into the subacromial space. The needle was removed.     Hemostasis was achieved, the area was cleaned, and bandaids were placed when appropriate.  The patient tolerated the procedure well, and was kept in the recovery room.    Post-procedure pain score: 2/10  Follow-up includes:   -f/u with referring provider    Roberto Pak MD  Interventional Pain Fellow  [facility]       Physician Attestation   I, Abilio Tran MD, was present for all key and critical portions of the procedure, and I was immediately available during the remainder of the procedure.  Any changes to the documentation have been made  above.    Abilio Tran MD  Interventional Pain Medicine  [facility]

## 2024-03-06 NOTE — PROGRESS NOTES
Pre-procedure Intake  If YES to any questions or NO to having a   Please complete laminated checklist and leave on the computer keyboard for Provider, verbally inform provider if able.      Are you taking any any blood thinners such as Coumadin, Warfarin, Jantoven, Pradaxa Xarelto, Eliquis, Edoxaban, Enoxaparin, Lovenox, Heparin, Arixtra, Fondaparinux, or Fragmin? OR Antiplatelet medication such as Plavix, Brilinta, or Effient?   Yes -   Plavix   If yes, when did you take your last dose? 5 days ago    Do you take aspirin?  No  If cervical procedure, have you held aspirin for 6 days?   NA    Do you have any allergies to contrast dye, iodine, steroid and/or numbing medications?  NO    Are you currently taking antibiotics or have an active infection?  NO    Have you had a fever/elevated temperature within the past week? NO    Are you currently taking oral steroids? NO    Do you have a ? Yes    Are you pregnant or breastfeeding?  Not Applicable    Have you received the COVID-19 vaccine? Yes  If yes, was it your 1st, 2nd or only dose needed? 6  Date of most recent vaccine: 10.14.2023    Notify provider and RNs if systolic BP >170, diastolic BP >100, P >100 or O2 sats < 90%

## 2024-03-22 ENCOUNTER — TRANSFERRED RECORDS (OUTPATIENT)
Dept: HEALTH INFORMATION MANAGEMENT | Facility: CLINIC | Age: 89
End: 2024-03-22
Payer: COMMERCIAL

## 2024-04-17 ASSESSMENT — PAIN SCALES - PAIN ENJOYMENT GENERAL ACTIVITY SCALE (PEG)
INTERFERED_GENERAL_ACTIVITY: 1
AVG_PAIN_PASTWEEK: 2
PEG_TOTALSCORE: INCOMPLETE
INTERFERED_GENERAL_ACTIVITY: 1

## 2024-04-18 ENCOUNTER — OFFICE VISIT (OUTPATIENT)
Dept: PALLIATIVE MEDICINE | Facility: OTHER | Age: 89
End: 2024-04-18
Attending: STUDENT IN AN ORGANIZED HEALTH CARE EDUCATION/TRAINING PROGRAM
Payer: COMMERCIAL

## 2024-04-18 VITALS — OXYGEN SATURATION: 97 % | SYSTOLIC BLOOD PRESSURE: 101 MMHG | HEART RATE: 90 BPM | DIASTOLIC BLOOD PRESSURE: 54 MMHG

## 2024-04-18 DIAGNOSIS — M79.18 MYOFASCIAL PAIN: ICD-10-CM

## 2024-04-18 DIAGNOSIS — M67.912 TENDINOPATHY OF LEFT ROTATOR CUFF: ICD-10-CM

## 2024-04-18 DIAGNOSIS — M54.12 CERVICAL RADICULOPATHY: Primary | ICD-10-CM

## 2024-04-18 PROCEDURE — 99214 OFFICE O/P EST MOD 30 MIN: CPT | Performed by: STUDENT IN AN ORGANIZED HEALTH CARE EDUCATION/TRAINING PROGRAM

## 2024-04-18 PROCEDURE — G0463 HOSPITAL OUTPT CLINIC VISIT: HCPCS | Performed by: STUDENT IN AN ORGANIZED HEALTH CARE EDUCATION/TRAINING PROGRAM

## 2024-04-18 NOTE — PATIENT INSTRUCTIONS
Procedures:   Can repeat C-ILESI no sooner than 3 months from prior  Can repeat left Subacromial bursa injection no sooner than 3 months from prior  Left suprascapular nerve block and/or PNS can be considered for the future  Physical Therapy: continue HEP. Avoid compensating with left trapezium to move left shoulder.  Diagnostic Studies:   Last MRI Cervical spine 5/17/23  Medication Management:   Continue tylenol  Continue methocarbamol 500mg up to 4 times a day as needed   No Flexeril, tizanidine, or baclofen due to known CNS depression/sedation effects, which could result in falls.  Use advil sparingly  Follow up: 3 months    Abilio Tran MD  Interventional Pain Medicine  [facility]

## 2024-04-18 NOTE — PROGRESS NOTES
Patient presents to the clinic today for a visit with Abilio Tran MD regarding Pain Management.          10/12/2023    11:25 AM 1/11/2024     2:15 PM 4/18/2024     3:27 PM   PEG Score   PEG Total Score 6.67 4.33 0.67           Notes no stabbing pain but there is soreness. He has hardly any pain. It is not interfering with enjoyment or activities    Hortensia Watkins  [facility] Clinical Assistant

## 2024-04-18 NOTE — PROGRESS NOTES
[facility] Follow-up    Date of visit: 04/18/2024    Chief complaint:   Chief Complaint   Patient presents with    Pain       Interval history:  Since his last visit, Balwinder Huang reports:  Improvement from left subacromial bursa injection + PT  Improvement from C-ILESI    Patient reports great benefit form both procedures.     Pain scores:  Pain intensity currently is 1-2  on a scale of 0-10 for both neck and shoulder    Current pain treatments:   Aleve - helpful     Other relevant meds:  Plavix     Previous medication treatments included:  Methocarbamol 500mg QID PRN - somewhat helpful for the shoulder  Tylenol - helpful  Advil - helping       Other treatments have included:  Balwinder Huang has not been seen at a pain clinic in the past.  For right hip  PT: undergoing - doing HEP. Helpful  Injections:               7/6//23 - C-ILESI - 70% relief, ongoing              3/29/23 TPI left neck, thoracic paraspinals, rhomboids, levator scapula - helped for 1 day  Surgery: none  Alternative: chiropractor - has been going, helped a little    Side Effects: no side effect    Medications:  Current Outpatient Medications   Medication Sig Dispense Refill    clopidogrel (PLAVIX) 75 MG tablet Take 1 tablet (75 mg) by mouth daily 90 tablet 1    multivitamin therapeutic (THERAGRAN) tablet [MULTIVITAMIN THERAPEUTIC (THERAGRAN) TABLET] Take 1 tablet by mouth daily.      nitroGLYcerin (NITROSTAT) 0.4 MG sublingual tablet nitroglycerin 0.4 mg sublingual tablet  PLACE ONE TABLET UNDER TONGUE AS NEEDED FOR CHEST PAIN EVERY 5 MINUTES AS DIRECTED 10 tablet 0    oxyBUTYnin ER (DITROPAN XL) 15 MG 24 hr tablet Take 1 tablet (15 mg) by mouth daily 90 tablet 3    simvastatin (ZOCOR) 20 MG tablet Take 1 tablet (20 mg) by mouth at bedtime 90 tablet 3    tamsulosin (FLOMAX) 0.4 MG capsule TAKE 2 CAPSULES(0.8 MG) BY MOUTH DAILY AFTER BREAKFAST 180 capsule 3       Medical History: any changes  in medical history since they were last seen? No    Physical Exam:   Blood pressure 101/54, pulse 90, SpO2 97%.  Constitutional: Well developed, NAD  Head: normocephalic. Atraumatic.   Eyes: no redness or jaundice noted   CV: warm, well perfused extremities   Skin: no suspicious lesions or rashes   Psychiatric: mentation appears normal and affect full  MSK:  Neck with ROM full for flexion, extension is 2 degrees, rotation to the right 30 and lateralization to the right 30 limited due to tightness in left trapezium, rotation to left and lateralization to the left normal  TTP on left mid trapezium  Left shoulder with mild TTP on bicippital groove. Passive ROM with abduction and flexion up to 7- degrees. Active ROM with abduction and flexion of 10-20 degrees, full rotations with shoulder in neutral position. Patient over compensates with left trapezium when trying to do abduction and flexion of left shoulder.    Diagnostics:  none    Personally reviewed: N/A    Assessment:   Cervical spondylosis  Myofascial pain  Left rotator cuff tendinopathy     Balwinder Huang is a 89 year old male who presents for follow up of his left-sided neck pain and shoulder pain.  We have been treating both lumbar radiculopathy and left rotator cuff tendinopathy.  Cervical GERARD's have been significantly helpful, and the most recent subacromial bursa injection on the left was also quite helpful, in conjunction with physical therapy.  Patient notes functional improvements, and the wife has noted less reporting of pain while performing activities.  Patient is pleased with interventional management, and would like to continue.  We repeated both the cervical epidural steroid injection as well as the left subacromial bursa injection with great relief of pain, now tolerating well exercises for neck and shoulder, taking oral medications occasionally. Plan is for patient to return for repeat injection as needed if pain returns.  I have encouraged the  patient to continue with home exercise program at home as he is doing. We will see him back in 3 months.     Plan:   Diagnosis reviewed, treatment option addressed, and risk/benefits discussed.      Procedures:   Can repeat C-ILESI no sooner than 3 months from prior  Can repeat left Subacromial bursa injection no sooner than 3 months from prior  Left suprascapular nerve block and/or PNS can be considered for the future  Physical Therapy: continue HEP. Avoid compensating with left trapezium to move left shoulder.  Diagnostic Studies:   Last MRI Cervical spine 5/17/23  Medication Management:   Continue tylenol  Continue methocarbamol 500mg up to 4 times a day as needed   No Flexeril, tizanidine, or baclofen due to known CNS depression/sedation effects, which could result in falls.  Use advil sparingly  Follow up: 3 months     Patient seen and discussed with attending physician Dr. Tran.     Paola Toussaint Gonzalez, MD  Resident Physician PM&R, PGY-2    Physician Attestation   I, Abilio Tran MD, saw and evaluated this patient and agree with the findings and plan of care as documented in the note.  Any changes have been made above.    Abilio Tran MD  Interventional Pain Medicine  [facility]

## 2024-06-22 ENCOUNTER — HOSPITAL ENCOUNTER (EMERGENCY)
Facility: HOSPITAL | Age: 89
Discharge: HOME | End: 2024-06-22
Attending: STUDENT IN AN ORGANIZED HEALTH CARE EDUCATION/TRAINING PROGRAM | Admitting: STUDENT IN AN ORGANIZED HEALTH CARE EDUCATION/TRAINING PROGRAM
Payer: COMMERCIAL

## 2024-06-22 ENCOUNTER — APPOINTMENT (OUTPATIENT)
Dept: CT IMAGING | Facility: HOSPITAL | Age: 89
End: 2024-06-22
Attending: STUDENT IN AN ORGANIZED HEALTH CARE EDUCATION/TRAINING PROGRAM
Payer: COMMERCIAL

## 2024-06-22 VITALS
HEIGHT: 66 IN | BODY MASS INDEX: 27 KG/M2 | SYSTOLIC BLOOD PRESSURE: 118 MMHG | TEMPERATURE: 97.6 F | OXYGEN SATURATION: 94 % | WEIGHT: 168 LBS | RESPIRATION RATE: 30 BRPM | DIASTOLIC BLOOD PRESSURE: 56 MMHG | HEART RATE: 95 BPM

## 2024-06-22 DIAGNOSIS — M54.16 LUMBAR RADICULOPATHY: ICD-10-CM

## 2024-06-22 LAB
ANION GAP SERPL CALCULATED.3IONS-SCNC: 9 MMOL/L (ref 7–15)
BASOPHILS # BLD AUTO: 0.1 10E3/UL (ref 0–0.2)
BASOPHILS NFR BLD AUTO: 1 %
BUN SERPL-MCNC: 23.5 MG/DL (ref 8–23)
CALCIUM SERPL-MCNC: 9 MG/DL (ref 8.2–9.6)
CHLORIDE SERPL-SCNC: 105 MMOL/L (ref 98–107)
CREAT SERPL-MCNC: 0.88 MG/DL (ref 0.67–1.17)
DEPRECATED HCO3 PLAS-SCNC: 26 MMOL/L (ref 22–29)
EGFRCR SERPLBLD CKD-EPI 2021: 80 ML/MIN/1.73M2
EOSINOPHIL # BLD AUTO: 0.2 10E3/UL (ref 0–0.7)
EOSINOPHIL NFR BLD AUTO: 3 %
ERYTHROCYTE [DISTWIDTH] IN BLOOD BY AUTOMATED COUNT: 13.7 % (ref 10–15)
GLUCOSE SERPL-MCNC: 117 MG/DL (ref 70–99)
HCT VFR BLD AUTO: 34.3 % (ref 40–53)
HGB BLD-MCNC: 11.5 G/DL (ref 13.3–17.7)
IMM GRANULOCYTES # BLD: 0.1 10E3/UL
IMM GRANULOCYTES NFR BLD: 1 %
LYMPHOCYTES # BLD AUTO: 3.5 10E3/UL (ref 0.8–5.3)
LYMPHOCYTES NFR BLD AUTO: 39 %
MCH RBC QN AUTO: 33.2 PG (ref 26.5–33)
MCHC RBC AUTO-ENTMCNC: 33.5 G/DL (ref 31.5–36.5)
MCV RBC AUTO: 99 FL (ref 78–100)
MONOCYTES # BLD AUTO: 1.5 10E3/UL (ref 0–1.3)
MONOCYTES NFR BLD AUTO: 16 %
NEUTROPHILS # BLD AUTO: 3.8 10E3/UL (ref 1.6–8.3)
NEUTROPHILS NFR BLD AUTO: 41 %
NRBC # BLD AUTO: 0 10E3/UL
NRBC BLD AUTO-RTO: 0 /100
PLATELET # BLD AUTO: 278 10E3/UL (ref 150–450)
POTASSIUM SERPL-SCNC: 4.4 MMOL/L (ref 3.4–5.3)
RBC # BLD AUTO: 3.46 10E6/UL (ref 4.4–5.9)
SODIUM SERPL-SCNC: 140 MMOL/L (ref 135–145)
WBC # BLD AUTO: 9.1 10E3/UL (ref 4–11)

## 2024-06-22 PROCEDURE — 74177 CT ABD & PELVIS W/CONTRAST: CPT

## 2024-06-22 PROCEDURE — 99285 EMERGENCY DEPT VISIT HI MDM: CPT | Mod: 25

## 2024-06-22 PROCEDURE — 85025 COMPLETE CBC W/AUTO DIFF WBC: CPT | Performed by: STUDENT IN AN ORGANIZED HEALTH CARE EDUCATION/TRAINING PROGRAM

## 2024-06-22 PROCEDURE — 80048 BASIC METABOLIC PNL TOTAL CA: CPT | Performed by: STUDENT IN AN ORGANIZED HEALTH CARE EDUCATION/TRAINING PROGRAM

## 2024-06-22 PROCEDURE — 250N000011 HC RX IP 250 OP 636: Performed by: STUDENT IN AN ORGANIZED HEALTH CARE EDUCATION/TRAINING PROGRAM

## 2024-06-22 PROCEDURE — 36415 COLL VENOUS BLD VENIPUNCTURE: CPT | Performed by: STUDENT IN AN ORGANIZED HEALTH CARE EDUCATION/TRAINING PROGRAM

## 2024-06-22 PROCEDURE — 250N000013 HC RX MED GY IP 250 OP 250 PS 637: Performed by: STUDENT IN AN ORGANIZED HEALTH CARE EDUCATION/TRAINING PROGRAM

## 2024-06-22 PROCEDURE — 999N000104 CT LUMBAR SPINE RECONSTRUCTED

## 2024-06-22 RX ORDER — LIDOCAINE 4 G/G
1 PATCH TOPICAL
Status: DISCONTINUED | OUTPATIENT
Start: 2024-06-22 | End: 2024-06-22 | Stop reason: HOSPADM

## 2024-06-22 RX ORDER — CYCLOBENZAPRINE HCL 5 MG
5 TABLET ORAL ONCE
Status: COMPLETED | OUTPATIENT
Start: 2024-06-22 | End: 2024-06-22

## 2024-06-22 RX ORDER — CYCLOBENZAPRINE HCL 10 MG
5 TABLET ORAL 3 TIMES DAILY PRN
Qty: 15 TABLET | Refills: 0 | Status: SHIPPED | OUTPATIENT
Start: 2024-06-22 | End: 2024-07-02

## 2024-06-22 RX ORDER — ACETAMINOPHEN 325 MG/1
975 TABLET ORAL ONCE
Status: COMPLETED | OUTPATIENT
Start: 2024-06-22 | End: 2024-06-22

## 2024-06-22 RX ORDER — IOPAMIDOL 755 MG/ML
82 INJECTION, SOLUTION INTRAVASCULAR ONCE
Status: COMPLETED | OUTPATIENT
Start: 2024-06-22 | End: 2024-06-22

## 2024-06-22 RX ORDER — ACETAMINOPHEN 500 MG
1000 TABLET ORAL 3 TIMES DAILY
Qty: 60 TABLET | Refills: 0 | Status: SHIPPED | OUTPATIENT
Start: 2024-06-22

## 2024-06-22 RX ADMIN — LIDOCAINE 1 PATCH: 4 PATCH TOPICAL at 01:56

## 2024-06-22 RX ADMIN — ACETAMINOPHEN 975 MG: 325 TABLET ORAL at 01:56

## 2024-06-22 RX ADMIN — CYCLOBENZAPRINE HYDROCHLORIDE 5 MG: 5 TABLET, FILM COATED ORAL at 01:56

## 2024-06-22 RX ADMIN — IOPAMIDOL 82 ML: 755 INJECTION, SOLUTION INTRAVENOUS at 03:09

## 2024-06-22 RX ADMIN — OXYCODONE HYDROCHLORIDE 2.5 MG: 5 TABLET ORAL at 04:51

## 2024-06-22 ASSESSMENT — ACTIVITIES OF DAILY LIVING (ADL)
ADLS_ACUITY_SCORE: 35

## 2024-06-22 ASSESSMENT — COLUMBIA-SUICIDE SEVERITY RATING SCALE - C-SSRS
1. IN THE PAST MONTH, HAVE YOU WISHED YOU WERE DEAD OR WISHED YOU COULD GO TO SLEEP AND NOT WAKE UP?: NO
2. HAVE YOU ACTUALLY HAD ANY THOUGHTS OF KILLING YOURSELF IN THE PAST MONTH?: NO
6. HAVE YOU EVER DONE ANYTHING, STARTED TO DO ANYTHING, OR PREPARED TO DO ANYTHING TO END YOUR LIFE?: NO

## 2024-06-22 NOTE — ED NOTES
Bed: JNED-28  Expected date: 6/21/24  Expected time: 11:53 PM  Means of arrival: Ambulance  Comments:  [facility]  89 M--Left lower back pain, VSS

## 2024-06-22 NOTE — ED PROVIDER NOTES
EMERGENCY DEPARTMENT ENCOUNTER      NAME: Balwinder Huang  AGE: 89 year old male  YOB: 1931  MRN: [id number]  EVALUATION DATE & TIME: 6/22/2024 12:04 AM    PCP: Miguel Day    ED PROVIDER: Sami Marc MD      Chief Complaint   Patient presents with    Back Pain         FINAL IMPRESSION:  1. Lumbar radiculopathy          ED COURSE & MEDICAL DECISION MAKING:    Pertinent Labs & Imaging studies reviewed. (See chart for details)  89 year old male presents to the Emergency Department for evaluation of back pain/Flank pain    ED Course as of 06/22/24 0605   Sat Jun 22, 2024   0152 Patient is a 89-year-old male with history of hypertension, CAD, myofascial pain and nephrolithiasis who presents emergency department for evaluation of sudden onset left lower back pain and flank pain.  Patient states that started when he was trying to get out of bed earlier this evening.  Pain is exacerbated with movement and is very severe but largely resolves with resting.  Denies any recent fevers.  No recent injuries or trauma.  Denies any urinary symptoms.  No nausea or vomiting.  Denies any chest pain or shortness of breath.  Pain is mostly localized to the left lower back and left gluteal region does not radiate down the back of the leg.  He has had a history of back pain in the past but has never been quite this severe.  Denies any urinary incontinence or incontinence of stool.    On exam patient is well-appearing no acute distress.  Hemodynamically stable and afebrile.  Heart is regular rate and rhythm.  Abdomen soft benign without any pulsatile masses or focal tenderness.  Does have reproducible tenderness in the left paralumbar spinal musculature as well as the upper gluteal region.  Pain is exacerbated with flexion of the left hip and straightening of the left leg.  No midline tenderness.  No appreciable bruises or rashes.    Suspect possible musculoskeletal etiology such as sciatica or possible piriformis  syndrome.  However given age and relatively abrupt onset without inciting injury will obtain a CT abdomen pelvis to evaluate for possible referred pain.  Also obtain a CT lumbar spine.  Will attempt pain control.  No red flag signs of back pain at this point in time and low suspicion for cauda equina.   0354 Labs reviewed and interpreted myself.  No significant leukocytosis.  Mild anemia 11.5.  which is near the patient's baseline.  Slight elevation in BUN to creatinine ratio but overall baseline renal function.     CT abdomen pelvis Does not show any acute intracranial abnormality, nonobstructing renal calculi are seen in the left kidney but do not suspect these are causing the patient's pain CT lumbar spine does not show any acute fractures or traumatic malalignment.  There are some moderate degenerative changes and foraminal main narrowing which could be contributing to patient's symptoms.    Upon repeat evaluation patient has had some modest improvement in symptoms with cyclobenzaprine and acetaminophen.  He did well with a trial of ambulation with his walker in the emergency department here and feels like his pain is 1 of control to go home.  Will send him home with a short course of Flexeril for breakthrough pain recommend ongoing Tylenol.  Otherwise recommend follow-up with primary care doctor and if you develop significantly worsening back pain, fevers, incontinence of urine or other concerning symptoms return to the emergency department for repeat evaluation.      Medical Decision Making  Obtained supplemental history:Supplemental history obtained?: No  Reviewed external records: External records reviewed?: No  Care impacted by chronic illness:Hyperlipidemia and Hypertension  Care significantly affected by social determinants of health:N/A  Did you consider but not order tests?: Work up considered but not performed and documented in chart, if applicable  Did you interpret images independently?: Independent  interpretation of ECG and images noted in documentation, when applicable.  Consultation discussion with other provider:Did you involve another provider (consultant, , pharmacy, etc.)?: No  Discharge. I prescribed additional prescription strength medication(s) as charted. I considered admission, but discharged patient after significant clinical improvement.          At the conclusion of the encounter I discussed the results of all of the tests and the disposition. The questions were answered. The patient or family acknowledged understanding and was agreeable with the care plan.     0 minutes of critical care time     MEDICATIONS GIVEN IN THE EMERGENCY:  Medications   Lidocaine (LIDOCARE) 4 % Patch 1 patch (1 patch Transdermal $Patch/Med Applied 6/22/24 0156)   acetaminophen (TYLENOL) tablet 975 mg (975 mg Oral $Given 6/22/24 0156)   cyclobenzaprine (FLEXERIL) tablet 5 mg (5 mg Oral $Given 6/22/24 0156)   iopamidol (ISOVUE-370) solution 82 mL (82 mLs Intravenous $Given 6/22/24 0309)   oxyCODONE IR (ROXICODONE) half-tab 2.5 mg (2.5 mg Oral $Given 6/22/24 0451)       NEW PRESCRIPTIONS STARTED AT TODAY'S ER VISIT  Discharge Medication List as of 6/22/2024  4:51 AM        START taking these medications    Details   acetaminophen (TYLENOL) 500 MG tablet Take 2 tablets (1,000 mg) by mouth 3 times daily, Disp-60 tablet, R-0, E-Prescribe      cyclobenzaprine (FLEXERIL) 10 MG tablet Take 0.5 tablets (5 mg) by mouth 3 times daily as needed for muscle spasms, Disp-15 tablet, R-0, E-Prescribe                =================================================================    HPI    Patient information was obtained from: patient    Patient is a 89-year-old male with history of hypertension, CAD, myofascial pain and nephrolithiasis who presents emergency department for evaluation of sudden onset left lower back pain and flank pain.  Patient states that started when he was trying to get out of bed earlier this evening.  Pain is  exacerbated with movement and is very severe but largely resolves with resting.  Denies any recent fevers.  No recent injuries or trauma.  Denies any urinary symptoms.  No nausea or vomiting.  Denies any chest pain or shortness of breath.  Pain is mostly localized to the left lower back and left gluteal region does not radiate down the back of the leg.  He has had a history of back pain in the past but has never been quite this severe.  Denies any urinary incontinence or incontinence of stool.      REVIEW OF SYSTEMS   Refer to the [facility]    PAST MEDICAL HISTORY:  Past Medical History:   Diagnosis Date    History of transfusion     Hyperlipemia     Hypertension        PAST SURGICAL HISTORY:  Past Surgical History:   Procedure Laterality Date    APPENDECTOMY      IR LUMBAR EPIDURAL STEROID INJECTION  12/13/2006    IR LUMBAR EPIDURAL STEROID INJECTION  12/6/2007    JOINT REPLACEMENT Right     hip    [facility]ORY [facility]    TOTAL HIP ARTHROPLASTY Right     ZZC CABG, VEIN, SINGLE      Description: CABG (CABG);  Recorded: 08/04/2014;    [facility]      Description: Cath Stent Placement;  Recorded: 08/04/2014;           CURRENT MEDICATIONS:    acetaminophen (TYLENOL) 500 MG tablet  cyclobenzaprine (FLEXERIL) 10 MG tablet  clopidogrel (PLAVIX) 75 MG tablet  multivitamin therapeutic (THERAGRAN) tablet  nitroGLYcerin (NITROSTAT) 0.4 MG sublingual tablet  oxyBUTYnin ER (DITROPAN XL) 15 MG 24 hr tablet  simvastatin (ZOCOR) 20 MG tablet  tamsulosin (FLOMAX) 0.4 MG capsule        ALLERGIES:  Allergies   Allergen Reactions    Shellfish Containing Products [Shellfish-Derived Products] Unknown     Shrimp    Shrimp Extract      Other Reaction(s): Not available    Ampicillin Rash     On face       FAMILY HISTORY:  Family History   Problem Relation Age of Onset    Prostate Cancer Brother     Other Cancer Mother     Diabetes Paternal Grandmother   "      SOCIAL HISTORY:   Social History     Socioeconomic History    Marital status:    Tobacco Use    Smoking status: Never     Passive exposure: Past    Smokeless tobacco: Never   Substance and Sexual Activity    Alcohol use: No    Drug use: No   Other Topics Concern    Parent/sibling w/ CABG, MI or angioplasty before 65F 55M? No     Social Determinants of Health     Financial Resource Strain: Low Risk  (1/29/2024)    Financial Resource Strain     Within the past 12 months, have you or your family members you live with been unable to get utilities (heat, electricity) when it was really needed?: No   Food Insecurity: Low Risk  (1/29/2024)    Food Insecurity     Within the past 12 months, did you worry that your food would run out before you got money to buy more?: No     Within the past 12 months, did the food you bought just not last and you didn t have money to get more?: No   Transportation Needs: Low Risk  (1/29/2024)    Transportation Needs     Within the past 12 months, has lack of transportation kept you from medical appointments, getting your medicines, non-medical meetings or appointments, work, or from getting things that you need?: No    Received from [facility], [facility]    Social Connections   Housing Stability: Low Risk  (1/29/2024)    Housing Stability     Do you have housing? : Yes     Are you worried about losing your housing?: No       VITALS:  /56   Pulse 95   Temp 97.6  F (36.4  C) (Oral)   Resp 30   Ht 1.676 m (5' 6\")   Wt 76.2 kg (168 lb)   SpO2 94%   BMI 27.12 kg/m      PHYSICAL EXAM    Constitutional: Well developed, Well nourished, NAD,  HENT: Normocephalic, Atraumatic,  mucous membranes moist,   Neck- trachea midline, No stridor.    Eyes:EOMI, Conjunctiva normal, No discharge.   Respiratory: Normal breath sounds, No respiratory distress, No wheezing.    Cardiovascular: Normal heart rate, Regular " rhythm,  No murmurs,   Abdominal: Soft, No tenderness, No rebound or guarding.   No CVA tenderness  Musculoskeletal: No tenderness in midline thoracic or lumbar spine, point tenderness palpation over the paralumbar spinal musculature on the left side as well as the upper gluteal region, pain is exacerbated with straight leg raise  Integument: Warm, Dry, No erythema,    Neurologic: Alert & oriented x 3, symmetric strength with plantar dorsiflexion bilaterally, intact sensation in dorsal and plantar aspects of the feet and no saddle anesthesia  Psychiatric: Affect normal, Cooperative.      LAB:  All pertinent labs reviewed and interpreted.  Results for orders placed or performed during the hospital encounter of 06/22/24   CT Abdomen Pelvis w Contrast    Impression    IMPRESSION:   1.  No acute abnormality is identified. Constellation chronic findings as discussed above.   CT Lumbar Spine Reconstructed    Impression    IMPRESSION:  1.  No acute lumbar spine fracture.   Basic metabolic panel   Result Value Ref Range    Sodium 140 135 - 145 mmol/L    Potassium 4.4 3.4 - 5.3 mmol/L    Chloride 105 98 - 107 mmol/L    Carbon Dioxide (CO2) 26 22 - 29 mmol/L    Anion Gap 9 7 - 15 mmol/L    Urea Nitrogen 23.5 (H) 8.0 - 23.0 mg/dL    Creatinine 0.88 0.67 - 1.17 mg/dL    GFR Estimate 80 >60 mL/min/1.73m2    Calcium 9.0 8.2 - 9.6 mg/dL    Glucose 117 (H) 70 - 99 mg/dL   CBC with platelets and differential   Result Value Ref Range    WBC Count 9.1 4.0 - 11.0 10e3/uL    RBC Count 3.46 (L) 4.40 - 5.90 10e6/uL    Hemoglobin 11.5 (L) 13.3 - 17.7 g/dL    Hematocrit 34.3 (L) 40.0 - 53.0 %    MCV 99 78 - 100 fL    MCH 33.2 (H) 26.5 - 33.0 pg    MCHC 33.5 31.5 - 36.5 g/dL    RDW 13.7 10.0 - 15.0 %    Platelet Count 278 150 - 450 10e3/uL    % Neutrophils 41 %    % Lymphocytes 39 %    % Monocytes 16 %    % Eosinophils 3 %    % Basophils 1 %    % Immature Granulocytes 1 %    NRBCs per 100 WBC 0 <1 /100    Absolute Neutrophils 3.8 1.6 - 8.3  10e3/uL    Absolute Lymphocytes 3.5 0.8 - 5.3 10e3/uL    Absolute Monocytes 1.5 (H) 0.0 - 1.3 10e3/uL    Absolute Eosinophils 0.2 0.0 - 0.7 10e3/uL    Absolute Basophils 0.1 0.0 - 0.2 10e3/uL    Absolute Immature Granulocytes 0.1 <=0.4 10e3/uL    Absolute NRBCs 0.0 10e3/uL       RADIOLOGY:  Reviewed all pertinent imaging. Please see official radiology report.  CT Lumbar Spine Reconstructed   Final Result   IMPRESSION:   1.  No acute lumbar spine fracture.      CT Abdomen Pelvis w Contrast   Final Result   IMPRESSION:    1.  No acute abnormality is identified. Constellation chronic findings as discussed above.          EKG:        PROCEDURES:         [employer] Documentation:   CMS Diagnoses:                Sami Marc MD  [facility] EMERGENCY DEPARTMENT  [facility] [facility] [id number]  [phone]      Sami Marc MD  06/22/24 0605

## 2024-06-22 NOTE — DISCHARGE INSTRUCTIONS
Suspect the cause of your pain is a condition called lumbar radiculopathy.  This can happen when you have muscle spasms in the lower back or irritation to the nerves as it leaves her spine to go down your leg.  Fortunately pain like this is usually self-limiting but can cause discomfort for some time several days and sometimes several weeks.  The best medication she can take is Tylenol.  He also been given a short prescription for medication called cyclobenzaprine (Flexeril).  This can help with back pain but sometimes makes people feel a bit drowsy so be cautious when you are taking the medication to be sure you are not taking the medication before driving a vehicle.  Otherwise would recommend following up with your primary care doctor and if your symptoms persist you may benefit from evaluation by physical therapist.

## 2024-06-22 NOTE — ED TRIAGE NOTES
Patient arrives from home via [facility].  Patient reports laying down in bed about 2 hours ago, when he experienced pain in the left lower back.  He is usually independent with a cane, but was not able to get out of bed without his wife's assistance.  Patient denies injury or fall.  The only thing different about today was he sat on a walker seat for about 2 hours.  Pain is tolerable when laying still, but becomes intense with movement.  Patient states he cannot ambulate.  Easy respirations.  Skin of normal color, warm, and dry.      Triage Assessment (Adult)       Row Name 06/22/24 0013          Triage Assessment    Airway WDL WDL        Respiratory WDL    Respiratory WDL WDL        Cardiac WDL    Cardiac WDL WDL        Peripheral/Neurovascular WDL    Peripheral Neurovascular WDL WDL

## 2024-07-08 ENCOUNTER — TRANSFERRED RECORDS (OUTPATIENT)
Dept: HEALTH INFORMATION MANAGEMENT | Facility: CLINIC | Age: 89
End: 2024-07-08
Payer: COMMERCIAL

## 2024-07-25 ENCOUNTER — OFFICE VISIT (OUTPATIENT)
Dept: PALLIATIVE MEDICINE | Facility: OTHER | Age: 89
End: 2024-07-25
Attending: STUDENT IN AN ORGANIZED HEALTH CARE EDUCATION/TRAINING PROGRAM
Payer: COMMERCIAL

## 2024-07-25 VITALS — OXYGEN SATURATION: 96 % | HEART RATE: 89 BPM | SYSTOLIC BLOOD PRESSURE: 113 MMHG | DIASTOLIC BLOOD PRESSURE: 59 MMHG

## 2024-07-25 DIAGNOSIS — M67.912 TENDINOPATHY OF LEFT ROTATOR CUFF: ICD-10-CM

## 2024-07-25 DIAGNOSIS — M54.16 LUMBAR RADICULOPATHY: ICD-10-CM

## 2024-07-25 DIAGNOSIS — M54.12 CERVICAL RADICULOPATHY: Primary | ICD-10-CM

## 2024-07-25 PROCEDURE — 99214 OFFICE O/P EST MOD 30 MIN: CPT | Performed by: STUDENT IN AN ORGANIZED HEALTH CARE EDUCATION/TRAINING PROGRAM

## 2024-07-25 PROCEDURE — G0463 HOSPITAL OUTPT CLINIC VISIT: HCPCS | Performed by: STUDENT IN AN ORGANIZED HEALTH CARE EDUCATION/TRAINING PROGRAM

## 2024-07-25 ASSESSMENT — PAIN SCALES - GENERAL: PAINLEVEL: MILD PAIN (2)

## 2024-07-25 NOTE — PROGRESS NOTES
Patient presents to the clinic today for a visit with Abilio Tran MD regarding Pain Management.          1/11/2024     2:15 PM 4/18/2024     3:27 PM 7/25/2024     3:01 PM   PEG Score   PEG Total Score 4.33 0.67 0.67         Notes    Hortensia ZHAO [facility] Clinical Assistant

## 2024-07-25 NOTE — PATIENT INSTRUCTIONS
Procedures:   Can repeat C-ILESI no sooner than 3 months from prior  Can repeat left Subacromial bursa injection no sooner than 3 months from prior  Can repeat left lumbar GERARD in future, no sooner than 3 months from prior  Left suprascapular nerve block and/or PNS can be considered for the future  Physical Therapy: continue HEP. Avoid compensating with left trapezium to move left shoulder.  Diagnostic Studies:   Last MRI Cervical spine 5/17/23  Medication Management:   Continue tylenol  Discontinue methocarbamol  No Flexeril, tizanidine, or baclofen due to known CNS depression/sedation effects, which could result in falls.  Use advil sparingly  Follow up: 3 months    Abilio Tran MD  Interventional Pain Medicine  [facility]

## 2024-07-25 NOTE — PROGRESS NOTES
[facility] Interventional Follow-up    Date of visit: 7/25/2024    Chief complaint:   Chief Complaint   Patient presents with    Pain       Interval history:  Since his last visit, Balwinder Huang reports:  Left subacromial injection was helpful and continuing to help.    Patient did have an acute episode of left low back pain and flank pain, treated at the emergency room and then at [facility] with a nerve root injection which was significantly helpful.  Patient prefers to keep care unified in our system.    Pain scores:  Pain intensity currently is 2 on a scale of 0-10.     Current pain treatments:   Aleve - helpful     Other relevant meds:  Plavix     Previous medication treatments included:  Methocarbamol 500mg QID PRN - somewhat helpful for the shoulder  Tylenol - helpful  Advil - helping        Other treatments have included:  Balwinder Huang has not been seen at a pain clinic in the past.  For right hip  PT: undergoing - doing HEP. Helpful  Injections:    7/23/24 - left SNRB with steroid at [facility] - helped   3/6/24 - left subacromial bursa injection - significantly helpful 75+% relief              7/6//23 - C-ILESI - 70% relief, ongoing              3/29/23 TPI left neck, thoracic paraspinals, rhomboids, levator scapula - helped for 1 day  Surgery: none  Alternative: chiropractor - has been going, helped a little       Side Effects: no side effect    Medications:  Current Outpatient Medications   Medication Sig Dispense Refill    acetaminophen (TYLENOL) 500 MG tablet Take 2 tablets (1,000 mg) by mouth 3 times daily 60 tablet 0    clopidogrel (PLAVIX) 75 MG tablet Take 1 tablet (75 mg) by mouth daily 90 tablet 1    multivitamin therapeutic (THERAGRAN) tablet [MULTIVITAMIN THERAPEUTIC (THERAGRAN) TABLET] Take 1 tablet by mouth daily.      nitroGLYcerin (NITROSTAT) 0.4 MG sublingual tablet nitroglycerin 0.4 mg sublingual tablet  PLACE ONE TABLET  UNDER TONGUE AS NEEDED FOR CHEST PAIN EVERY 5 MINUTES AS DIRECTED 10 tablet 0    oxyBUTYnin ER (DITROPAN XL) 15 MG 24 hr tablet Take 1 tablet (15 mg) by mouth daily 90 tablet 3    simvastatin (ZOCOR) 20 MG tablet Take 1 tablet (20 mg) by mouth at bedtime 90 tablet 3    tamsulosin (FLOMAX) 0.4 MG capsule TAKE 2 CAPSULES(0.8 MG) BY MOUTH DAILY AFTER BREAKFAST 180 capsule 3       Medical History: any changes in medical history since they were last seen? No    Physical Exam:  Blood pressure 113/59, pulse 89, SpO2 96%.  Constitutional: Well developed, NAD  Head: normocephalic. Atraumatic.   Eyes: no redness or jaundice noted   CV: warm, well perfused extremities   Skin: no suspicious lesions or rashes   Psychiatric: mentation appears normal and affect full    Diagnostics:  EXAM: CT LUMBAR SPINE RECONSTRUCTED  LOCATION: [facility]  DATE: 6/22/2024     INDICATION: Sudden onset lower back pain, eval for fracture.  COMPARISON: CT abdomen pelvis 10/4/2022  TECHNIQUE: Routine CT Lumbar Spine without IV contrast. Multiplanar reformats. Dose reduction techniques were used.      FINDINGS:  VERTEBRA: 5 lumbar-type vertebral bodies. Mild left convexity lumbar curvature. No acute fracture. Marked loss of disc space height L2-L3 and moderate to marked changes L3-L4 through L5-S1. Moderate-to-marked lower lumbar facet arthropathy.     CANAL/FORAMINA: No high-grade canal narrowing. Moderate to marked left L5-S1 and right L4-L5 degenerative foraminal narrowing.     PARASPINAL: Please refer to CT abdomen pelvis same day for extraspinal findings.                                                                      IMPRESSION:  1.  No acute lumbar spine fracture.    Personally reviewed: yes    Assessment:   Cervical spondylosis  Myofascial pain  Left rotator cuff tendinopathy  Lumbar radiculopathy     Balwinder Huang is a 89 year old male who presents for follow up of his left-sided neck pain and shoulder pain.  We  have been treating both cervical radiculopathy and left rotator cuff tendinopathy.  Cervical GERARD's have been significantly helpful, and the most recent subacromial bursa injection on the left was also quite helpful, in conjunction with physical therapy.  Patient notes functional improvements, and the wife has noted less reporting of pain while performing activities.  Patient is pleased with interventional management and would like to continue.  We repeated both the cervical epidural steroid injection as well as the left subacromial bursa injection with great relief of pain, now tolerating well exercises for neck and shoulder.  He is continuing to do well with the left shoulder, doing his exercises regularly.  He did have an episode of acute left low back pain and saw some orthopedics, where a left L5 and S1 nerve root injection was completed.  This was significantly helpful to the patient.  He would like to consolidate care in our system going forward.  I discussed that we would want to space out these injections to reduce the total nearly dosing of steroid.  Will hold off on any other injections at this stage given ongoing efficacy in the neck and left shoulder as well as low back, we will see him back in 3 months to review.    Plan:   Diagnosis reviewed, treatment option addressed, and risk/benefits discussed.      Procedures:   Can repeat C-ILESI no sooner than 3 months from prior  Can repeat left Subacromial bursa injection no sooner than 3 months from prior  Can repeat left lumbar GERARD in future, no sooner than 3 months from prior  Left suprascapular nerve block and/or PNS can be considered for the future  Physical Therapy: continue HEP. Avoid compensating with left trapezium to move left shoulder.  Diagnostic Studies:   Last MRI Cervical spine 5/17/23  CT Lumbar spine reviewed  Medication Management:   Continue tylenol  Discontinue methocarbamol  No Flexeril, tizanidine, or baclofen due to known CNS  depression/sedation effects, which could result in falls.  Use advil sparingly  Follow up: 3 months    Abilio Tran MD  Interventional Pain Medicine  [facility]

## 2024-07-29 DIAGNOSIS — M79.18 MYOFASCIAL PAIN: ICD-10-CM

## 2024-07-29 DIAGNOSIS — M54.12 CERVICAL RADICULOPATHY: ICD-10-CM

## 2024-07-29 RX ORDER — METHOCARBAMOL 500 MG/1
500 TABLET, FILM COATED ORAL 4 TIMES DAILY PRN
Qty: 120 TABLET | Refills: 3 | Status: CANCELLED | OUTPATIENT
Start: 2024-07-29

## 2024-07-29 NOTE — TELEPHONE ENCOUNTER
Received fax from pharmacy requesting refill(s) for     Methocarbamol 500 MG     Date last filled 05/16/2023    Last Appt Date:07/25/2024    Next Appt scheduled: 10/24/2024    Pharmacy:   [id number] - [place] - [address] AT [facility] [address],    [facility] route for processing    Ira Golden MA  [facility] [place]

## 2024-08-12 ENCOUNTER — TRANSFERRED RECORDS (OUTPATIENT)
Dept: HEALTH INFORMATION MANAGEMENT | Facility: CLINIC | Age: 89
End: 2024-08-12
Payer: COMMERCIAL

## 2024-09-24 ENCOUNTER — TRANSFERRED RECORDS (OUTPATIENT)
Dept: HEALTH INFORMATION MANAGEMENT | Facility: CLINIC | Age: 89
End: 2024-09-24
Payer: COMMERCIAL

## 2024-10-08 NOTE — TELEPHONE ENCOUNTER
----- Message from Miguel Day MD sent at 10/7/2019  3:36 PM CDT -----  Please inform patient that cholesterol levels are at goal range.  Kidney and liver function are normal with no signs of diabetes.  No concerns on blood work.   done

## 2024-10-24 ENCOUNTER — OFFICE VISIT (OUTPATIENT)
Dept: PALLIATIVE MEDICINE | Facility: OTHER | Age: 89
End: 2024-10-24
Attending: STUDENT IN AN ORGANIZED HEALTH CARE EDUCATION/TRAINING PROGRAM
Payer: COMMERCIAL

## 2024-10-24 ENCOUNTER — TELEPHONE (OUTPATIENT)
Dept: PALLIATIVE MEDICINE | Facility: OTHER | Age: 89
End: 2024-10-24

## 2024-10-24 VITALS — SYSTOLIC BLOOD PRESSURE: 114 MMHG | DIASTOLIC BLOOD PRESSURE: 54 MMHG | OXYGEN SATURATION: 98 % | HEART RATE: 88 BPM

## 2024-10-24 DIAGNOSIS — M54.16 LUMBAR RADICULOPATHY: ICD-10-CM

## 2024-10-24 DIAGNOSIS — M67.912 TENDINOPATHY OF LEFT ROTATOR CUFF: Primary | ICD-10-CM

## 2024-10-24 PROCEDURE — 99214 OFFICE O/P EST MOD 30 MIN: CPT | Performed by: STUDENT IN AN ORGANIZED HEALTH CARE EDUCATION/TRAINING PROGRAM

## 2024-10-24 PROCEDURE — G0463 HOSPITAL OUTPT CLINIC VISIT: HCPCS | Performed by: STUDENT IN AN ORGANIZED HEALTH CARE EDUCATION/TRAINING PROGRAM

## 2024-10-24 RX ORDER — METHOCARBAMOL 500 MG/1
500 TABLET, FILM COATED ORAL 4 TIMES DAILY PRN
Qty: 120 TABLET | Refills: 3 | Status: SHIPPED | OUTPATIENT
Start: 2024-10-24

## 2024-10-24 ASSESSMENT — PAIN SCALES - GENERAL: PAINLEVEL_OUTOF10: MILD PAIN (2)

## 2024-10-24 NOTE — PROGRESS NOTES
[facility] Interventional Follow-up    Date of visit: 10/24/2024    Chief complaint:   Chief Complaint   Patient presents with    Pain       Interval history:  Since his last visit, Balwinder Huang reports:  Continued significant management of the current pain syndromes.  He has 1-2 spasms a month where the pain flares to severe, last 30 minutes, resolves spontaneously.  He notes that methocarbamol was helping for this and would like a new order of this.    Pain scores:  Pain intensity currently is 2 on a scale of 0-10.     Current pain treatments:   Aleve - helpful     Other relevant meds:  Plavix     Previous medication treatments included:  Methocarbamol 500mg QID PRN - somewhat helpful for the shoulder  Tylenol - helpful  Advil - helping        Other treatments have included:  Balwinder Huang has not been seen at a pain clinic in the past.  For right hip  PT: undergoing - doing HEP. Helpful  Injections:               7/23/24 - left L5 and S1 SNRB with steroid at [facility] - helped              3/6/24 - left subacromial bursa injection - significantly helpful 75+% relief              7/6//23 - C-ILESI - 70% relief, ongoing              3/29/23 TPI left neck, thoracic paraspinals, rhomboids, levator scapula - helped for 1 day  Surgery: none  Alternative: chiropractor - has been going, helped a little    Side Effects: no side effect    Medications:  Current Outpatient Medications   Medication Sig Dispense Refill    acetaminophen (TYLENOL) 500 MG tablet Take 2 tablets (1,000 mg) by mouth 3 times daily 60 tablet 0    clopidogrel (PLAVIX) 75 MG tablet Take 1 tablet (75 mg) by mouth daily 90 tablet 1    multivitamin therapeutic (THERAGRAN) tablet [MULTIVITAMIN THERAPEUTIC (THERAGRAN) TABLET] Take 1 tablet by mouth daily.      nitroGLYcerin (NITROSTAT) 0.4 MG sublingual tablet nitroglycerin 0.4 mg sublingual tablet  PLACE ONE TABLET UNDER TONGUE AS NEEDED FOR CHEST  PAIN EVERY 5 MINUTES AS DIRECTED 10 tablet 0    oxyBUTYnin ER (DITROPAN XL) 15 MG 24 hr tablet Take 1 tablet (15 mg) by mouth daily 90 tablet 3    simvastatin (ZOCOR) 20 MG tablet Take 1 tablet (20 mg) by mouth at bedtime 90 tablet 3    tamsulosin (FLOMAX) 0.4 MG capsule TAKE 2 CAPSULES(0.8 MG) BY MOUTH DAILY AFTER BREAKFAST 180 capsule 3       Medical History: any changes in medical history since they were last seen? No    Physical Exam:  Blood pressure 114/54, pulse 88, SpO2 98%.  Constitutional: Well developed, NAD  Head: normocephalic. Atraumatic.   Eyes: no redness or jaundice noted   CV: warm, well perfused extremities   Skin: no suspicious lesions or rashes   Psychiatric: mentation appears normal and affect full    Diagnostics:  none    Personally reviewed: N/A    Assessment:   Cervical spondylosis  Myofascial pain  Left rotator cuff tendinopathy  Lumbar radiculopathy     Balwinder Huang is a 89 year old male who presents for follow up of his left-sided neck pain and shoulder pain.  We have been treating both cervical radiculopathy and left rotator cuff tendinopathy.  Cervical GERARD's have been significantly helpful, and the most recent subacromial bursa injection on the left was also quite helpful, in conjunction with physical therapy.  Patient notes functional improvements, and the wife has noted less reporting of pain while performing activities.  Patient is pleased with interventional management and would like to continue.  We repeated both the cervical epidural steroid injection as well as the left subacromial bursa injection with great relief of pain, now tolerating well exercises for neck and shoulder.  He still does relatively well with the left shoulder, but notes it would be good to repeat the left subacromial injection, which I ordered.  He also had a left L5 and S1 two-level transforaminal epidural steroid injection at [facility], and would like to consolidate care here, so we will repeat this  injection at least 1 month after the subacromial injection.  We also discussed that the patient has left arm numbness, likely from the cervical radiculopathy.  In general, cervical ILESI does not treat numbness, but the fluid effect may clear out inflammatory markers or hydrodissect the nerve off stenosed neuroforamen which could provide some relief.  Given that we are doing 2 other steroid injections, I advised to hold off on this for now.  A new order for methocarbamol was placed as well.  Patient agrees.  Will see him back a month after the TFESI     Plan:   Diagnosis reviewed, treatment option addressed, and risk/benefits discussed.      Procedures:   Left subacromial bursa injection ordered  Left L5 and S1 TFESI ordered at least 1 month later  Can repeat C-ILESI no sooner than 3 months from prior  Left suprascapular nerve block and/or PNS can be considered for the future  Physical Therapy: continue HEP. Avoid compensating with left trapezium to move left shoulder.  Diagnostic Studies:   Last MRI Cervical spine 5/17/23  CT Lumbar spine reviewed  Medication Management:   Continue tylenol  Restart PRN methocarbamol  No Flexeril, tizanidine, or baclofen due to known CNS depression/sedation effects, which could result in falls.  Use advil sparingly  Follow up: for procedures then 1 month after TFESI    Abilio Tran MD  Interventional Pain Medicine  [facility]

## 2024-10-24 NOTE — PATIENT INSTRUCTIONS
Procedures:   Left subacromial bursa injection ordered  Left L5 and S1 TFESI ordered at least 1 month later  Can repeat C-ILESI no sooner than 3 months from prior  Left suprascapular nerve block and/or PNS can be considered for the future  Physical Therapy: continue HEP. Avoid compensating with left trapezium to move left shoulder.  Diagnostic Studies:   Last MRI Cervical spine 5/17/23  CT Lumbar spine reviewed  Medication Management:   Continue tylenol  Restart PRN methocarbamol  No Flexeril, tizanidine, or baclofen due to known CNS depression/sedation effects, which could result in falls.  Use advil sparingly  Follow up: for procedures then 1 month after TFESI    Abilio Tran MD  Interventional Pain Medicine  [facility]

## 2024-10-24 NOTE — TELEPHONE ENCOUNTER
"Screening Questions for Radiology Injections:   Left L5 and S1 two level TFESI       Injection to be done at which interventional clinic site? [facility]    If choosing [facility] for location, please inform patient:  \"[facility] is a Hospital based clinic. Before your visit, you should check with your insurance about how it covers the charges for facility services in a hospital-based clinic.     Procedure ordered by DR FOY    Procedure ordered?   Left L5 and S1 two level TFESI     Transforaminal Cervical GERARD - Send to [facility] - No [facility] providers perform this procedure    What insurance would patient like us to bill for this procedure?  UCARE/UCARE MEDICARE   IF SCHEDULING IN [place] PAIN OR SPINE PLEASE SCHEDULE AT LEAST 7-10 BUSINESS DAYS OUT SO A PA CAN BE OBTAINED  Worker's comp or MVA (motor vehicle accident) -Any injection DO NOT SCHEDULE and route to Rosalee De La Torre.    [employer] insurance - For ALL INJECTIONS DO NOT SCHEDULE and route to Eloisa Panchal.     ALL BCBS, [employer] and [employer] - DO NOT SCHEDULE and route to Eloisa Panchal  MEDICA- ALL INJECTIONS- route to Eloisa Abdulkadir    Is patient scheduled at [facility]? N/A   If YES, route every encounter to [facility] SPINE CENTER CARE NAVIGATION POOL [[phone]]    Is an  needed? No     Patient has a  home? (Review Grid) YES: wife     Any chance of pregnancy? Not Applicable   If YES, do NOT schedule and route to RN pool  - Dr. Thornton route to PM&R Nurse  [[id number]]      Is patient actively being treated for cancer or immunocompromised? No  If YES, do NOT schedule and route to RN pool/ Dr. Thornton's Team    Does the patient have a bleeding or clotting disorder? No   If YES, okay to schedule AND route to RN nurse / Dr. Thornton's Team   (For any patients with platelet count <100, RN must forward to provider)    Is patient taking any Blood Thinners OR Antiplatelet medication?  Yes - (PLAVIX)    If hold " needed, do NOT schedule, route to RN pool/ Dr. Thornton's Team  Examples:   Blood Thinners: (Coumadin, Warfarin, Jantoven, Pradaxa, Xarelto, Eliquis, Edoxaban, Enoxaparin, Lovenox, Heparin, Arixtra, Fondaparinux or Fragmin)  Antiplatelet Medications: (Plavix, Brilinta or Effient)     Is patient taking any aspirin products (includes Excedrin and Fiorinal)? No   If yes route to RN pool/ Dr. Thornton's Team - Do not schedule    Is patient taking any GLP-1 Antagonist (hold needed for sedation patients only) No   (semaglutide (Ozempic, Wegovy), dulaglutide (Trulicity), exenatide ER (Bydureon), tirzepatide (Mounjaro), Liraglutide (Saxenda, Victoza), semaglutide (Rybelsus), Terzepatide (Zepbound)  If YES, okay to schedule AND route to RN nurse / Dr. Thornton's Team      Any allergies to contrast dye, iodine, shellfish, or numbing and steroid medications? No  If YES, schedule and add allergy information to appointment notes AND route to the RN pool/ Dr. Thornton's Team  If GERARD and Contrast Dye / Iodine Allergy? DO NOT SCHEDULE, route to RN pool/ Dr. Thornton's Team  Allergies: Shellfish containing products [shellfish-derived products], Shrimp extract, and Ampicillin     Does patient have an active infection or treated for one within the past week? No  informed   Is patient currently taking any antibiotics or steroid medications?  No  informed   For patients on chronic, preventative, or prophylactic antibiotics, procedures may be scheduled.   For patients on antibiotics for active or recent infection, schedule 4 days after completed.  For patients on steroid medications, schedule 4 days after completed.     Has the patient had a flu shot or any other vaccinations within the past 7 days? No  If yes, explain that for the vaccine to work best they need to:     wait 1 week before and 1 week after getting any Vaccine  wait 1 week before and 2 weeks after getting any Covid Vaccine   If patient has concerns about the timing, send to RN pool/  Dr. Thornton's Team    Does patient have an MRI/CT?  CT SCAN  Include Date and Check Procedure Scheduling Grid to see if required.  Was the MRI/CT done within the last 3 years?  NA   If no route to RN Pool/ Dr. Toribio Team  If yes, where was the MRI/CT done? [facility]     Refer to PACS Transmissions list for approved external locations and route to RN Pool High Priority/ Dr. Toribio Team  If MRI was not done at approved external location do NOT schedule and route to RN pool/ Dr. Toribio Team    If patient has an imaging disc, the injection MAY be scheduled but patient must bring disc to appt or appt will be cancelled.    Is patient able to transfer to a procedure table with minimal or no assistance? Yes   If no, do NOT schedule and route to RN Pool/ Dr. Toribio Team    Procedure Specific Instructions:  If celiac plexus block, informed patient NPO for 6 hours and that it is okay to take medications with sips of water, especially blood pressure medications NO        If this is for a cervical procedure, informed patient that aspirin needs to be held for 6 days.   NO    Sedation, If Sedation is ordered for any procedure, patient must be NPO for 6 hours prior to procedure NO    If IV needed:  Do not schedule procedures requiring IV placement in the first appointment of the day or first appointment after lunch. Do NOT schedule at 0745, 0815 or 1245. NO   Instructed patient to arrive 30 minutes early for IV start if required. (Check Procedure Scheduling Grid)  NO    Reminders:  If you are started on any steroids or antibiotics between now and your appointment, you must contact us because the procedure may need to be cancelled.  No   INFORMED     As a reminder, receiving steroids can decrease your body's ability to fight infection.   Would you still like to move forward with scheduling the injection?  Yes    IV Sedation is not provided for procedures. If oral anti-anxiety medication is needed, the patient should request this  from their referring provider.    Instruct patient to arrive as directed prior to the scheduled appointment time:  If IV needed 30 minutes before appointment time     For patients 85 or older we recommend having an adult stay w/ them for the remainder of the day.     If the patient is Diabetic, remind them to bring their glucometer.      Does the patient have any questions?  NO  Hortensia Olvera  [facility]

## 2024-10-24 NOTE — PROGRESS NOTES
Patient presents to the clinic today for a visit with Abilio Tran MD regarding Pain Management.          4/18/2024     3:27 PM 7/25/2024     3:01 PM 10/24/2024     1:51 PM   PEG Score   PEG Total Score 0.67 0.67 2       UDS/CSA-     Medications- na    Notes pain is a one or two, occasionally a four or five. The problem now is his left arm going to sleep. And pain once in a while. Bearable and relieved with Tylenol patient reports    Hortensia Watkins  [facility] Clinical Assistant

## 2024-11-14 ENCOUNTER — TRANSFERRED RECORDS (OUTPATIENT)
Dept: HEALTH INFORMATION MANAGEMENT | Facility: CLINIC | Age: 89
End: 2024-11-14
Payer: COMMERCIAL

## 2024-11-14 DIAGNOSIS — I25.10 CARDIOVASCULAR DISEASE: ICD-10-CM

## 2024-11-14 RX ORDER — CLOPIDOGREL BISULFATE 75 MG/1
75 TABLET ORAL DAILY
Qty: 90 TABLET | Refills: 0 | Status: SHIPPED | OUTPATIENT
Start: 2024-11-14 | End: 2025-02-10

## 2024-12-04 ENCOUNTER — RADIOLOGY INJECTION OFFICE VISIT (OUTPATIENT)
Dept: PALLIATIVE MEDICINE | Facility: OTHER | Age: 89
End: 2024-12-04
Attending: STUDENT IN AN ORGANIZED HEALTH CARE EDUCATION/TRAINING PROGRAM
Payer: COMMERCIAL

## 2024-12-04 VITALS — OXYGEN SATURATION: 96 % | HEART RATE: 95 BPM | DIASTOLIC BLOOD PRESSURE: 59 MMHG | SYSTOLIC BLOOD PRESSURE: 111 MMHG

## 2024-12-04 DIAGNOSIS — M54.16 LUMBAR RADICULOPATHY: ICD-10-CM

## 2024-12-04 PROCEDURE — 64484 NJX AA&/STRD TFRM EPI L/S EA: CPT | Mod: LT | Performed by: STUDENT IN AN ORGANIZED HEALTH CARE EDUCATION/TRAINING PROGRAM

## 2024-12-04 PROCEDURE — 250N000011 HC RX IP 250 OP 636: Performed by: STUDENT IN AN ORGANIZED HEALTH CARE EDUCATION/TRAINING PROGRAM

## 2024-12-04 PROCEDURE — 255N000002 HC RX 255 OP 636: Performed by: STUDENT IN AN ORGANIZED HEALTH CARE EDUCATION/TRAINING PROGRAM

## 2024-12-04 PROCEDURE — 250N000009 HC RX 250: Performed by: STUDENT IN AN ORGANIZED HEALTH CARE EDUCATION/TRAINING PROGRAM

## 2024-12-04 RX ORDER — DEXAMETHASONE SODIUM PHOSPHATE 10 MG/ML
10 INJECTION INTRAMUSCULAR; INTRAVENOUS ONCE
Status: COMPLETED | OUTPATIENT
Start: 2024-12-04 | End: 2024-12-04

## 2024-12-04 RX ORDER — LIDOCAINE HYDROCHLORIDE 10 MG/ML
3 INJECTION, SOLUTION EPIDURAL; INFILTRATION; INTRACAUDAL; PERINEURAL ONCE
Status: COMPLETED | OUTPATIENT
Start: 2024-12-04 | End: 2024-12-04

## 2024-12-04 RX ADMIN — IOHEXOL 3 ML: 180 INJECTION INTRAVENOUS at 11:20

## 2024-12-04 RX ADMIN — LIDOCAINE HYDROCHLORIDE 3 ML: 10 INJECTION, SOLUTION EPIDURAL; INFILTRATION; INTRACAUDAL; PERINEURAL at 12:09

## 2024-12-04 RX ADMIN — DEXAMETHASONE SODIUM PHOSPHATE 10 MG: 10 INJECTION, SOLUTION INTRAMUSCULAR; INTRAVENOUS at 12:10

## 2024-12-04 ASSESSMENT — PAIN SCALES - GENERAL
PAINLEVEL_OUTOF10: MODERATE PAIN (4)
PAINLEVEL_OUTOF10: MODERATE PAIN (4)

## 2024-12-04 NOTE — PROGRESS NOTES
Pre-procedure Intake    If YES to any questions or NO to having a     Notify nurses and provider    Are you taking any prescribed blood thinners such as Coumadin, Warfarin, Jantoven, Pradaxa Xarelto, Eliquis, Edoxaban, Enoxaparin, Lovenox, Heparin, Arixtra, Fondaparinux, or Fragmin? OR Antiplatelet medication such as Plavix, Brilinta, or Effient?   Yes -   Other blood thinners   If yes, when did you take your last dose?     Do you take aspirin or use aspirin products?  No  If cervical procedure or interlaminar, have you held aspirin for 6 days?   NA    Do you have any allergies to contrast dye, iodine, steroid and/or numbing medications?  NO    Are you currently taking antibiotics or have an active infection?  NO    Have you had a fever/elevated temperature within the past week? NO    Have you had a vaccination of any kind in the last seven days or a Covid vaccine in the last two weeks NO    Do you have a ? Yes    Are you pregnant or breastfeeding?  Not Applicable      Notify provider and RNs if systolic BP >170, diastolic BP >100, P >100 or O2 sats < 90%

## 2024-12-04 NOTE — PATIENT INSTRUCTIONS
[facility]   Procedure Discharge Instructions    Today you saw:    Dr. Tran    You had an:  Left L5, S1 Epidural steroid injection    Medications used:  Lidocaine Dexamethasone   Omnipaque       If you were holding your blood thinning medication, please restart taking it: N/A  Be cautious when walking. Numbness and/or weakness in the lower extremities may occur for up to 6-8 hours after the procedure due to effect of the local anesthetic  Do not drive for 6 hours. The effect of the local anesthetic could slow your reflexes.   You may resume your regular activities after 24 hours  Avoid strenuous activity for the first 24 hours  You may shower, however avoid swimming, tub baths or hot tubs for 24 hours following your procedure  You may have a mild to moderate increase in pain for several days following the injection.  It may take up to 14 days for the steroid medication to start working although you may feel the effect as early as a few days after the procedure.     You may use ice packs for 10-15 minutes, 3 to 4 times a day at the injection site for comfort  Do not use heat to painful areas for 6 to 8 hours. This will give the local anesthetic time to wear off and prevent you from accidentally burning your skin.   Unless you have been directed to avoid the use of anti-inflammatory medications (NSAIDS), you may use medications such as ibuprofen, Aleve or Tylenol for pain control if needed.   If you were fasting, you may resume your normal diet and medications after the procedure  If you have diabetes, check your blood sugar more frequently than usual as your blood sugar may be higher than normal for 10-14 days following a steroid injection. Contact your doctor who manages your diabetes if your blood sugar is higher than usual  Possible side effects of steroids that you may experience include flushing, elevated blood pressure, increased appetite, mild headaches and  restlessness.  All of these symptoms will get better with time.  If you experience any of the following, call the Pain Clinic at [phone]:  -Fever over 100 degree F  -Swelling, bleeding, redness, drainage, warmth at the injection site  -Progressive weakness or numbness in your legs or arms  -Loss of bowel or bladder function  -Unusual headache that is not relieved by Tylenol or other pain reliever  -Unusual new onset of pain that is not improving

## 2024-12-04 NOTE — PROGRESS NOTES
Pre procedure Diagnosis: lumbar radiculopathy   Post procedure Diagnosis: Same  Procedure performed: left L5 and S1 transforaminal epidural steroid injection, CPT code 73427, 44943  Anesthesia: none  Complications: none immediately  Operators: Abilio Tran MD, Bell Osorio DO    Indications:   Balwinder Huang is a 89 year old male was sent for a lumbar transforaminal epidural steroid injection      Options/alternatives, benefits and risks were discussed with the patient including bleeding, infection, tissue trauma, numbness, weakness, paralysis, spinal cord injury, radiation exposure, headache and reaction to medications. Questions were answered to his satisfaction and he agrees to proceed. Voluntary informed consent was obtained and signed.     Vitals were reviewed: Yes  Allergies were reviewed:  Yes   Medications were reviewed:  Yes   Pre-procedure pain score: 4/10    Procedure:  After getting informed consent, patient was brought into the procedure suite and was placed in a prone position on the procedure table.   A Pause for the Cause was performed.  Patient was prepped and draped in sterile fashion.     After identifying the left L5-S1 and S1 neuroforamina, the C-arm was rotated to a left lateral oblique angle.  A total of 4ml of Lidocaine 1% was used to anesthetize the skin and the needle track at a skin entry site coaxial with the fluoroscopy beam, and overriding the superior aspect of the neuroforamen.  One 22 gauge 5 inch spinal needle and one 22 gauge 3.5 inch needle were advanced under intermittent fluoroscopy until they entered the foramen superiorly.    The position was then inspected from anteroposterior and lateral views, and the needles adjusted appropriately.  A total of 3ml of Omnipaque-180 was injected, confirming appropriate position, with spread into the nerve root sheath and the epidural space, with no intravascular uptake. Visualization of active injection under live fluoroscopy or  digital subtraction angiography or live fluoroscopy further confirmed the above appropriate contrast spread.    A total of 3ml of preservative free 1% lidocaine with 10mg of dexamethasone was injected, split evenly among the above two sites.  The needles were flushed with lidocaine and removed.    During the procedure, the patient DID NOT experience paresthesias corresponding to the nerve root near final needle placement.    Hemostasis was achieved, the area was cleaned, and bandaids were placed when appropriate.    The patient tolerated the procedure well, and was taken to the recovery room. Images were saved to PACS.    Post-procedure pain score: 4/10  Follow-up includes:   -f/u with referring provider    Abilio Tran MD  Interventional Pain Medicine  [facility]

## 2024-12-09 ENCOUNTER — APPOINTMENT (OUTPATIENT)
Dept: RADIOLOGY | Facility: HOSPITAL | Age: 89
End: 2024-12-09
Attending: EMERGENCY MEDICINE
Payer: COMMERCIAL

## 2024-12-09 ENCOUNTER — HOSPITAL ENCOUNTER (EMERGENCY)
Facility: HOSPITAL | Age: 89
Discharge: HOME | End: 2024-12-09
Attending: EMERGENCY MEDICINE | Admitting: EMERGENCY MEDICINE
Payer: COMMERCIAL

## 2024-12-09 ENCOUNTER — APPOINTMENT (OUTPATIENT)
Dept: CT IMAGING | Facility: HOSPITAL | Age: 89
End: 2024-12-09
Attending: EMERGENCY MEDICINE
Payer: COMMERCIAL

## 2024-12-09 VITALS
WEIGHT: 150 LBS | OXYGEN SATURATION: 93 % | HEART RATE: 90 BPM | TEMPERATURE: 98 F | HEIGHT: 66 IN | SYSTOLIC BLOOD PRESSURE: 137 MMHG | DIASTOLIC BLOOD PRESSURE: 59 MMHG | BODY MASS INDEX: 24.11 KG/M2 | RESPIRATION RATE: 18 BRPM
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
## 2024-12-09 DIAGNOSIS — R53.1 GENERALIZED WEAKNESS: ICD-10-CM

## 2024-12-09 DIAGNOSIS — S51.811A SKIN TEAR OF RIGHT FOREARM WITHOUT COMPLICATION, INITIAL ENCOUNTER: ICD-10-CM

## 2024-12-09 DIAGNOSIS — Z79.01 CHRONIC ANTICOAGULATION: ICD-10-CM

## 2024-12-09 LAB
ANION GAP SERPL CALCULATED.3IONS-SCNC: 10 MMOL/L (ref 7–15)
BUN SERPL-MCNC: 19.3 MG/DL (ref 8–23)
CALCIUM SERPL-MCNC: 8.9 MG/DL (ref 8.8–10.4)
CHLORIDE SERPL-SCNC: 105 MMOL/L (ref 98–107)
CREAT SERPL-MCNC: 0.77 MG/DL (ref 0.67–1.17)
EGFRCR SERPLBLD CKD-EPI 2021: 83 ML/MIN/1.73M2
ERYTHROCYTE [DISTWIDTH] IN BLOOD BY AUTOMATED COUNT: 13.2 % (ref 10–15)
GLUCOSE SERPL-MCNC: 120 MG/DL (ref 70–99)
HCO3 SERPL-SCNC: 25 MMOL/L (ref 22–29)
HCT VFR BLD AUTO: 33.5 % (ref 40–53)
HGB BLD-MCNC: 11.6 G/DL (ref 13.3–17.7)
INR PPP: 1.02 (ref 0.85–1.15)
MCH RBC QN AUTO: 34 PG (ref 26.5–33)
MCHC RBC AUTO-ENTMCNC: 34.6 G/DL (ref 31.5–36.5)
MCV RBC AUTO: 98 FL (ref 78–100)
PLATELET # BLD AUTO: 203 10E3/UL (ref 150–450)
POTASSIUM SERPL-SCNC: 3.6 MMOL/L (ref 3.4–5.3)
RBC # BLD AUTO: 3.41 10E6/UL (ref 4.4–5.9)
SODIUM SERPL-SCNC: 140 MMOL/L (ref 135–145)
WBC # BLD AUTO: 13.4 10E3/UL (ref 4–11)

## 2024-12-09 PROCEDURE — 72125 CT NECK SPINE W/O DYE: CPT

## 2024-12-09 PROCEDURE — 73030 X-RAY EXAM OF SHOULDER: CPT | Mod: RT

## 2024-12-09 PROCEDURE — 99285 EMERGENCY DEPT VISIT HI MDM: CPT | Mod: 25

## 2024-12-09 PROCEDURE — 85027 COMPLETE CBC AUTOMATED: CPT | Performed by: EMERGENCY MEDICINE

## 2024-12-09 PROCEDURE — 85610 PROTHROMBIN TIME: CPT | Performed by: EMERGENCY MEDICINE

## 2024-12-09 PROCEDURE — 70450 CT HEAD/BRAIN W/O DYE: CPT

## 2024-12-09 PROCEDURE — 80048 BASIC METABOLIC PNL TOTAL CA: CPT | Performed by: EMERGENCY MEDICINE

## 2024-12-09 PROCEDURE — 36415 COLL VENOUS BLD VENIPUNCTURE: CPT | Performed by: EMERGENCY MEDICINE

## 2024-12-09 ASSESSMENT — ACTIVITIES OF DAILY LIVING (ADL)
ADLS_ACUITY_SCORE: 41
ADLS_ACUITY_SCORE: 45

## 2024-12-09 ASSESSMENT — COLUMBIA-SUICIDE SEVERITY RATING SCALE - C-SSRS
6. HAVE YOU EVER DONE ANYTHING, STARTED TO DO ANYTHING, OR PREPARED TO DO ANYTHING TO END YOUR LIFE?: NO
2. HAVE YOU ACTUALLY HAD ANY THOUGHTS OF KILLING YOURSELF IN THE PAST MONTH?: NO
1. IN THE PAST MONTH, HAVE YOU WISHED YOU WERE DEAD OR WISHED YOU COULD GO TO SLEEP AND NOT WAKE UP?: NO

## 2024-12-09 NOTE — DISCHARGE INSTRUCTIONS
You were seen in the emergency department today after a fall.  Your scans today did not show any broken bones.  Your labs look stable.    To help with pain:  - Ice the injury for 20 minutes, 3-5 times per day (or up to every 2 hours as needed) for the first 24-72 hours. You can either use an ice pack or plastic bag filled with ice, cover either one with a damp cloth to prevent the cold from burning your skin.   - You may take 650-1000mg of Acetaminophen (Tylenol).  Please do not use more than 3000 mg in a 24 hour period. Tylenol is an effective drug when taken at the prescribed dosages but can cause bodily injury including liver damage if taken too often or at too high of dose.      Please return to the Emergency Department if you have uncontrolled/worsening pain, difficulty breathing, weakness in one part of your body, falls, inability to keep food/fluids down, or any other new or concerning symptoms. Otherwise please follow up with your primary doctor for recheck.    Included is some information that you might find informative and useful.    Thank you for choosing [facility]. It was a pleasure taking care of you today!  - Dr. Dian Garcia

## 2024-12-09 NOTE — ED NOTES
Pt reporting lower back pain due to an injection. MD aware. Pt reports he will take his home medication for the pain

## 2024-12-09 NOTE — ED NOTES
Road test completed. Pt ambulated slowly with walker, gait belt, and SBA down the howell. He denies dizziness. He reported mild lightheadedness and weakness while ambulating. Pt's wife reports pt has been weak at home and this is his baseline gait. Pt denies SOB after ambulating, but has increased respirations, RR 24. MD aware

## 2024-12-09 NOTE — ED NOTES
Bed: [facility]  Expected date:   Expected time:   Means of arrival:   Comments:  Trauma alert

## 2024-12-09 NOTE — ED TRIAGE NOTES
Pt arrives by EMS from home for unwitnessed fall. Per EMS, pt woke from sleeping and fell. Pt's wife heard the fall and found the pt conscious. Per EMS, pt fell on his right arm and hit his head. He has had dizziness for the past few days. Pt denies LOC. Pt on Eliquis. Per EMS, hx A fib and multiple bypass surgeries.     Triage Assessment (Adult)       Row Name 12/09/24 0448          Triage Assessment    Airway WDL WDL        Respiratory WDL    Respiratory WDL WDL        Skin Circulation/Temperature WDL    Skin Circulation/Temperature WDL X  Large abrasion to right forearm        Cardiac WDL    Cardiac WDL WDL        Peripheral/Neurovascular WDL    Peripheral Neurovascular WDL WDL        Cognitive/Neuro/Behavioral WDL    Cognitive/Neuro/Behavioral WDL WDL        [place] Coma Scale    Best Eye Response 4-->(E4) spontaneous     Best Motor Response 6-->(M6) obeys commands     Best Verbal Response 5-->(V5) oriented     [place] Coma Scale Score 15

## 2025-01-21 ENCOUNTER — TELEPHONE (OUTPATIENT)
Dept: PALLIATIVE MEDICINE | Facility: OTHER | Age: OVER 89
End: 2025-01-21

## 2025-01-21 ENCOUNTER — OFFICE VISIT (OUTPATIENT)
Dept: PALLIATIVE MEDICINE | Facility: OTHER | Age: OVER 89
End: 2025-01-21
Attending: STUDENT IN AN ORGANIZED HEALTH CARE EDUCATION/TRAINING PROGRAM
Payer: COMMERCIAL

## 2025-01-21 VITALS — OXYGEN SATURATION: 95 % | DIASTOLIC BLOOD PRESSURE: 57 MMHG | HEART RATE: 92 BPM | SYSTOLIC BLOOD PRESSURE: 117 MMHG

## 2025-01-21 DIAGNOSIS — M54.16 LUMBAR RADICULOPATHY: Primary | ICD-10-CM

## 2025-01-21 DIAGNOSIS — M67.912 TENDINOPATHY OF LEFT ROTATOR CUFF: ICD-10-CM

## 2025-01-21 PROCEDURE — 20610 DRAIN/INJ JOINT/BURSA W/O US: CPT | Mod: LT | Performed by: STUDENT IN AN ORGANIZED HEALTH CARE EDUCATION/TRAINING PROGRAM

## 2025-01-21 PROCEDURE — 250N000011 HC RX IP 250 OP 636: Performed by: STUDENT IN AN ORGANIZED HEALTH CARE EDUCATION/TRAINING PROGRAM

## 2025-01-21 RX ORDER — ROPIVACAINE HYDROCHLORIDE 5 MG/ML
4 INJECTION, SOLUTION EPIDURAL; INFILTRATION; PERINEURAL ONCE
Status: COMPLETED | OUTPATIENT
Start: 2025-01-21 | End: 2025-01-21

## 2025-01-21 RX ORDER — TRIAMCINOLONE ACETONIDE 40 MG/ML
40 INJECTION, SUSPENSION INTRA-ARTICULAR; INTRAMUSCULAR ONCE
Status: COMPLETED | OUTPATIENT
Start: 2025-01-21 | End: 2025-01-21

## 2025-01-21 RX ADMIN — TRIAMCINOLONE ACETONIDE 40 MG: 40 INJECTION, SUSPENSION INTRA-ARTICULAR; INTRAMUSCULAR at 16:11

## 2025-01-21 RX ADMIN — ROPIVACAINE HYDROCHLORIDE 4 ML: 5 INJECTION EPIDURAL; INFILTRATION; PERINEURAL at 16:11

## 2025-01-21 ASSESSMENT — PAIN SCALES - GENERAL
PAINLEVEL_OUTOF10: MODERATE PAIN (5)
PAINLEVEL_OUTOF10: MODERATE PAIN (4)

## 2025-01-21 NOTE — PROGRESS NOTES
Pt is here for a bursa injection .  Antonia Melvin MA  [facility] [place]

## 2025-01-21 NOTE — PROGRESS NOTES
Pre procedure Diagnosis: chronic left shoulder pain  Post procedure Diagnosis: Same  Procedure performed: left subacromial bursa injection  Anesthesia: none  Complications: none  Operators: Abilio Tran MD    Indications:   Balwinder Huang is a 89 year old male was sent by myself for left subacromial bursa injection.      Options/alternatives, benefits and risks were discussed with the patient including bleeding, infection, tissue trauma, reaction to medications including seizure, nerve injury, weakness, and numbness.  Questions were answered to his  satisfaction and she agrees to proceed. Voluntary informed consent was obtained and signed.     Vitals were reviewed: Yes  Allergies were reviewed:  Yes   Medications were reviewed:  Yes   Pre-procedure pain score: 5/10    Procedure:  After getting informed consent, patient was placed on a chair in an upright position. The acromion was palpated posteriorly and a site immediately inferior was noted or marked for needle entry.    The area was then prepped with chlorhexadine in preparation for the procedure to be performed using clean technique.    A 25 gauge 1.5 inch needle was advanced immediately inferior to the posterior acromion. Upon successful entry, a mixture containing 4ml of 0.5% ropivacaine with 40mg of kenalog was injected into the subacromial space. The needle was removed.     Hemostasis was achieved, the area was cleaned, and bandaids were placed when appropriate.  The patient tolerated the procedure well, and was kept in the recovery room.    Post-procedure pain score: 4/10  Follow-up includes:   -f/u with referring provider    Abilio Tran MD  Interventional Pain Medicine  [facility]

## 2025-01-21 NOTE — TELEPHONE ENCOUNTER
"Screening Questions for Radiology Injections: Left Lumbar Radiculopathy. L5 and S1     Injection to be done at which interventional clinic site? [facility]    If choosing [facility] for location, please inform patient:  \"[facility] is a Hospital based clinic. Before your visit, you should check with your insurance about how it covers the charges for facility services in a hospital-based clinic.     Procedure ordered by  DR FOY     Procedure ordered? Left Lumbar Radiculopathy. L5 and S1   Transforaminal Cervical GERARD - [place] to [facility] - No [facility] Site providers perform this procedure    What insurance would patient like us to bill for this procedure? [employer] MEDICARE   IF SCHEDULING IN [place] PAIN OR SPINE PLEASE SCHEDULE AT LEAST 7-10 BUSINESS DAYS OUT SO A PA CAN BE OBTAINED  Worker's comp or MVA (motor vehicle accident) -Any injection DO NOT SCHEDULE and route to Rosalee De La Torre.    [employer] insurance - For ALL INJECTIONS DO NOT SCHEDULE and route to Eloisa Panchal.     ALL BCBS, [employer] and [employer] - DO NOT SCHEDULE and route to Eloisa Panchal  MEDICA- ALL INJECTIONS- route to Eloisa Panchal    Is patient scheduled at [facility]? NO    If YES, route every encounter to [facility] SPINE CENTER CARE NAVIGATION POOL [[phone]]    Is an  needed? No     Patient has a  home? (Review Grid) YES: WIFE     Any chance of pregnancy? Not Applicable   If YES, do NOT schedule and route to RN pool  - Dr. Thornton route to PM&R Nurse  [[id number]]      Is patient actively being treated for cancer or immunocompromised? No  If YES, do NOT schedule and route to RN pool/ Dr. Thornton's Team    Does the patient have a bleeding or clotting disorder? No   If YES, okay to schedule AND route to RN nurse / Dr. Thornton's Team   (For any patients with platelet count <100, RN must forward to provider)    Is patient taking any Blood Thinners OR Antiplatelet medication?  YES clopidogrel (PLAVIX) 75 MG " tablet   NURSE WILL CALL TO ID 5 DAY HOLD   If hold needed, do NOT schedule, route to RN pool/ Dr. Thornton's Team  Examples:   Blood Thinners: (Coumadin, Warfarin, Jantoven, Pradaxa, Xarelto, Eliquis, Edoxaban, Enoxaparin, Lovenox, Heparin, Arixtra, Fondaparinux or Fragmin)  Antiplatelet Medications: (Plavix, Brilinta or Effient)     Is patient taking any aspirin products (includes Excedrin and Fiorinal)? Yes - Pt takes mg daily; instructed to hold NO  day(s) prior to procedure.    If yes route to RN pool/ Dr. Thornton's Team - Do not schedule    Is patient taking any GLP-1 Antagonist (hold needed for sedation patients only) No   (semaglutide (Ozempic, Wegovy), dulaglutide (Trulicity), exenatide ER (Bydureon), tirzepatide (Mounjaro), Liraglutide (Saxenda, Victoza), semaglutide (Rybelsus), Terzepatide (Zepbound)  If YES, okay to schedule AND route to RN nurse / Dr. Thornton's Team      Any allergies to contrast dye, iodine, shellfish, or numbing and steroid medications? No  If YES, schedule and add allergy information to appointment notes AND route to the RN pool/ Dr. Thornton's Team  If GERARD and Contrast Dye / Iodine Allergy? DO NOT SCHEDULE, route to RN pool/ Dr. Thornton's Team  Allergies: Shellfish containing products [shellfish-derived products], Shrimp extract, and Ampicillin     Does patient have an active infection or treated for one within the past week? No  Is patient currently taking any antibiotics or steroid medications?  No   For patients on chronic, preventative, or prophylactic antibiotics, procedures may be scheduled.   For patients on antibiotics for active or recent infection, schedule 4 days after completed.  For patients on steroid medications, schedule 4 days after completed.     Has the patient had a flu shot or any other vaccinations within the past 7 days? No  If yes, explain that for the vaccine to work best they need to:     wait 1 week before and 1 week after getting any Vaccine  wait 1 week  before and 2 weeks after getting any Covid Vaccine   If patient has concerns about the timing, send to RN pool/ Dr. Thornton's Team    Does patient have an MRI/CT?  YES: CT SCAN 6/22/24 Include Date and Check Procedure Scheduling Grid to see if required.  Was the MRI/CT done within the last 3 years?  Yes   If no route to RN Pool/ Dr. Vieras Team  If yes, where was the MRI/CT done? [facility]    Refer to PACS Transmissions list for approved external locations and route to RN Pool High Priority/ Dr. Vieras Team  If MRI was not done at approved external location do NOT schedule and route to RN pool/ Dr. Vieras Team    If patient has an imaging disc, the injection MAY be scheduled but patient must bring disc to appt or appt will be cancelled.    Is patient able to transfer to a procedure table with minimal or no assistance? Yes   If no, do NOT schedule and route to RN Pool/ Dr. Vieras Team    Procedure Specific Instructions:  If celiac plexus block, informed patient NPO for 6 hours and that it is okay to take medications with sips of water, especially blood pressure medications Not Applicable       If this is for a cervical procedure, informed patient that aspirin needs to be held for 6 days.   Not Applicable    Sedation, If Sedation is ordered for any procedure, patient must be NPO for 6 hours prior to procedure Not Applicable    If IV needed:  Do not schedule procedures requiring IV placement in the first appointment of the day or first appointment after lunch. Do NOT schedule at 0745, 0815 or 1245. NO   Instructed patient to arrive 30 minutes early for IV start if required. (Check Procedure Scheduling Grid)  NO    Reminders:  If you are started on any steroids or antibiotics between now and your appointment, you must contact us because the procedure may need to be cancelled.  NO INFORMED   As a reminder, receiving steroids can decrease your body's ability to fight infection.   Would you still like to move forward  with scheduling the injection?  Yes    IV Sedation is not provided for procedures. If oral anti-anxiety medication is needed, the patient should request this from their referring provider.    Instruct patient to arrive as directed prior to the scheduled appointment time:  If IV needed 30 minutes before appointment time     For patients 85 or older we recommend having an adult stay w/ them for the remainder of the day.     If the patient is Diabetic, remind them to bring their glucometer.    Dr. Villeda Pt's - Imaging Orders Needed   Please send all injections to RN Pool Not Applicable   Red Flags? Not Applicable    Does the patient have any questions?  NO  Hortensia Olvera  [facility]

## 2025-01-21 NOTE — PATIENT INSTRUCTIONS
[facility]  Post Procedure Instructions    Today you saw:  Dr. Tran    Today you had a left subacromial bursa injection                             Medications used: Ropivacaine  Kenolog       Monitor the injection sites for signs and symptoms of infection-fever, chills, redness, swelling, warmth, or drainage to areas.  You may have soreness at injection sites for up to 24 hours.  It may take up to 14 days for the steroid medication to start working although you may feel the effect as early as a few days after the procedure.     You may apply ice to the painful areas to help minimize the discomfort of the needle pokes.  Do not apply heat to sites for at least 12 hours.  You may use anti-inflammatory medications or Tylenol for pain control if necessary    Pain Clinic phone number:  [phone]

## 2025-02-08 DIAGNOSIS — E78.00 PURE HYPERCHOLESTEROLEMIA: ICD-10-CM

## 2025-02-08 DIAGNOSIS — I25.10 CARDIOVASCULAR DISEASE: ICD-10-CM

## 2025-02-10 RX ORDER — SIMVASTATIN 20 MG
20 TABLET ORAL AT BEDTIME
Qty: 90 TABLET | Refills: 3 | Status: SHIPPED | OUTPATIENT
Start: 2025-02-10

## 2025-02-10 RX ORDER — CLOPIDOGREL BISULFATE 75 MG/1
75 TABLET ORAL DAILY
Qty: 90 TABLET | Refills: 0 | Status: SHIPPED | OUTPATIENT
Start: 2025-02-10

## 2025-02-16 SDOH — HEALTH STABILITY: PHYSICAL HEALTH: ON AVERAGE, HOW MANY DAYS PER WEEK DO YOU ENGAGE IN MODERATE TO STRENUOUS EXERCISE (LIKE A BRISK WALK)?: 0 DAYS

## 2025-02-16 SDOH — HEALTH STABILITY: PHYSICAL HEALTH: ON AVERAGE, HOW MANY MINUTES DO YOU ENGAGE IN EXERCISE AT THIS LEVEL?: 0 MIN

## 2025-02-16 ASSESSMENT — SOCIAL DETERMINANTS OF HEALTH (SDOH): HOW OFTEN DO YOU GET TOGETHER WITH FRIENDS OR RELATIVES?: TWICE A WEEK

## 2025-02-18 ENCOUNTER — OFFICE VISIT (OUTPATIENT)
Dept: FAMILY MEDICINE | Facility: CLINIC | Age: OVER 89
End: 2025-02-18
Payer: COMMERCIAL

## 2025-02-18 VITALS
HEART RATE: 93 BPM | TEMPERATURE: 97.8 F | SYSTOLIC BLOOD PRESSURE: 129 MMHG | WEIGHT: 141 LBS | DIASTOLIC BLOOD PRESSURE: 61 MMHG | BODY MASS INDEX: 22.76 KG/M2 | OXYGEN SATURATION: 95 % | RESPIRATION RATE: 20 BRPM

## 2025-02-18 DIAGNOSIS — I25.10 CORONARY ARTERY DISEASE INVOLVING NATIVE CORONARY ARTERY OF NATIVE HEART WITHOUT ANGINA PECTORIS: ICD-10-CM

## 2025-02-18 DIAGNOSIS — N40.0 PROSTATE ENLARGEMENT: Primary | ICD-10-CM

## 2025-02-18 DIAGNOSIS — E78.2 MIXED HYPERLIPIDEMIA: ICD-10-CM

## 2025-02-18 DIAGNOSIS — N32.89 BLADDER SPASMS: ICD-10-CM

## 2025-02-18 DIAGNOSIS — Z00.00 HEALTH CARE MAINTENANCE: ICD-10-CM

## 2025-02-18 DIAGNOSIS — R06.00 DYSPNEA, UNSPECIFIED TYPE: ICD-10-CM

## 2025-02-18 DIAGNOSIS — K59.00 CONSTIPATION, UNSPECIFIED CONSTIPATION TYPE: ICD-10-CM

## 2025-02-18 LAB
ERYTHROCYTE [DISTWIDTH] IN BLOOD BY AUTOMATED COUNT: 13.8 % (ref 10–15)
HCT VFR BLD AUTO: 37 % (ref 40–53)
HGB BLD-MCNC: 12.1 G/DL (ref 13.3–17.7)
MCH RBC QN AUTO: 33.2 PG (ref 26.5–33)
MCHC RBC AUTO-ENTMCNC: 32.7 G/DL (ref 31.5–36.5)
MCV RBC AUTO: 102 FL (ref 78–100)
PLATELET # BLD AUTO: 236 10E3/UL (ref 150–450)
RBC # BLD AUTO: 3.64 10E6/UL (ref 4.4–5.9)
WBC # BLD AUTO: 10.5 10E3/UL (ref 4–11)

## 2025-02-18 RX ORDER — TAMSULOSIN HYDROCHLORIDE 0.4 MG/1
CAPSULE ORAL
Qty: 180 CAPSULE | Refills: 3 | Status: SHIPPED | OUTPATIENT
Start: 2025-02-18

## 2025-02-18 RX ORDER — OXYBUTYNIN CHLORIDE 15 MG/1
15 TABLET, EXTENDED RELEASE ORAL DAILY
Qty: 90 TABLET | Refills: 3 | Status: SHIPPED | OUTPATIENT
Start: 2025-02-18

## 2025-02-18 NOTE — PROGRESS NOTES
Preventive Care Visit  [facility]  Miguel Day MD, Family Medicine  Feb 18, 2025      Assessment & Plan     Health care maintenance  Patient here for annual exam interested in blood work  No acute concerns per patient  - Lipid panel reflex to direct LDL Fasting  - oxyBUTYnin ER (DITROPAN XL) 15 MG 24 hr tablet  Dispense: 90 tablet; Refill: 3  - tamsulosin (FLOMAX) 0.4 MG capsule  Dispense: 180 capsule; Refill: 3  - Comprehensive metabolic panel (BMP + Alb, Alk Phos, ALT, AST, Total. Bili, TP)  - BNP-N terminal pro  - CBC with platelets  - Lipid panel reflex to direct LDL Fasting  - Comprehensive metabolic panel (BMP + Alb, Alk Phos, ALT, AST, Total. Bili, TP)  - BNP-N terminal pro  - CBC with platelets    Prostate enlargement  Patient feeling well with current combination oxybutynin tamsulosin although he is having some constipation which could be contributed by oxybutynin he would like to continue at this time with his current regimen  - oxyBUTYnin ER (DITROPAN XL) 15 MG 24 hr tablet  Dispense: 90 tablet; Refill: 3  - tamsulosin (FLOMAX) 0.4 MG capsule  Dispense: 180 capsule; Refill: 3    Coronary artery disease involving native coronary artery of native heart without angina pectoris  Patient follow with cardiology will check labs today  - Lipid panel reflex to direct LDL Fasting  - BNP-N terminal pro  - Lipid panel reflex to direct LDL Fasting  - BNP-N terminal pro    Dyspnea, unspecified type  Patient is interested in BNP recommended by cardiology will obtain this today  Minor rhonchi at the right lower lung field although patient is asymptomatic wife has been having upper respiratory symptoms  No signs of volume disturbance on exam  - BNP-N terminal pro  - BNP-N terminal pro    Mixed hyperlipidemia  Patient will check lipid levels    Bladder spasms  Continue with oxybutynin    Constipation, unspecified constipation type  Patient feels current medication is working fine he  can get by with periodic partial enemas      Patient has been advised of split billing requirements and indicates understanding: Yes        Counseling  Appropriate preventive services were addressed with this patient via screening, questionnaire, or discussion as appropriate for fall prevention, nutrition, physical activity, Tobacco-use cessation, social engagement, weight loss and cognition.  Checklist reviewing preventive services available has been given to the patient.  Reviewed patient's diet, addressing concerns and/or questions.   Updated plan of care.  Patient reported difficulty with activities of daily living were addressed today.The patient was provided with written information regarding signs of hearing loss.           Subjective   Ed is a 89 year old, presenting for the following:  Wellness Visit (Fasting labs )        2/18/2025    10:38 AM   Additional Questions   Roomed by Kenia SIGALA CMA   Accompanied by Spouse           HPI  Patient here for annual exam  Follow-up cardiology they would like to have a BNP level lipids checked per chart review  Patient has no acute concerns is walking with the aid of a walker and doing well  Discussed his tamsulosin and oxybutynin which he uses for BPH and bladder spasms he feels they are working well but he has had some constipation he alleviates this with partial enemas to help get rid of the stool he still has softer stool he states he is has trouble pushing it out.  He is not bothered by this regimen we discussed possibly going down on oxybutynin to see if symptoms can be improved which he declined.  We discussed if there is any worsening balance issues or trouble with constipation dry eyes dry mouth that we could consider going down on oxybutynin to 10 mg.  Is on statin therapy and we will check labs today.  Little bit of shortness of breath at times likely deconditioning with physical aging walking with the aid of a walker which is good but will check a BNP level for  cardiology today.  Continue his Plavix and cardiology as recommended ongoing treatment until risk outweighs benefit.  Very slight rhonchi right lower lung field patient has had a slight cough recently and wife's had upper respiratory infection otherwise has been feeling well-will monitor.      Health Care Directive  Patient does not have a Health Care Directive: Patient states has Advance Directive and will bring in a copy to clinic.      2/16/2025   General Health   How would you rate your overall physical health? (!) FAIR   Feel stress (tense, anxious, or unable to sleep) Not at all         2/16/2025   Nutrition   Diet: Regular (no restrictions)         2/16/2025   Exercise   Days per week of moderate/strenous exercise 0 days   Average minutes spent exercising at this level 0 min   (!) EXERCISE CONCERN      2/16/2025   Social Factors   Frequency of gathering with friends or relatives Twice a week   Worry food won't last until get money to buy more No   Food not last or not have enough money for food? No   Do you have housing? (Housing is defined as stable permanent housing and does not include staying ouside in a car, in a tent, in an abandoned building, in an overnight shelter, or couch-surfing.) Yes   Are you worried about losing your housing? No   Lack of transportation? No   Unable to get utilities (heat,electricity)? No         2/18/2025   Fall Risk   Reason Gait Speed Test Not Completed Unable to complete test, patient reported symptoms          2/16/2025   Activities of Daily Living- Home Safety   Needs help with the following daily activites Transportation    Shopping    Preparing meals    Laundry   Safety concerns in the home None of the above       Multiple values from one day are sorted in reverse-chronological order         2/16/2025   Dental   Dentist two times every year? Yes         2/16/2025   Hearing Screening   Hearing concerns? (!) I NEED TO ASK PEOPLE TO SPEAK UP OR REPEAT THEMSELVES.          2/16/2025   Driving Risk Screening   Patient/family members have concerns about driving (!) DECLINE         2/16/2025   General Alertness/Fatigue Screening   Have you been more tired than usual lately? No         2/16/2025   Urinary Incontinence Screening   Bothered by leaking urine in past 6 months No            Today's PHQ-2 Score:       2/18/2025    10:32 AM   PHQ-2 ( 1999 [employer])   Q1: Little interest or pleasure in doing things 0   Q2: Feeling down, depressed or hopeless 0   PHQ-2 Score 0    Q1: Little interest or pleasure in doing things Not at all   Q2: Feeling down, depressed or hopeless Not at all   PHQ-2 Score 0       Patient-reported           2/16/2025   Substance Use   Alcohol more than 3/day or more than 7/wk No   Do you have a current opioid prescription? No   How severe/bad is pain from 1 to 10? 8/10   Do you use any other substances recreationally? No     Social History     Tobacco Use    Smoking status: Never     Passive exposure: Past    Smokeless tobacco: Never   Vaping Use    Vaping status: Never Used   Substance Use Topics    Alcohol use: No    Drug use: No             Reviewed and updated as needed this visit by Provider                      Current providers sharing in care for this patient include:  Patient Care Team:  Miguel Day MD as PCP - General (Family Practice)  Carolann Mclean PA-C as Assigned PCP    The following health maintenance items are reviewed in Epic and correct as of today:  Health Maintenance   Topic Date Due    ANNUAL REVIEW OF HM ORDERS  Never done    DTAP/TDAP/TD IMMUNIZATION (1 - Tdap) 01/19/2022    LIPID  01/30/2025    MEDICARE ANNUAL WELLNESS VISIT  01/30/2025    COVID-19 Vaccine (8 - 2024-25 season) 03/18/2025    BMP  12/09/2025    FALL RISK ASSESSMENT  02/18/2026    ADVANCE CARE PLANNING  02/18/2030    PHQ-2 (once per calendar year)  Completed    INFLUENZA VACCINE  Completed    Pneumococcal Vaccine: 50+ Years  Completed    ZOSTER IMMUNIZATION   "Completed    RSV VACCINE  Completed    HPV IMMUNIZATION  Aged Out    MENINGITIS IMMUNIZATION  Aged Out            Objective    Exam  /61 (BP Location: Left arm, Patient Position: Sitting, Cuff Size: Adult Regular)   Pulse 93   Temp 97.8  F (36.6  C) (Oral)   Resp 20   Wt 64 kg (141 lb)   SpO2 95%   BMI 22.76 kg/m     Estimated body mass index is 22.76 kg/m  as calculated from the following:    Height as of 12/9/24: 1.676 m (5' 6\").    Weight as of this encounter: 64 kg (141 lb).    Physical Exam  GENERAL: alert and no distress  GENERAL: With aid of a walker  EYES: Eyes grossly normal to inspection, PERRL and conjunctivae and sclerae normal  RESP: lungs clear to auscultation - no rales, rhonchi or wheezes  CV: regular rate and rhythm, normal S1 S2, no S3 or S4, no murmur, click or rub, no peripheral edema  MS: no gross musculoskeletal defects noted, no edema  PSYCH: mentation appears normal, affect normal/bright         2/18/2025   Mini Cog   Clock Draw Score 2 Normal   3 Item Recall 1 object recalled   Mini Cog Total Score 3            The longitudinal plan of care for the diagnosis(es)/condition(s) as documented were addressed during this visit. Due to the added complexity in care, I will continue to support Ed in the subsequent management and with ongoing continuity of care.  Signed Electronically by: Miguel Day MD    "

## 2025-02-19 LAB
ALBUMIN SERPL BCG-MCNC: 3.8 G/DL (ref 3.5–5.2)
ALP SERPL-CCNC: 79 U/L (ref 40–150)
ALT SERPL W P-5'-P-CCNC: 19 U/L (ref 0–70)
ANION GAP SERPL CALCULATED.3IONS-SCNC: 12 MMOL/L (ref 7–15)
AST SERPL W P-5'-P-CCNC: 24 U/L (ref 0–45)
BILIRUB SERPL-MCNC: 0.4 MG/DL
BUN SERPL-MCNC: 19.5 MG/DL (ref 8–23)
CALCIUM SERPL-MCNC: 9.3 MG/DL (ref 8.8–10.4)
CHLORIDE SERPL-SCNC: 104 MMOL/L (ref 98–107)
CHOLEST SERPL-MCNC: 150 MG/DL
CREAT SERPL-MCNC: 0.92 MG/DL (ref 0.67–1.17)
EGFRCR SERPLBLD CKD-EPI 2021: 78 ML/MIN/1.73M2
FASTING STATUS PATIENT QL REPORTED: YES
FASTING STATUS PATIENT QL REPORTED: YES
GLUCOSE SERPL-MCNC: 98 MG/DL (ref 70–99)
HCO3 SERPL-SCNC: 25 MMOL/L (ref 22–29)
HDLC SERPL-MCNC: 57 MG/DL
LDLC SERPL CALC-MCNC: 77 MG/DL
NONHDLC SERPL-MCNC: 93 MG/DL
NT-PROBNP SERPL-MCNC: 436 PG/ML (ref 0–1800)
POTASSIUM SERPL-SCNC: 4.7 MMOL/L (ref 3.4–5.3)
PROT SERPL-MCNC: 6.7 G/DL (ref 6.4–8.3)
SODIUM SERPL-SCNC: 141 MMOL/L (ref 135–145)
TRIGL SERPL-MCNC: 81 MG/DL

## 2025-02-21 ENCOUNTER — TELEPHONE (OUTPATIENT)
Dept: FAMILY MEDICINE | Facility: CLINIC | Age: OVER 89
End: 2025-02-21
Payer: COMMERCIAL

## 2025-02-21 NOTE — TELEPHONE ENCOUNTER
----- Message from Miguel Day sent at 2/20/2025  5:46 PM CST -----  Please inform patient that hgb levels are stable. Cholesterol levels are at goal range.  Kidney and liver function are normal, no signs of diabetes.  Lab for cardiology showing no signs of heart failure.  No concerns on blood work.

## 2025-02-25 ENCOUNTER — TRANSFERRED RECORDS (OUTPATIENT)
Dept: HEALTH INFORMATION MANAGEMENT | Facility: CLINIC | Age: OVER 89
End: 2025-02-25
Payer: COMMERCIAL

## 2025-03-06 ENCOUNTER — RADIOLOGY INJECTION OFFICE VISIT (OUTPATIENT)
Dept: PALLIATIVE MEDICINE | Facility: OTHER | Age: OVER 89
End: 2025-03-06
Attending: STUDENT IN AN ORGANIZED HEALTH CARE EDUCATION/TRAINING PROGRAM
Payer: COMMERCIAL

## 2025-03-06 VITALS — HEART RATE: 101 BPM | SYSTOLIC BLOOD PRESSURE: 125 MMHG | DIASTOLIC BLOOD PRESSURE: 55 MMHG | OXYGEN SATURATION: 98 %

## 2025-03-06 DIAGNOSIS — M54.16 LUMBAR RADICULOPATHY: ICD-10-CM

## 2025-03-06 PROCEDURE — 250N000009 HC RX 250: Performed by: STUDENT IN AN ORGANIZED HEALTH CARE EDUCATION/TRAINING PROGRAM

## 2025-03-06 PROCEDURE — 64483 NJX AA&/STRD TFRM EPI L/S 1: CPT | Mod: LT | Performed by: STUDENT IN AN ORGANIZED HEALTH CARE EDUCATION/TRAINING PROGRAM

## 2025-03-06 PROCEDURE — 250N000011 HC RX IP 250 OP 636: Performed by: STUDENT IN AN ORGANIZED HEALTH CARE EDUCATION/TRAINING PROGRAM

## 2025-03-06 PROCEDURE — 255N000002 HC RX 255 OP 636: Performed by: STUDENT IN AN ORGANIZED HEALTH CARE EDUCATION/TRAINING PROGRAM

## 2025-03-06 PROCEDURE — 64484 NJX AA&/STRD TFRM EPI L/S EA: CPT | Mod: LT | Performed by: STUDENT IN AN ORGANIZED HEALTH CARE EDUCATION/TRAINING PROGRAM

## 2025-03-06 RX ORDER — LIDOCAINE HYDROCHLORIDE 10 MG/ML
3 INJECTION, SOLUTION EPIDURAL; INFILTRATION; INTRACAUDAL; PERINEURAL ONCE
Status: COMPLETED | OUTPATIENT
Start: 2025-03-06 | End: 2025-03-06

## 2025-03-06 RX ORDER — DEXAMETHASONE SODIUM PHOSPHATE 10 MG/ML
10 INJECTION, SOLUTION INTRA-ARTICULAR; INTRALESIONAL; INTRAMUSCULAR; INTRAVENOUS; SOFT TISSUE ONCE
Status: COMPLETED | OUTPATIENT
Start: 2025-03-06 | End: 2025-03-06

## 2025-03-06 RX ADMIN — LIDOCAINE HYDROCHLORIDE 3 ML: 10 INJECTION, SOLUTION EPIDURAL; INFILTRATION; INTRACAUDAL; PERINEURAL at 12:23

## 2025-03-06 RX ADMIN — IOHEXOL 5 ML: 180 INJECTION INTRAVENOUS at 11:00

## 2025-03-06 RX ADMIN — DEXAMETHASONE SODIUM PHOSPHATE 10 MG: 10 INJECTION, SOLUTION INTRAMUSCULAR; INTRAVENOUS at 12:23

## 2025-03-06 ASSESSMENT — PAIN SCALES - GENERAL
PAINLEVEL_OUTOF10: SEVERE PAIN (7)
PAINLEVEL_OUTOF10: MODERATE PAIN (4)

## 2025-03-06 NOTE — PROGRESS NOTES
Pre-procedure Intake    If YES to any questions or NO to having a     Notify nurses and provider    Are you taking any prescribed blood thinners such as Coumadin, Warfarin, Jantoven, Pradaxa Xarelto, Eliquis, Edoxaban, Enoxaparin, Lovenox, Heparin, Arixtra, Fondaparinux, or Fragmin? OR Antiplatelet medication such as Plavix, Brilinta, or Effient?   No   If yes, when did you take your last dose?     Do you take aspirin or use aspirin products?  No  If cervical procedure or interlaminar, have you held aspirin for 6 days?   NA    Do you have any allergies to contrast dye, iodine, steroid and/or numbing medications?  NO    Are you currently taking antibiotics or have an active infection?  NO    Have you had a fever/elevated temperature within the past week? NO    Have you had a vaccination of any kind in the last seven days or a Covid vaccine in the last two weeks NO    Do you have a ? Yes    Are you pregnant or breastfeeding?  Not Applicable      Notify provider and RNs if systolic BP >170, diastolic BP >100, P >100 or O2 sats < 90%

## 2025-03-06 NOTE — PATIENT INSTRUCTIONS
[facility]   Procedure Discharge Instructions    Today you saw:    Dr. Tran    You had a Left L5 and S1 Epidural Steroid Injection    Medications used:  Lidocaine Dexamethasone Omnipaque             If you were holding your blood thinning medication, please restart taking it: N/A  Be cautious when walking. Numbness and/or weakness in the lower extremities may occur for up to 6-8 hours after the procedure due to effect of the local anesthetic  Do not drive for 6 hours. The effect of the local anesthetic could slow your reflexes.   You may resume your regular activities after 24 hours  Avoid strenuous activity for the first 24 hours  You may shower, however avoid swimming, tub baths or hot tubs for 24 hours following your procedure  You may have a mild to moderate increase in pain for several days following the injection.  It may take up to 14 days for the steroid medication to start working although you may feel the effect as early as a few days after the procedure.     You may use ice packs for 10-15 minutes, 3 to 4 times a day at the injection site for comfort  Do not use heat to painful areas for 6 to 8 hours. This will give the local anesthetic time to wear off and prevent you from accidentally burning your skin.   Unless you have been directed to avoid the use of anti-inflammatory medications (NSAIDS), you may use medications such as ibuprofen, Aleve or Tylenol for pain control if needed.   If you were fasting, you may resume your normal diet and medications after the procedure  If you have diabetes, check your blood sugar more frequently than usual as your blood sugar may be higher than normal for 10-14 days following a steroid injection. Contact your doctor who manages your diabetes if your blood sugar is higher than usual  Possible side effects of steroids that you may experience include flushing, elevated blood pressure, increased appetite, mild headaches and  restlessness.  All of these symptoms will get better with time.  If you experience any of the following, call the Pain Clinic at [phone]:  -Fever over 100 degree F  -Swelling, bleeding, redness, drainage, warmth at the injection site  -Progressive weakness or numbness in your legs or arms  -Loss of bowel or bladder function  -Unusual headache that is not relieved by Tylenol or other pain reliever  -Unusual new onset of pain that is not improving

## 2025-03-06 NOTE — NURSING NOTE
Discharge Information    IV Discontiued Time:  NA    Amount of Fluid Infused:  NA    Discharge Criteria = When patient returns to baseline or as per MD order    Consciousness:  Pt is fully awake    Circulation:  BP +/- 20% of pre-procedure level    Respiration:  Patient is able to breathe deeply    O2 Sat:  Patient is able to maintain O2 Sat >92% on room air    Activity:  Moves 4 extremities on command    Ambulation:  Patient is able to stand and walk or stand and pivot into wheelchair    Dressing:  Clean/dry or No Dressing    Notes:   Discharge instructions and AVS given to patient    Patient meets criteria for discharge?  YES    Admitted to PCU?  No    Responsible adult present to accompany patient home?  Yes    Signature/Title:    ANNA GARCIA, RN  RN Care Coordinator  [facility]

## 2025-03-06 NOTE — PROGRESS NOTES
Pre procedure Diagnosis: lumbar radiculopathy   Post procedure Diagnosis: Same  Procedure performed: left L5 and S1 transforaminal epidural steroid injection, CPT code 38500, 46529  Anesthesia: none  Complications: none immediately  Operators: Abilio Tran MD    Indications:   Balwinder Huang is a 89 year old male was sent for a lumbar transforaminal epidural steroid injection      Options/alternatives, benefits and risks were discussed with the patient including bleeding, infection, tissue trauma, numbness, weakness, paralysis, spinal cord injury, radiation exposure, headache and reaction to medications. Questions were answered to his satisfaction and he agrees to proceed. Voluntary informed consent was obtained and signed.     Vitals were reviewed: Yes  Allergies were reviewed:  Yes   Medications were reviewed:  Yes   Pre-procedure pain score: 4/10    Procedure:  After getting informed consent, patient was brought into the procedure suite and was placed in a prone position on the procedure table.   A Pause for the Cause was performed.  Patient was prepped and draped in sterile fashion.     After identifying the left L5-S1 and S1 neuroforamina, the C-arm was rotated to a left lateral oblique angle.  A total of 5ml of Lidocaine 1% was used to anesthetize the skin and the needle track at a skin entry site coaxial with the fluoroscopy beam, and overriding the superior aspect of the neuroforamen.  Two 22 gauge 3.5 inch spinal needles were advanced under intermittent fluoroscopy until they entered the foramen superiorly.    The position was then inspected from anteroposterior and lateral views, and the needles adjusted appropriately.  A total of 3ml of Omnipaque-180 was injected, confirming appropriate position, with spread into the nerve root sheath and the epidural space, with no intravascular uptake. Visualization of active injection under live fluoroscopy or digital subtraction angiography or live fluoroscopy  further confirmed the above appropriate contrast spread.    A total of 3ml of preservative free 1% lidocaine with 10mg of dexamethasone was injected, split evenly among the above two sites.  The needles were flushed with lidocaine and removed.    During the procedure, the patient DID experience paresthesias corresponding to the nerve root near final needle placement.    Hemostasis was achieved, the area was cleaned, and bandaids were placed when appropriate.    The patient tolerated the procedure well, and was taken to the recovery room. Images were saved to PACS.    Post-procedure pain score: 7/10  Follow-up includes:   -f/u with referring provider    Abilio Tran MD  Interventional Pain Medicine  [facility]

## 2025-03-15 ENCOUNTER — HEALTH MAINTENANCE LETTER (OUTPATIENT)
Age: OVER 89
End: 2025-03-15

## 2025-03-20 ENCOUNTER — TRANSFERRED RECORDS (OUTPATIENT)
Dept: HEALTH INFORMATION MANAGEMENT | Facility: CLINIC | Age: OVER 89
End: 2025-03-20
Payer: COMMERCIAL

## 2025-03-24 ENCOUNTER — NURSE TRIAGE (OUTPATIENT)
Dept: FAMILY MEDICINE | Facility: CLINIC | Age: OVER 89
End: 2025-03-24

## 2025-03-24 NOTE — TELEPHONE ENCOUNTER
Nurse Triage SBAR    Is this a 2nd Level Triage? YES, LICENSED PRACTITIONER REVIEW IS REQUIRED    Situation: patient wife calling to report ongoing dizziness and weakness    Background: states patient has had a few falls the last few weeks  Last fall was a week and 2 days ago Saturday, states patient did hit his head  Patient refused going to ED at that time    Assessment: wife notes that since the last fall especially patient has had increasing weakness and dizziness  Patient states that when he gets up to move is when the dizziness worsens though is present usually at rest still  Patient wife feels like patient has just been more out of it the last week  Wife does note patient has always been bad about drinking fluids  Nurse advised would be best to be seen today   Discussed when to call back or go in  Patient verbalized understanding and agrees with plan of care.   Will call back with new or worsening symptoms      Protocol Recommended Disposition:   See in Office Today    Recommendation: please assist with scheduling as able, advise disposition if no availability     Routed to PCP team    Does the patient meet one of the following criteria for ADS visit consideration? 16+ years old, with an [facility] PCP   Isabella WELDON RN    TIP  Providers, please consider if this condition is appropriate for management at one of our Acute and Diagnostic Services sites.     If patient is a good candidate, please use dotphrase <dot>triageresponse and select Refer to ADS to document.  Reason for Disposition   MODERATE dizziness (e.g., interferes with normal activities)  (Exception: Dizziness caused by heat exposure, sudden standing, or poor fluid intake.)    Additional Information   Negative: SEVERE difficulty breathing (e.g., struggling for each breath, speaks in single words)   Negative: Shock suspected (e.g., cold/pale/clammy skin, too weak to stand, low BP, rapid pulse)   Negative: Difficult to awaken or acting confused (e.g.,  disoriented, slurred speech)   Negative: Fainted, and still feels dizzy afterwards   Negative: Overdose (accidental or intentional) of medications   Negative: New neurologic deficit that is present now: * Weakness of the face, arm, or leg on one side of the body * Numbness of the face, arm, or leg on one side of the body * Loss of speech or garbled speech   Negative: Heart beating < 50 beats per minute OR > 140 beats per minute   Negative: Sounds like a life-threatening emergency to the triager   Negative: SEVERE dizziness (e.g., unable to stand, requires support to walk, feels like passing out now)   Negative: SEVERE headache or neck pain   Negative: Spinning or tilting sensation (vertigo) present now and one or more stroke risk factors (i.e., hypertension, diabetes mellitus, prior stroke/TIA, heart attack, age over 60) (Exception: Prior physician evaluation for this AND no different/worse than usual.)   Negative: Neurologic deficit that was brief (now gone), ANY of the following:* Weakness of the face, arm, or leg on one side of the body* Numbness of the face, arm, or leg on one side of the body* Loss of speech or garbled speech   Negative: Loss of vision or double vision  (Exception: Similar to previous migraines.)   Negative: Extra heartbeats, irregular heart beating, or heart is beating very fast (i.e., 'palpitations')   Negative: Difficulty breathing   Negative: Drinking very little and dehydration suspected (e.g., no urine > 12 hours, very dry mouth, very lightheaded)   Negative: Follows bleeding (e.g., stomach, rectum, vagina)  (Exception: Became dizzy from sight of small amount blood.)   Negative: Patient sounds very sick or weak to the triager   Negative: Lightheadedness (dizziness) present now, after 2 hours of rest and fluids   Negative: Spinning or tilting sensation (vertigo) present now   Negative: Fever > 103 F (39.4 C)   Negative: Fever > 100.0 F (37.8 C) and has diabetes mellitus or a weak immune  system (e.g., HIV positive, cancer chemotherapy, organ transplant, splenectomy, chronic steroids)    Protocols used: Dizziness-A-OH

## 2025-03-24 NOTE — TELEPHONE ENCOUNTER
Provider Recommendation Follow Up:   Reached patient's wife, Carey (consent on file). Informed of provider's recommendations. Patient's spouse verbalized understanding and agrees with the plan, she will bring him to  for evaluation now.      Emily De Leon RN

## 2025-03-24 NOTE — TELEPHONE ENCOUNTER
Agree that he needs evaluation in urgent care or ED if no other appointments available.    Andreina Mendoza MD

## 2025-03-27 ENCOUNTER — MEDICAL CORRESPONDENCE (OUTPATIENT)
Dept: HEALTH INFORMATION MANAGEMENT | Facility: CLINIC | Age: OVER 89
End: 2025-03-27

## 2025-03-31 ENCOUNTER — TRANSFERRED RECORDS (OUTPATIENT)
Dept: HEALTH INFORMATION MANAGEMENT | Facility: CLINIC | Age: OVER 89
End: 2025-03-31
Payer: COMMERCIAL

## 2025-04-07 ENCOUNTER — MEDICAL CORRESPONDENCE (OUTPATIENT)
Dept: HEALTH INFORMATION MANAGEMENT | Facility: CLINIC | Age: OVER 89
End: 2025-04-07
Payer: COMMERCIAL

## 2025-04-08 ENCOUNTER — OFFICE VISIT (OUTPATIENT)
Dept: FAMILY MEDICINE | Facility: CLINIC | Age: OVER 89
End: 2025-04-08
Payer: COMMERCIAL

## 2025-04-08 VITALS
HEART RATE: 81 BPM | WEIGHT: 142.2 LBS | SYSTOLIC BLOOD PRESSURE: 119 MMHG | BODY MASS INDEX: 22.85 KG/M2 | DIASTOLIC BLOOD PRESSURE: 57 MMHG | RESPIRATION RATE: 20 BRPM | OXYGEN SATURATION: 94 % | TEMPERATURE: 98.2 F | HEIGHT: 66 IN

## 2025-04-08 DIAGNOSIS — S00.93XS TRAUMATIC HEMATOMA OF HEAD, SEQUELA: ICD-10-CM

## 2025-04-08 DIAGNOSIS — R30.0 DYSURIA: Primary | ICD-10-CM

## 2025-04-08 DIAGNOSIS — R33.9 URINARY RETENTION: ICD-10-CM

## 2025-04-08 LAB
ALBUMIN UR-MCNC: NEGATIVE MG/DL
APPEARANCE UR: CLEAR
BILIRUB UR QL STRIP: NEGATIVE
COLOR UR AUTO: YELLOW
GLUCOSE UR STRIP-MCNC: NEGATIVE MG/DL
HGB UR QL STRIP: NEGATIVE
KETONES UR STRIP-MCNC: NEGATIVE MG/DL
LEUKOCYTE ESTERASE UR QL STRIP: NEGATIVE
NITRATE UR QL: NEGATIVE
PH UR STRIP: 6 [PH] (ref 5–8)
SP GR UR STRIP: 1.02 (ref 1–1.03)
UROBILINOGEN UR STRIP-ACNC: 0.2 E.U./DL

## 2025-04-08 PROCEDURE — 3074F SYST BP LT 130 MM HG: CPT | Performed by: FAMILY MEDICINE

## 2025-04-08 PROCEDURE — G2211 COMPLEX E/M VISIT ADD ON: HCPCS | Performed by: FAMILY MEDICINE

## 2025-04-08 PROCEDURE — 99214 OFFICE O/P EST MOD 30 MIN: CPT | Performed by: FAMILY MEDICINE

## 2025-04-08 PROCEDURE — 81003 URINALYSIS AUTO W/O SCOPE: CPT | Performed by: FAMILY MEDICINE

## 2025-04-08 PROCEDURE — 3078F DIAST BP <80 MM HG: CPT | Performed by: FAMILY MEDICINE

## 2025-04-08 RX ORDER — MAGNESIUM OXIDE 400 MG/1
400 TABLET ORAL
COMMUNITY
Start: 2025-03-26

## 2025-04-08 NOTE — PROGRESS NOTES
Assessment & Plan     Urinary retention  Patient here for hospital follow-up from an outside facility  Having difficulty still with urinary retention  Patient started back on previous medications and has had a little bit of improvement but needs follow-up with urology  Discussed adding finasteride to his tamsulosin  Urine analysis showing no signs of infection or blood    Dysuria  Suspect urinary retention leading to dysuria there is no signs of infection last week or this week  Suspect urinary retention leading to symptoms some improvement since going back on tamsulosin and oxybutynin  Discussed risk of oxybutynin's balance  Will add in finasteride and have him follow-up with his urologist try the combination of tamsulosin and finasteride to try to help with urinary retention  - UA Macroscopic with reflex to Microscopic and Culture - Clinic Collect    Traumatic hematoma of head, sequela  Patient had a fall  Discussed the risks of balance issues with oxybutynin  He sustained a hematoma on the right frontal lobe which needs follow-up at a month later we will place referral and they will call to set up his appointment he is expressing no new focalizing neurological symptoms or ongoing balance problems since hospitalization  - CT Head w/o Contrast              Subjective   Ed is a 89 year old, presenting for the following health issues:  Hospital F/U (Hospital follow up 03/24-03/26- /No concerns/)      4/8/2025     9:52 AM   Additional Questions   Roomed by Taisha SIGALA CMA   Accompanied by Spouse     HPI      Here for hospital follow-up  Had a fall due to balance issue fell and sustained a head injury  No focalizing neurological symptoms patient was discharged for follow-up he has home PT OT they have been working on strengthening as he is had a lot of weakness since that hospitalization has had ongoing problem with increasing urinary retention which required a catheter due to discomfort catheter was removed by  "urology-patient had previously been on tamsulosin 2 capsules daily along with oxybutynin due to increased risk of falls oxybutynin was discontinued but patient started this back on his own shortly after being home due to increasing problems with urinary retention he is getting up more than 12 times at night and this is down to 3 or 4 times the last couple nights  He has not been taking finasteride but we discussed adding this to his tamsulosin to try to help facilitate further emptying of the bladder.  He is due for had repeat of the CT for his hematoma to make sure nothing is changing but clinical examination does not concerning for any focalizing neurological deficits.  Encouraged patient make a follow-up appoint with his urologist in case if combination tamsulosin and finasteride not improving symptoms.              Objective    /57 (BP Location: Left arm, Patient Position: Sitting, Cuff Size: Adult Small)   Pulse 81   Temp 98.2  F (36.8  C) (Oral)   Resp 20   Ht 1.676 m (5' 6\")   Wt 64.5 kg (142 lb 3.2 oz)   SpO2 94%   BMI 22.95 kg/m    Body mass index is 22.95 kg/m .  Physical Exam   GENERAL: alert and no distress, walking with the aid of a walker  EYES: Eyes grossly normal to inspection, PERRL and conjunctivae and sclerae normal  RESP: lungs clear to auscultation - no rales, rhonchi or wheezes  CV: regular rates and rhythm and no peripheral edema  MS: no gross musculoskeletal defects noted, no edema  NEURO: Normal strength and tone, mentation intact and speech normal  PSYCH: mentation appears normal, affect normal/bright          The longitudinal plan of care for the diagnosis(es)/condition(s) as documented were addressed during this visit. Due to the added complexity in care, I will continue to support Ed in the subsequent management and with ongoing continuity of care.  Signed Electronically by: Miguel Day MD    "

## 2025-04-10 ENCOUNTER — TELEPHONE (OUTPATIENT)
Dept: FAMILY MEDICINE | Facility: CLINIC | Age: OVER 89
End: 2025-04-10
Payer: COMMERCIAL

## 2025-04-10 NOTE — TELEPHONE ENCOUNTER
----- Message from Miguel Day sent at 4/4/2025 12:36 PM CDT -----  Please inform patient that urinalysis shows no signs of infection or blood.  I would continue to keep well-hydrated and monitor symptoms.  If worsening trouble with urination over the weekend I would be seen in person.

## 2025-04-14 ENCOUNTER — TRANSFERRED RECORDS (OUTPATIENT)
Dept: HEALTH INFORMATION MANAGEMENT | Facility: CLINIC | Age: OVER 89
End: 2025-04-14
Payer: COMMERCIAL

## 2025-04-25 DIAGNOSIS — Z53.9 DIAGNOSIS NOT YET DEFINED: Primary | ICD-10-CM

## 2025-04-25 PROCEDURE — G0180 MD CERTIFICATION HHA PATIENT: HCPCS | Performed by: FAMILY MEDICINE

## 2025-04-27 ASSESSMENT — PAIN SCALES - PAIN ENJOYMENT GENERAL ACTIVITY SCALE (PEG)
AVG_PAIN_PASTWEEK: 7
INTERFERED_GENERAL_ACTIVITY: 7
INTERFERED_ENJOYMENT_LIFE: 7
PEG_TOTALSCORE: 7

## 2025-04-29 ENCOUNTER — OFFICE VISIT (OUTPATIENT)
Dept: PALLIATIVE MEDICINE | Facility: OTHER | Age: OVER 89
End: 2025-04-29
Payer: COMMERCIAL

## 2025-04-29 ENCOUNTER — TELEPHONE (OUTPATIENT)
Dept: PALLIATIVE MEDICINE | Facility: OTHER | Age: OVER 89
End: 2025-04-29

## 2025-04-29 VITALS — OXYGEN SATURATION: 94 % | DIASTOLIC BLOOD PRESSURE: 61 MMHG | HEART RATE: 97 BPM | SYSTOLIC BLOOD PRESSURE: 124 MMHG

## 2025-04-29 DIAGNOSIS — M54.16 LUMBAR RADICULOPATHY: Primary | ICD-10-CM

## 2025-04-29 DIAGNOSIS — M54.12 CERVICAL RADICULOPATHY: ICD-10-CM

## 2025-04-29 DIAGNOSIS — M67.912 TENDINOPATHY OF LEFT ROTATOR CUFF: ICD-10-CM

## 2025-04-29 PROCEDURE — G0463 HOSPITAL OUTPT CLINIC VISIT: HCPCS | Performed by: STUDENT IN AN ORGANIZED HEALTH CARE EDUCATION/TRAINING PROGRAM

## 2025-04-29 PROCEDURE — 1125F AMNT PAIN NOTED PAIN PRSNT: CPT | Performed by: STUDENT IN AN ORGANIZED HEALTH CARE EDUCATION/TRAINING PROGRAM

## 2025-04-29 PROCEDURE — 99214 OFFICE O/P EST MOD 30 MIN: CPT | Performed by: STUDENT IN AN ORGANIZED HEALTH CARE EDUCATION/TRAINING PROGRAM

## 2025-04-29 PROCEDURE — 3074F SYST BP LT 130 MM HG: CPT | Performed by: STUDENT IN AN ORGANIZED HEALTH CARE EDUCATION/TRAINING PROGRAM

## 2025-04-29 PROCEDURE — 3078F DIAST BP <80 MM HG: CPT | Performed by: STUDENT IN AN ORGANIZED HEALTH CARE EDUCATION/TRAINING PROGRAM

## 2025-04-29 ASSESSMENT — PAIN SCALES - GENERAL: PAINLEVEL_OUTOF10: MILD PAIN (3)

## 2025-04-29 NOTE — PROGRESS NOTES
Patient presents to the clinic today for a visit with Abilio Tran MD regarding Pain Management.      UDS/CSA-     Medications-     Notes Shoulder is sore the first hour or two in the morning but gets to about a 3 and stays     Back is always at a 4. At times he will move wrong and it gets painful for a while. Stays at a seven then for a while and then calms down. He has pain going down the inside of his left leg.    Hortensia Watkins  [facility] Clinical Assistant

## 2025-04-29 NOTE — PROGRESS NOTES
[facility] Interventional Follow-up    Date of visit: 4/29/2025    Chief complaint:   Chief Complaint   Patient presents with    Pain       Interval history:  Since his last visit, Balwinder Huang reports:  Left subacromial bursa injection has been helping  Let L5 and S1 TFESI was helping 60% then after a fall, noticed recurrence of left posterior thigh pain.    Had a fall 3/15, unwitnessed, hit head. 3/24/25 went to ER due to lightheadedness and dizziness. Was admitted to [facility] and diagnosed with 1.5 x 0.8 right frontal lobe intraparenchymal hemorrhage     Noted whole body weakness after being in hospital.   Completed home health PT, doing HEP.      Pain scores:  Pain intensity currently is 3 on a scale of 0-10.     Current pain treatments:   Aleve - helpful     Other relevant meds:  Plavix     Previous medication treatments included:  Methocarbamol 500mg QID PRN - somewhat helpful for the shoulder  Tylenol - helpful  Advil - helping        Other treatments have included:  Balwinder Huang has not been seen at a pain clinic in the past.  For right hip  PT: undergoing - doing HEP. Helpful  Injections:    3/6/25 - left L5 and S1 TFESI - 60% improvement   1/21/25 - left subacromial bursa injection - helpful   12/4/24 - Left L5 and S1 TFESI - helpful              7/23/24 - left L5 and S1 SNRB with steroid at [facility] - helped              3/6/24 - left subacromial bursa injection - significantly helpful 75+% relief              7/6//23 - C-ILESI - 70% relief, ongoing              3/29/23 TPI left neck, thoracic paraspinals, rhomboids, levator scapula - helped for 1 day  Surgery: none  Alternative: chiropractor - has been going, helped a little    Side Effects: no side effect    Medications:  Current Outpatient Medications   Medication Sig Dispense Refill    acetaminophen (TYLENOL) 500 MG tablet Take 2 tablets (1,000 mg) by mouth 3 times daily 60 tablet 0     clopidogrel (PLAVIX) 75 MG tablet TAKE 1 TABLET(75 MG) BY MOUTH DAILY 90 tablet 0    finasteride (PROSCAR) 5 MG tablet Take 5 mg by mouth daily.      magnesium oxide (MAG-OX) 400 MG tablet Take 400 mg by mouth.      methocarbamol (ROBAXIN) 500 MG tablet Take 1 tablet (500 mg) by mouth 4 times daily as needed for muscle spasms. 120 tablet 3    multivitamin therapeutic (THERAGRAN) tablet [MULTIVITAMIN THERAPEUTIC (THERAGRAN) TABLET] Take 1 tablet by mouth daily.      nitroGLYcerin (NITROSTAT) 0.4 MG sublingual tablet nitroglycerin 0.4 mg sublingual tablet  PLACE ONE TABLET UNDER TONGUE AS NEEDED FOR CHEST PAIN EVERY 5 MINUTES AS DIRECTED 10 tablet 0    simvastatin (ZOCOR) 20 MG tablet Take 1 tablet (20 mg) by mouth at bedtime. 90 tablet 3    tamsulosin (FLOMAX) 0.4 MG capsule TAKE 2 CAPSULES(0.8 MG) BY MOUTH DAILY AFTER BREAKFAST 180 capsule 3       Medical History: any changes in medical history since they were last seen? No    Physical Exam:  Blood pressure 124/61, pulse 97, SpO2 94%.  Constitutional: Well developed, NAD  Head: normocephalic. Atraumatic.   Eyes: no redness or jaundice noted   CV: warm, well perfused extremities   Skin: no suspicious lesions or rashes   Psychiatric: mentation appears normal and affect full    Diagnostics:  none    Personally reviewed: N/A    Assessment:   Cervical spondylosis  Myofascial pain  Left rotator cuff tendinopathy  Lumbar radiculopathy     Balwinder Huang is a 89 year old male who presents for follow up of cervical radiculopathy, left rotator cuff tendinopathy, left lumbar radiculopathy.  Most recently, we have treated the rotator cuff tendinopathy with a left subacromial bursa injection in the right lower radiculopathy with a left L5 and S1 two-level TFESI.  The former is providing ongoing relief, and the latter was helping significantly 60% until the patient fell and caused a flare of his pain.  He was admitted to the hospital for 3 days for monitoring of an intracranial  hemorrhage.  We discussed the role of repeating the TFESI, no sooner than the 3-month suhail.  They are in agreement with this.  We can also repeat the left subacromial bursa injection, but due to ongoing benefit, we will hold off on scheduling this for now.  The cervical radiculopathy is fairly well-managed as well for now, so we will hold off on any interventional management of this.  Will see the patient back in 3 months.     Plan:   Diagnosis reviewed, treatment option addressed, and risk/benefits discussed.      Procedures:   Repeat Left L5 and S1 TFESI ordered for no sooner than 3 months from prior  Left subacromial bursa injection can be repeated no sooner than 3 months from prior  Can repeat C-ILESI no sooner than 3 months from prior  Left suprascapular nerve block and/or PNS can be considered for the future  Physical Therapy: continue HEP. Avoid compensating with left trapezium to move left shoulder.  Diagnostic Studies:   Last MRI Cervical spine 5/17/23  CT Lumbar spine reviewed  Medication Management:   Continue tylenol  continue PRN methocarbamol  No Flexeril, tizanidine, or baclofen due to known CNS depression/sedation effects, which could result in falls.  Use advil sparingly  Follow up: 3 months    Abilio Tran MD  Interventional Pain Medicine  [facility]

## 2025-04-29 NOTE — PATIENT INSTRUCTIONS
Procedures:   Repeat Left L5 and S1 TFESI ordered for no sooner than 3 months from prior  Left subacromial bursa injection can be repeated no sooner than 3 months from prior  Can repeat C-ILESI no sooner than 3 months from prior  Left suprascapular nerve block and/or PNS can be considered for the future  Physical Therapy: continue HEP. Avoid compensating with left trapezium to move left shoulder.  Diagnostic Studies:   Last MRI Cervical spine 5/17/23  CT Lumbar spine reviewed  Medication Management:   Continue tylenol  Restart PRN methocarbamol  No Flexeril, tizanidine, or baclofen due to known CNS depression/sedation effects, which could result in falls.  Use advil sparingly  Follow up: 3 months    Abilio Tran MD  Interventional Pain Medicine  [facility]

## 2025-04-29 NOTE — TELEPHONE ENCOUNTER
"Screening Questions for Radiology Injections:    Left L5 and S1 TFESI  Injection to be done at which interventional clinic site? [facility]    If choosing [facility] for location, please inform patient:  \"[facility] is a Hospital based clinic. Before your visit, you should check with your insurance about how it covers the charges for facility services in a hospital-based clinic.     Procedure ordered by DR FOY    Procedure ordered? Left L5 and S1 TFESI  Transforaminal Cervical GERARD - Send to [facility] - No [facility] Site providers perform this procedure    What insurance would patient like us to bill for this procedure? UCARE/UCARE MEDICARE   IF SCHEDULING IN [facility] OR [facility]INE PLEASE SCHEDULE AT LEAST 7-10 BUSINESS DAYS OUT SO A PA CAN BE OBTAINED  Worker's comp or MVA (motor vehicle accident) -Any injection DO NOT SCHEDULE and route to Rosalee De La Torre.    [employer] insurance - For ALL INJECTIONS DO NOT SCHEDULE and route to Eloisa Panchal.     ALL BCBS, [employer] and [employer] - DO NOT SCHEDULE and route to Eloisa Panchal  MEDICA- ALL INJECTIONS- route to Eloisa Abdulkadir    Is patient scheduled at [facility]? NO    If YES, route every encounter to [facility] SPINE CENTER CARE NAVIGATION POOL [[phone]]    Is an  needed? No     Patient has a  home? (Review Grid) YES: WIFE    Any chance of pregnancy? Not Applicable   If YES, do NOT schedule and route to RN pool  - Dr. Thornton route to PM&R Nurse  [[id number]]      Is patient actively being treated for cancer or immunocompromised? No  If YES, do NOT schedule and route to RN pool/ Dr. Thornton's Team    Does the patient have a bleeding or clotting disorder? No   If YES, okay to schedule AND route to RN nurse / Dr. Thornton's Team   (For any patients with platelet count <100, RN must forward to provider)    Is patient taking any Blood Thinners OR Antiplatelet medication?  Yes - plavix   IAntiplatelet Medications: (Plavix   Is " patient taking any aspirin products (includes Excedrin and Fiorinal)? No.    If yes route to RN pool/ Dr. Thornton's Team - Do not schedule    Is patient taking any GLP-1 Antagonist (hold needed for sedation patients only) NO  (semaglutide (Ozempic, Wegovy), dulaglutide (Trulicity), exenatide ER (Bydureon), tirzepatide (Mounjaro), Liraglutide (Saxenda, Victoza), semaglutide (Rybelsus), Terzepatide (Zepbound)  If YES, okay to schedule AND route to RN nurse / Dr. Thornton's Team      Any allergies to contrast dye, iodine, shellfish, or numbing and steroid medications? No  If YES, schedule and add allergy information to appointment notes AND route to the RN pool/ Dr. Thornton's Team  If GERARD and Contrast Dye / Iodine Allergy? DO NOT SCHEDULE, route to RN pool/ Dr. Thornton's Team  Allergies: Shellfish containing products [shellfish-derived products], Shrimp extract, and Ampicillin     Does patient have an active infection or treated for one within the past week? No  Is patient currently taking any antibiotics or steroid medications?  No   For patients on chronic, preventative, or prophylactic antibiotics, procedures may be scheduled.   For patients on antibiotics for active or recent infection, schedule 4 days after completed.  For patients on steroid medications, schedule 4 days after completed.     Has the patient had a flu shot or any other vaccinations within the past 7 days? No INFORMED   If yes, explain that for the vaccine to work best they need to:     wait 1 week before and 1 week after getting any Vaccine  wait 1 week before and 2 weeks after getting any Covid Vaccine   If patient has concerns about the timing, send to RN pool/ Dr. Thornton's Team    Does patient have an MRI/CT?  5/17 Include Date and Check Procedure Scheduling Grid to see if required.  Was the MRI/CT done within the last 3 years?  No YES   If no route to RN Pool/ Dr. Vieras Team  If yes, where was the MRI/CT done? [facility]    Refer to PACS  Transmissions list for approved external locations and route to RN Pool High Priority/ Dr. Thornton's Team  If MRI was not done at approved external location do NOT schedule and route to  pool/ Dr. Thornton's Team    If patient has an imaging disc, the injection MAY be scheduled but patient must bring disc to appt or appt will be cancelled.    Is patient able to transfer to a procedure table with minimal or no assistance? Yes   If no, do NOT schedule and route to  Pool/ Dr. Thornton's Team    Procedure Specific Instructions:  If celiac plexus block, informed patient NPO for 6 hours and that it is okay to take medications with sips of water, especially blood pressure medications Not Applicable       If this is for a cervical procedure, informed patient that aspirin needs to be held for 6 days.   Not Applicable    Sedation, If Sedation is ordered for any procedure, patient must be NPO for 6 hours prior to procedure Not Applicable    If IV needed:  Do not schedule procedures requiring IV placement in the first appointment of the day or first appointment after lunch. Do NOT schedule at 0745, 0815 or 1245. NO   Instructed patient to arrive 30 minutes early for IV start if required. (Check Procedure Scheduling Grid)  Not Applicable    Reminders:  If you are started on any steroids or antibiotics between now and your appointment, you must contact us because the procedure may need to be cancelled.  No INFORMED     As a reminder, receiving steroids can decrease your body's ability to fight infection.   Would you still like to move forward with scheduling the injection?  Yes    IV Sedation is not provided for procedures. If oral anti-anxiety medication is needed, the patient should request this from their referring provider.    Instruct patient to arrive as directed prior to the scheduled appointment time:  If IV needed 30 minutes before appointment time     For patients 85 or older we recommend having an adult stay w/ them for  the remainder of the day.     If the patient is Diabetic, remind them to bring their glucometer.    Dr. Villeda Pt's - Imaging Orders Needed   Please send all injections to RN Pool Not Applicable   Red Flags? Not Applicable    Does the patient have any questions?  ENMANUEL Olvera  [facility]

## 2025-05-06 ENCOUNTER — MEDICAL CORRESPONDENCE (OUTPATIENT)
Dept: HEALTH INFORMATION MANAGEMENT | Facility: CLINIC | Age: OVER 89
End: 2025-05-06
Payer: COMMERCIAL

## 2025-05-06 ENCOUNTER — HOSPITAL ENCOUNTER (OUTPATIENT)
Dept: CT IMAGING | Facility: HOSPITAL | Age: OVER 89
Discharge: HOME | End: 2025-05-06
Attending: FAMILY MEDICINE | Admitting: FAMILY MEDICINE
Payer: COMMERCIAL

## 2025-05-06 DIAGNOSIS — S00.93XS TRAUMATIC HEMATOMA OF HEAD, SEQUELA: ICD-10-CM

## 2025-05-06 PROCEDURE — 70450 CT HEAD/BRAIN W/O DYE: CPT

## 2025-05-13 ENCOUNTER — RESULTS FOLLOW-UP (OUTPATIENT)
Dept: FAMILY MEDICINE | Facility: CLINIC | Age: OVER 89
End: 2025-05-13
Payer: COMMERCIAL

## 2025-06-11 ENCOUNTER — TRANSFERRED RECORDS (OUTPATIENT)
Dept: HEALTH INFORMATION MANAGEMENT | Facility: CLINIC | Age: OVER 89
End: 2025-06-11
Payer: COMMERCIAL

## 2025-06-17 ENCOUNTER — TELEPHONE (OUTPATIENT)
Dept: PALLIATIVE MEDICINE | Facility: OTHER | Age: OVER 89
End: 2025-06-17
Payer: COMMERCIAL

## 2025-06-17 NOTE — TELEPHONE ENCOUNTER
Preprocedure reminder call Transforaminal Lumbar GERARD    Arrival time 11:15 am    Location: [facility] [address]    Do you have a ? no    If patient doesn't have a  they will need to call and reschedule    Has the patient had a flu shot or any other vaccinations within the past 7 days? no    If yes, explain that for the vaccine to work best they need to:              wait 1 week before and 1 week after getting any Vaccine           wait 1 week before and 2 weeks after getting any Covid Vaccine    If patient has concerns about the timing, send to RN pool    Have you had any oral steroids or antibiotics in the last five days?  (Oral steroid ok per Tran but note the patient is taking it or not)    If yes check with the nurse or provider. The procedure will most likely need to be rescheduled.    Its ok to eat and drink as usual and take your  BP and diabetes medications if this applies to you. no (note patient has been informed yes or no)      To call and reschedule or talk to the nurse about any questions you may have please dial    [phone]

## 2025-06-18 ENCOUNTER — RADIOLOGY INJECTION OFFICE VISIT (OUTPATIENT)
Dept: PALLIATIVE MEDICINE | Facility: OTHER | Age: OVER 89
End: 2025-06-18
Attending: STUDENT IN AN ORGANIZED HEALTH CARE EDUCATION/TRAINING PROGRAM
Payer: COMMERCIAL

## 2025-06-18 VITALS — HEART RATE: 97 BPM | OXYGEN SATURATION: 94 % | SYSTOLIC BLOOD PRESSURE: 121 MMHG | DIASTOLIC BLOOD PRESSURE: 59 MMHG

## 2025-06-18 DIAGNOSIS — M54.16 LUMBAR RADICULOPATHY: ICD-10-CM

## 2025-06-18 PROCEDURE — 250N000009 HC RX 250: Performed by: STUDENT IN AN ORGANIZED HEALTH CARE EDUCATION/TRAINING PROGRAM

## 2025-06-18 PROCEDURE — 64484 NJX AA&/STRD TFRM EPI L/S EA: CPT | Mod: LT | Performed by: STUDENT IN AN ORGANIZED HEALTH CARE EDUCATION/TRAINING PROGRAM

## 2025-06-18 PROCEDURE — 255N000002 HC RX 255 OP 636: Performed by: STUDENT IN AN ORGANIZED HEALTH CARE EDUCATION/TRAINING PROGRAM

## 2025-06-18 PROCEDURE — 250N000011 HC RX IP 250 OP 636: Performed by: STUDENT IN AN ORGANIZED HEALTH CARE EDUCATION/TRAINING PROGRAM

## 2025-06-18 RX ORDER — DEXAMETHASONE SODIUM PHOSPHATE 10 MG/ML
2.5 INJECTION, SOLUTION INTRA-ARTICULAR; INTRALESIONAL; INTRAMUSCULAR; INTRAVENOUS; SOFT TISSUE ONCE
Status: COMPLETED | OUTPATIENT
Start: 2025-06-18 | End: 2025-06-18

## 2025-06-18 RX ORDER — LIDOCAINE HYDROCHLORIDE 10 MG/ML
1 INJECTION, SOLUTION EPIDURAL; INFILTRATION; INTRACAUDAL; PERINEURAL ONCE
Status: COMPLETED | OUTPATIENT
Start: 2025-06-18 | End: 2025-06-18

## 2025-06-18 RX ADMIN — LIDOCAINE HYDROCHLORIDE 1 ML: 10 INJECTION, SOLUTION EPIDURAL; INFILTRATION; INTRACAUDAL; PERINEURAL at 13:00

## 2025-06-18 RX ADMIN — DEXAMETHASONE SODIUM PHOSPHATE 2.5 MG: 10 INJECTION INTRAMUSCULAR; INTRAVENOUS at 13:00

## 2025-06-18 RX ADMIN — IOHEXOL 8 ML: 180 INJECTION INTRAVENOUS at 12:23

## 2025-06-18 ASSESSMENT — PAIN SCALES - GENERAL
PAINLEVEL_OUTOF10: MILD PAIN (2)
PAINLEVEL_OUTOF10: NO PAIN (0)

## 2025-06-18 NOTE — NURSING NOTE
Discharge Information    IV Discontiued Time:  NA    Amount of Fluid Infused:  NA    Discharge Criteria = When patient returns to baseline or as per MD order    Consciousness:  Pt is fully awake    Circulation:  BP +/- 20% of pre-procedure level    Respiration:  Patient is able to breathe deeply    O2 Sat:  Patient is able to maintain O2 Sat >92% on room air    Activity:  Moves 4 extremities on command    Ambulation:  Patient is able to stand and walk or stand and pivot into wheelchair    Dressing:  Clean/dry or No Dressing    Notes:   Discharge instructions and AVS given to patient    Patient meets criteria for discharge?  YES    Admitted to PCU?  No    Responsible adult present to accompany patient home?  Yes    Signature/Title:    ANNA GARCIA, RN  RN Care Coordinator  [facility]

## 2025-06-18 NOTE — NURSING NOTE
Pre-procedure Intake  If YES to any questions or NO to having a   Please complete laminated checklist and leave on the computer keyboard for Provider, verbally inform provider if able.    For SCS Trial, RFA's or any sedation procedure:  Have you been fasting? NA  If yes, for how long?     Are you taking any any blood thinners such as Coumadin, Warfarin, Jantoven, Pradaxa Xarelto, Eliquis, Edoxaban, Enoxaparin, Lovenox, Heparin, Arixtra, Fondaparinux, or Fragmin? OR Antiplatelet medication such as Plavix, Brilinta, or Effient?   Yes -   Plavix   If yes, when did you take your last dose? NO hold needed. Patient reports last taking on 06/18/2025 in the AM    Do you take aspirin?  No  If cervical procedure, have you held aspirin for 6 days?   NA    Is the Pt taking any GLP-1 Antagonist (hold needed for sedation patients only)  (semaglutide (Ozempic, Wegovy), dulaglutide (Trulicity), exenatide ER (Bydureon), tirzepatide (Mounjaro), Liraglutide (Saxenda, Victoza), semaglutide (Rybelsus)     NA  If yes, when did you take your last dose?     Do you have any allergies to contrast dye, iodine, steroid and/or numbing medications?  NO    Are you currently taking antibiotics or have an active infection?  NO    Have you had a fever/elevated temperature within the past week? NO    Are you currently taking oral steroids? NO    Do you have a ? Yes    Are you pregnant or breastfeeding?  Not Applicable    Have you received any vaccinations in the last week? NO    Notify provider and RNs if systolic BP >170, diastolic BP >100, P >100 or O2 sats < 90%

## 2025-06-18 NOTE — PATIENT INSTRUCTIONS
[facility] Procedure Discharge Instructions    Today you saw:        Your procedure:  trans foraminal epidural steroid injection       Medications used:  Lidocaine (anesthetic)     Dexamethasone (steroid)   Omnipaque (contrast)             Be cautious when walking as numbness and/or weakness in the legs may occur up to 6-8 hours after the procedure due to effect of the local anesthetic  Do not drive for 6 hours. The effect of the local anesthetic could slow your reflexes.   Avoid strenuous activity for the first 24 hours. You may resume your regular activities after that.   You may shower, however avoid swimming, tub baths or hot tubs for 24 hours following your procedure  You may have a mild to moderate increase in pain for several days following the injection.    You may use ice packs for 10-15 minutes, 3 to 4 times a day at the injection site for comfort  Do not use heat to painful areas for 6 to 8 hours. This will give the local anesthetic time to wear off and prevent you from accidentally burning your skin.  Unless you have been directed to avoid the use of anti-inflammatory medications (NSAIDS-ibuprofen, Aleve, Motrin), you may use these medications or Tylenol for pain control if needed.   With diabetes, check your blood sugar more frequently than usual as your blood sugar may be higher than normal for 10-14 days following a steroid injection. Contact your doctor who manages your diabetes if your blood sugar is higher than usual  Possible side effects of steroids that you may experience include flushing, elevated blood pressure, increased appetite, mild headaches and restlessness.  All of these symptoms will get better with time.  It may take up to 14 days for the steroid medication to start working although you may feel the effect as early as a few days after the procedure.   Follow up with your referring provider in 2-3 weeks    If you experience any of the following, call the pain  center line during work hours at [phone] or on-call physician after hours at [phone]:  -Fever over 100 degree F  -Swelling, bleeding, redness, drainage, warmth at the injection site  -Progressive weakness or numbness in your legs or arms  -Loss of bowel or bladder function  -Unusual headache that is not relieved by Tylenol or your regular headache medication  -Unusual new onset of pain that is not improving

## 2025-06-18 NOTE — PROGRESS NOTES
Pre procedure Diagnosis: lumbar radiculopathy   Post procedure Diagnosis: Same  Procedure performed: Left L5 transforaminal epidural steroid injection, CPT code 37468 completed, aborted 20992 (Attempted Left S1 transforaminal epidural steroid injection, however unable to complete due to vascular uptake)   Anesthesia: none  Complications: none immediately  Operators: Abilio Tran MD; Bell Osorio DO     Indications:   Balwinder Huang is a 89 year old male was sent for a lumbar transforaminal epidural steroid injection       Options/alternatives, benefits and risks were discussed with the patient including bleeding, infection, tissue trauma, numbness, weakness, paralysis, spinal cord injury, radiation exposure, headache and reaction to medications. Questions were answered to his satisfaction and he agrees to proceed. Voluntary informed consent was obtained and signed.      Vitals were reviewed: Yes  Allergies were reviewed:  Yes   Medications were reviewed:  Yes   Pre-procedure pain score: 2/10     Procedure:  After getting informed consent, patient was brought into the procedure suite and was placed in a prone position on the procedure table.   A Pause for the Cause was performed.  Patient was prepped and draped in sterile fashion.      After identifying the left L5-S1 and S1 neuroforamina, the C-arm was rotated to a left lateral oblique angle.  A total of 5ml of Lidocaine 1% was used to anesthetize the skin and the needle track at a skin entry site coaxial with the fluoroscopy beam, and overriding the superior aspect of the neuroforamen. Two 22 gauge 3.5 inch spinal needles were advanced under intermittent fluoroscopy until they entered the foramen superiorly.     The position was then inspected from anteroposterior and lateral views, and the needles adjusted appropriately.  A total of 8ml of Omnipaque-180 was injected, confirming appropriate position, with spread into the nerve root sheath and the epidural  space, with no intravascular uptake at the left L5 level. Visualization of active injection under live fluoroscopy or digital subtraction angiography or live fluoroscopy further confirmed the above appropriate contrast spread at L5.    At the left S1 level, Omnipaque-180 revealed vascular uptake and continued to show vascular uptake or posterior position with needle readjustments. The procedure was aborted at the left S1 level due to vascular uptake.      A total of 3ml of preservative free 1% lidocaine with 10mg of dexamethasone was drawn up for the procedure, and 1mL was injected at the Left L5 level. The needle were flushed with lidocaine and removed.     During the procedure, the patient DID NOT experience paresthesias corresponding to the nerve root near final needle placement.     Hemostasis was achieved, the area was cleaned, and bandaids were placed when appropriate.     The patient tolerated the procedure well, and was taken to the recovery room. Images were saved to PACS.     Post-procedure pain score: 0/10    Follow-up includes:   -f/u with referring provider    Physician Attestation   RAFAELA, Abilio Tran MD, was present for all key and critical portions of the procedure, and I was immediately available during the remainder of the procedure.  Any changes to the documentation have been made above.    Abilio Tran MD  Interventional Pain Medicine  [facility]

## 2025-07-09 ENCOUNTER — MYC REFILL (OUTPATIENT)
Dept: FAMILY MEDICINE | Facility: CLINIC | Age: OVER 89
End: 2025-07-09
Payer: COMMERCIAL

## 2025-07-09 DIAGNOSIS — N40.1 BENIGN PROSTATIC HYPERPLASIA WITH URINARY FREQUENCY: Primary | ICD-10-CM

## 2025-07-09 DIAGNOSIS — R35.0 BENIGN PROSTATIC HYPERPLASIA WITH URINARY FREQUENCY: Primary | ICD-10-CM

## 2025-07-10 RX ORDER — FINASTERIDE 5 MG/1
5 TABLET, FILM COATED ORAL DAILY
Qty: 90 TABLET | Refills: 1 | Status: SHIPPED | OUTPATIENT
Start: 2025-07-10

## 2025-07-29 ENCOUNTER — VIRTUAL VISIT (OUTPATIENT)
Dept: PALLIATIVE MEDICINE | Facility: OTHER | Age: OVER 89
End: 2025-07-29
Attending: STUDENT IN AN ORGANIZED HEALTH CARE EDUCATION/TRAINING PROGRAM
Payer: COMMERCIAL

## 2025-07-29 DIAGNOSIS — M79.18 MYOFASCIAL PAIN: ICD-10-CM

## 2025-07-29 DIAGNOSIS — M54.16 LUMBAR RADICULOPATHY: Primary | ICD-10-CM

## 2025-07-29 DIAGNOSIS — M67.912 TENDINOPATHY OF LEFT ROTATOR CUFF: ICD-10-CM

## 2025-07-29 DIAGNOSIS — M54.12 CERVICAL RADICULOPATHY: ICD-10-CM

## 2025-07-29 PROCEDURE — 98006 SYNCH AUDIO-VIDEO EST MOD 30: CPT | Performed by: STUDENT IN AN ORGANIZED HEALTH CARE EDUCATION/TRAINING PROGRAM

## 2025-07-29 PROCEDURE — 1125F AMNT PAIN NOTED PAIN PRSNT: CPT | Mod: 95 | Performed by: STUDENT IN AN ORGANIZED HEALTH CARE EDUCATION/TRAINING PROGRAM

## 2025-07-29 ASSESSMENT — PAIN SCALES - GENERAL: PAINLEVEL_OUTOF10: SEVERE PAIN (7)

## 2025-07-29 NOTE — PATIENT INSTRUCTIONS
Procedures:   Repeat Left L5 TFESI ordered for no sooner than 3 months from prior  Left subacromial bursa injection can be repeated no sooner than 3 months from prior  Can repeat C-ILESI no sooner than 3 months from prior  Left suprascapular nerve block and/or PNS can be considered for the future  Physical Therapy: continue HEP. Avoid compensating with left trapezium to move left shoulder.  Diagnostic Studies:   Last MRI Cervical spine 5/17/23  CT Lumbar spine previously reviewed  Medication Management:   Continue tylenol  Continue PRN methocarbamol  No Flexeril, tizanidine, or baclofen due to known CNS depression/sedation effects, which could result in falls.  Use advil sparingly  Follow up: 3 months    Abilio Tran MD  Interventional Pain Medicine  [facility] Number:  [phone]  Call with any questions about your care and for scheduling assistance.   Calls are returned Monday through Friday between 8 AM and 4:30 PM. We usually get back to you within 2 business days depending on the issue/request.    If we are prescribing your medications:  For opioid medication refills, call the clinic or send a Linki message 7 days in advance.  Please include:  Name of requested medication  Name of the pharmacy.  For non-opioid medications, call your pharmacy directly to request a refill. Please allow 3-4 days to be processed.   Per [place] State Law:  All controlled substance prescriptions must be filled within 30 days of being written.    For those controlled substances allowing refills, pickup must occur within 30 days of last fill.      We believe regular attendance is key to your success in our program!    Any time you are unable to keep your appointment we ask that you call us at least 24 hours in advance to cancel.This will allow us to offer the appointment time to another patient.   Multiple missed appointments may lead  to dismissal from the clinic.

## 2025-07-29 NOTE — PROGRESS NOTES
[facility]      Balwinder Huang is a 89 year old male who is being evaluated via a billable virtual visit.      VIDEO VISIT   How would you like to obtain your AVS? MyChart  If you are dropped from the video visit, the video invite should be resent to: Text to cell phone: [phone]  Will anyone else be joining your video visit? NO,  Is patient CURRENTLY in [place]? YES  If patient encounters technical issues they should call [phone]    Video-Visit Details  Type of service:  Video Visit  Originating Location (pt. Location): Home  Distant Location (provider location):  [facility]   Platform used for Video Visit: Amanda GUEVARA/BRETT

## 2025-07-29 NOTE — PROGRESS NOTES
[facility] Interventional Follow-up    Date of visit: 7/29/2025    Chief complaint:   Chief Complaint   Patient presents with    Pain    Pain Management       Interval history:  Since his last visit, Balwinder Huang reports:  Left shoulder feeling okay. Feels  1-2 hours of pain in the morning, with improvement after HE.  Left low back and leg is very much improved from prior TFESI.    Pain scores:  Not assessed today    Current pain treatments:   Aleve - helpful     Other relevant meds:  Plavix     Previous medication treatments included:  Methocarbamol 500mg QID PRN - somewhat helpful for the shoulder  Tylenol - helpful  Advil - helping        Other treatments have included:  Balwinder Huang has not been seen at a pain clinic in the past.  For right hip  PT: undergoing - doing HEP. Helpful  Injections:    6/18/25 - Left L5 TFESI - 80%+ improvement, ongoing              3/6/25 - left L5 and S1 TFESI - 60% improvement              1/21/25 - left subacromial bursa injection - helpful              12/4/24 - Left L5 and S1 TFESI - helpful              7/23/24 - left L5 and S1 SNRB with steroid at [facility] - helped              3/6/24 - left subacromial bursa injection - significantly helpful 75+% relief              7/6//23 - C-ILESI - 70% relief, ongoing              3/29/23 TPI left neck, thoracic paraspinals, rhomboids, levator scapula - helped for 1 day  Surgery: none  Alternative: chiropractor - has been going, helped a little    Side Effects: no side effect    Medications:  Current Outpatient Medications   Medication Sig Dispense Refill    acetaminophen (TYLENOL) 500 MG tablet Take 2 tablets (1,000 mg) by mouth 3 times daily 60 tablet 0    clopidogrel (PLAVIX) 75 MG tablet TAKE 1 TABLET(75 MG) BY MOUTH DAILY 90 tablet 0    finasteride (PROSCAR) 5 MG tablet Take 1 tablet (5 mg) by mouth daily. 90 tablet 1    magnesium oxide (MAG-OX) 400 MG tablet Take 400 mg  by mouth.      methocarbamol (ROBAXIN) 500 MG tablet Take 1 tablet (500 mg) by mouth 4 times daily as needed for muscle spasms. 120 tablet 3    multivitamin therapeutic (THERAGRAN) tablet [MULTIVITAMIN THERAPEUTIC (THERAGRAN) TABLET] Take 1 tablet by mouth daily.      nitroGLYcerin (NITROSTAT) 0.4 MG sublingual tablet nitroglycerin 0.4 mg sublingual tablet  PLACE ONE TABLET UNDER TONGUE AS NEEDED FOR CHEST PAIN EVERY 5 MINUTES AS DIRECTED 10 tablet 0    simvastatin (ZOCOR) 20 MG tablet Take 1 tablet (20 mg) by mouth at bedtime. 90 tablet 3    tamsulosin (FLOMAX) 0.4 MG capsule TAKE 2 CAPSULES(0.8 MG) BY MOUTH DAILY AFTER BREAKFAST 180 capsule 3       Medical History: any changes in medical history since they were last seen? No    Physical Exam:  There were no vitals taken for this visit. Virtual visit limits exam  Constitutional: Well developed, NAD  Head: normocephalic. Atraumatic.   Eyes: no redness or jaundice noted   Skin: no suspicious lesions or rashes   Psychiatric: mentation appears normal and affect full    Diagnostics:  none    Personally reviewed: N/A    Assessment:   Cervical spondylosis  Myofascial pain  Left rotator cuff tendinopathy  Lumbar radiculopathy     Balwinder Huang is a 89 year old male who presents for follow up of cervical radiculopathy, left rotator cuff tendinopathy, left lumbar radiculopathy.  We have previously treated the rotator cuff tendinopathy with a left subacromial bursa injections abd the right lumbar radiculopathy with a left L5 TFESI.  Both have helped significantly and can be repeated as needed past the 3 month suhail. The cervical radiculopathy is fairly well-managed as well for now, so we will hold off on any interventional management of this.  Patient will continue with as-neede methocarbamol. Will see the patient back in 3 months.     Plan:   Diagnosis reviewed, treatment option addressed, and risk/benefits discussed.      Procedures:   Repeat Left L5 TFESI ordered for no  sooner than 3 months from prior  Left subacromial bursa injection can be repeated no sooner than 3 months from prior  Can repeat C-ILESI no sooner than 3 months from prior  Left suprascapular nerve block and/or PNS can be considered for the future  Physical Therapy: continue HEP. Avoid compensating with left trapezium to move left shoulder.  Diagnostic Studies:   Last MRI Cervical spine 5/17/23  CT Lumbar spine previously reviewed  Medication Management:   Continue tylenol  Continue PRN methocarbamol  No Flexeril, tizanidine, or baclofen due to known CNS depression/sedation effects, which could result in falls.  Use advil sparingly  Follow up: 3 months    Abilio Tran MD  Interventional Pain Medicine  [facility]    Type of service:  Video Visit    Video Start Time: 1:35PM    Video End Time:1:39PM    Originating Location (pt. Location): Home    Distant Location (provider location):  [facility] [place]     Platform used for Video Visit: [employer]

## 2025-08-19 ENCOUNTER — TRANSFERRED RECORDS (OUTPATIENT)
Dept: HEALTH INFORMATION MANAGEMENT | Facility: CLINIC | Age: OVER 89
End: 2025-08-19
Payer: COMMERCIAL